# Patient Record
Sex: FEMALE | Race: WHITE | NOT HISPANIC OR LATINO | Employment: OTHER | URBAN - METROPOLITAN AREA
[De-identification: names, ages, dates, MRNs, and addresses within clinical notes are randomized per-mention and may not be internally consistent; named-entity substitution may affect disease eponyms.]

---

## 2017-02-20 ENCOUNTER — ALLSCRIPTS OFFICE VISIT (OUTPATIENT)
Dept: OTHER | Facility: OTHER | Age: 72
End: 2017-02-20

## 2017-03-31 ENCOUNTER — ALLSCRIPTS OFFICE VISIT (OUTPATIENT)
Dept: OTHER | Facility: OTHER | Age: 72
End: 2017-03-31

## 2017-03-31 ENCOUNTER — TRANSCRIBE ORDERS (OUTPATIENT)
Dept: ADMINISTRATIVE | Facility: HOSPITAL | Age: 72
End: 2017-03-31

## 2017-03-31 DIAGNOSIS — R59.0 BILATERAL HILAR ADENOPATHY SYNDROME: Primary | ICD-10-CM

## 2017-04-27 ENCOUNTER — ALLSCRIPTS OFFICE VISIT (OUTPATIENT)
Dept: OTHER | Facility: OTHER | Age: 72
End: 2017-04-27

## 2017-05-16 LAB
A/G RATIO (HISTORICAL): 1.3 (CALC) (ref 1–2.5)
ALBUMIN SERPL BCP-MCNC: 3.9 G/DL (ref 3.6–5.1)
ALP SERPL-CCNC: 85 U/L (ref 33–130)
ALT SERPL W P-5'-P-CCNC: 22 U/L (ref 6–29)
AST SERPL W P-5'-P-CCNC: 22 U/L (ref 10–35)
BILIRUB SERPL-MCNC: 0.5 MG/DL (ref 0.2–1.2)
BUN SERPL-MCNC: 17 MG/DL (ref 7–25)
BUN/CREA RATIO (HISTORICAL): NORMAL (CALC) (ref 6–22)
CALCIUM SERPL-MCNC: 9.2 MG/DL (ref 8.6–10.4)
CHLORIDE SERPL-SCNC: 101 MMOL/L (ref 98–110)
CHOLEST SERPL-MCNC: 177 MG/DL (ref 125–200)
CHOLEST/HDLC SERPL: 4.5 (CALC)
CO2 SERPL-SCNC: 29 MMOL/L (ref 20–31)
CREAT SERPL-MCNC: 0.87 MG/DL (ref 0.6–0.93)
EGFR AFRICAN AMERICAN (HISTORICAL): 78 ML/MIN/1.73M2
EGFR-AMERICAN CALC (HISTORICAL): 67 ML/MIN/1.73M2
GAMMA GLOBULIN (HISTORICAL): 2.9 G/DL (CALC) (ref 1.9–3.7)
GLUCOSE (HISTORICAL): 93 MG/DL (ref 65–99)
HDLC SERPL-MCNC: 39 MG/DL
LDL CHOLESTEROL (HISTORICAL): 109 MG/DL (CALC)
NON-HDL-CHOL (CHOL-HDL) (HISTORICAL): 138 MG/DL (CALC)
POTASSIUM SERPL-SCNC: 3.6 MMOL/L (ref 3.5–5.3)
SODIUM SERPL-SCNC: 140 MMOL/L (ref 135–146)
TOTAL PROTEIN (HISTORICAL): 6.8 G/DL (ref 6.1–8.1)
TRIGL SERPL-MCNC: 143 MG/DL

## 2017-06-01 DIAGNOSIS — E78.5 HYPERLIPIDEMIA: ICD-10-CM

## 2017-06-01 DIAGNOSIS — I10 ESSENTIAL (PRIMARY) HYPERTENSION: ICD-10-CM

## 2017-06-14 ENCOUNTER — ALLSCRIPTS OFFICE VISIT (OUTPATIENT)
Dept: OTHER | Facility: OTHER | Age: 72
End: 2017-06-14

## 2017-10-24 ENCOUNTER — LAB CONVERSION - ENCOUNTER (OUTPATIENT)
Dept: OTHER | Facility: OTHER | Age: 72
End: 2017-10-24

## 2017-10-24 LAB
A/G RATIO (HISTORICAL): 1.6 (CALC) (ref 1–2.5)
ALBUMIN SERPL BCP-MCNC: 4 G/DL (ref 3.6–5.1)
ALP SERPL-CCNC: 87 U/L (ref 33–130)
ALT SERPL W P-5'-P-CCNC: 27 U/L (ref 6–29)
AST SERPL W P-5'-P-CCNC: 31 U/L (ref 10–35)
BILIRUB SERPL-MCNC: 0.4 MG/DL (ref 0.2–1.2)
BUN SERPL-MCNC: 13 MG/DL (ref 7–25)
BUN/CREA RATIO (HISTORICAL): ABNORMAL (CALC) (ref 6–22)
CALCIUM SERPL-MCNC: 9.2 MG/DL (ref 8.6–10.4)
CHLORIDE SERPL-SCNC: 102 MMOL/L (ref 98–110)
CHOLEST SERPL-MCNC: 169 MG/DL
CHOLEST/HDLC SERPL: 4 (CALC)
CO2 SERPL-SCNC: 32 MMOL/L (ref 20–31)
CREAT SERPL-MCNC: 0.89 MG/DL (ref 0.6–0.93)
EGFR AFRICAN AMERICAN (HISTORICAL): 76 ML/MIN/1.73M2
EGFR-AMERICAN CALC (HISTORICAL): 65 ML/MIN/1.73M2
GAMMA GLOBULIN (HISTORICAL): 2.5 G/DL (CALC) (ref 1.9–3.7)
GLUCOSE (HISTORICAL): 87 MG/DL (ref 65–99)
HDLC SERPL-MCNC: 42 MG/DL
LDL CHOLESTEROL (HISTORICAL): 108 MG/DL (CALC)
NON-HDL-CHOL (CHOL-HDL) (HISTORICAL): 127 MG/DL (CALC)
POTASSIUM SERPL-SCNC: 3.9 MMOL/L (ref 3.5–5.3)
SODIUM SERPL-SCNC: 140 MMOL/L (ref 135–146)
TOTAL PROTEIN (HISTORICAL): 6.5 G/DL (ref 6.1–8.1)
TRIGL SERPL-MCNC: 98 MG/DL

## 2017-11-01 DIAGNOSIS — Z00.00 ENCOUNTER FOR GENERAL ADULT MEDICAL EXAMINATION WITHOUT ABNORMAL FINDINGS: ICD-10-CM

## 2017-11-01 DIAGNOSIS — I10 ESSENTIAL (PRIMARY) HYPERTENSION: ICD-10-CM

## 2017-11-01 DIAGNOSIS — E78.5 HYPERLIPIDEMIA: ICD-10-CM

## 2017-11-01 DIAGNOSIS — R01.1 CARDIAC MURMUR: ICD-10-CM

## 2017-11-15 ENCOUNTER — ALLSCRIPTS OFFICE VISIT (OUTPATIENT)
Dept: OTHER | Facility: OTHER | Age: 72
End: 2017-11-15

## 2017-11-17 NOTE — PROGRESS NOTES
Assessment    1  Benign essential hypertension (401 1) (I10)   2  Hyperlipidemia (272 4) (E78 5)   3  Wheezing (786 07) (R06 2)    Plan  Benign essential hypertension    · Triamterene-HCTZ 37 5-25 MG Oral Tablet; Take 1 tablet daily  Flu vaccine need    · Fluzone High-Dose 0 5 ML Intramuscular Suspension Prefilled Syringe  Hyperlipidemia    · Lovastatin 20 MG Oral Tablet; TAKE 1 TABLET DAILY   · Begin or continue regular aerobic exercise  Gradually work up to at least {count1}sessions of {dur1} of exercise a week ; Status:Complete;   Done: 31DRM8548   · Eat a low fat and low cholesterol diet ; Status:Complete;   Done: 02BPQ7396   · Follow-up visit in 5 days Evaluation and Treatment  Follow-up  Status: Hold For -Scheduling  Requested for: 79IHT5762   · (1) COMPREHENSIVE METABOLIC PANEL; Status:Active; Requested for:02Apr2018;    · (1) LIPID PANEL, FASTING; Status:Active; Requested for:02Apr2018; Wheezing    · ProAir  (90 Base) MCG/ACT Inhalation Aerosol Solution; INHALE TWOPUFFS BY MOUTH EVERY FOUR HOURS AS NEEDED    Discussion/Summary    Hypertension is at goal patient to continue triamterene  Lipids at goal patient to continue low-fat diet and lovastatin  Patient had 1 expiratory wheeze ProAir inhaler refilled  Flu vaccine given today  Labs ordered for next visit  The treatment plan was reviewed with the patient/guardian  The patient/guardian understands and agrees with the treatment plan      Chief Complaint  blood work follow up   Patient is here today for follow up of chronic conditions described in HPI  History of Present Illness  Patient presents for follow up chronic conditions patient has hypertension controlled with triamterene-HCTZ  Patient is on lovastatin for hyperlipidemia  Patient takes calcium over-the-counter for her osteopenia  Flu vaccine given today  Labs reviewed with patient CMP is normal and lipid profile is at goal  Patient has been using ProAir HFA little bit more this fall  Patient has some chest tightness and shortness of breath going up and down the stairs since the weather change  Patient has no history of asthma  Patient denies chest pain  Review of Systems   Constitutional: no recent weight gain-- and-- no recent weight loss  Eyes: no eyesight problems  Cardiovascular: no chest pain-- and-- no lower extremity edema  Respiratory: wheezing-- and-- shortness of breath during exertion, but-- no cough  Gastrointestinal: no abdominal pain,-- no constipation-- and-- no diarrhea  Genitourinary: no dysuria  Musculoskeletal: no arthralgias-- and-- no myalgias  Integumentary: no rashes  Neurological: no headache-- and-- no dizziness  Active Problems  1  Benign essential hypertension (401 1) (I10)   2  Cardiac murmur (785 2) (R01 1)   3  Glaucoma Screening   4  Hyperlipidemia (272 4) (E78 5)   5  Medicare annual wellness visit, subsequent (V70 0) (Z00 00)   6  Osteopenia (733 90) (M85 80)    Past Medical History  1  History of Benign Polyps Of The Large Intestine (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 72)   2  History of Depression screen (V79 0) (Z13 89)   3  Denied: History of depression   4  Denied: History of substance abuse   5  History of Medicare annual wellness visit, subsequent (V70 0) (Z00 00)   6  History of Menopause (V49 81)   7  History of Screening for genitourinary condition (V81 6) (Z13 89)   8  History of Screening for neurological condition (V80 09) (Z13 89)    The active problems and past medical history were reviewed and updated today  Surgical History  1  History of Arthroscopy Knee Left   2  History of Excision Of Lesion Lips Benign   3  History of Incisional Breast Biopsy   4  Denied: History of Knee Replacement   5  History of Varicose Vein Ligation    Family History  Mother    1  Denied: Family history of depression   2  Family history of hypertension (V17 49) (Z82 49)   3  Family history of pancreatic cancer (V16 0) (Z80 0)   4   Denied: Family history of substance abuse  Father    5  Denied: Family history of depression   6  Denied: Family history of substance abuse   7  Family history of Motor vehicle accident with significant injury  Sister    6  Family history of malignant neoplasm of breast (V16 3) (Z80 3)  Brother    5  Family history of lung cancer (V16 1) (Z80 1)    Social History     · Being A Social Drinker   · Former smoker (E78 62) (X19 140)  The social history was reviewed and updated today  Current Meds   1  Calcium TABS; Therapy: (Recorded:10Aug2015) to Recorded   2  Co Q-10 CAPS; Therapy: (Recorded:10Aug2015) to Recorded   3  Fish Oil CAPS; Therapy: (Recorded:10Aug2015) to Recorded   4  Lovastatin 20 MG Oral Tablet; TAKE 1 TABLET DAILY; Therapy: 47FTK6711 to (Renew:11Dec2017)  Requested for: 13ENU1758; Last Rx:14Jun2017 Ordered   5  Multi For Her TABS; Therapy: (Recorded:10Aug2015) to Recorded   6  ProAir  (90 Base) MCG/ACT Inhalation Aerosol Solution; INHALE TWO PUFFS BY MOUTH EVERY FOUR HOURS AS NEEDED; Therapy: 96XBV6817 to (Last Rx:30Mar2016)  Requested for: 92YCC4867 Ordered   7  Triamcinolone Acetonide 0 1 % Mouth/Throat Paste; Therapy: 07Shj4576 to Recorded   8  Triamterene-HCTZ 37 5-25 MG Oral Tablet; Take 1 tablet daily; Therapy: 06SKV2274 to (Renew:11Dec2017)  Requested for: 79SXT8916; Last Rx:14Jun2017 Ordered    The medication list was reviewed and updated today  Allergies  1  No Known Drug Allergies    Vitals  Vital Signs    Recorded: 56THD4621 06:33PM   Temperature 97 6 F, Tympanic   Heart Rate 69, L Brachial Artery   Pulse Quality Normal, L Brachial Artery   Respiration Quality Normal   Respiration 16   Systolic 478, LUE, Sitting   Diastolic 76, LUE, Sitting   Height 5 ft 3 in   Weight 154 lb    BMI Calculated 27 28   BSA Calculated 1 73   O2 Saturation 97       Physical Exam   Constitutional  General appearance: No acute distress, well appearing and well nourished     Head and Face  Head and face: Normal    Eyes  Conjunctiva and lids: No swelling, erythema or discharge  Pupils and irises: Equal, round, reactive to light  Ears, Nose, Mouth, and Throat  External inspection of ears and nose: Normal    Oropharynx: Normal with no erythema, edema, exudate or lesions  Neck  Neck: Supple, symmetric, trachea midline, no masses  Thyroid: Normal, no thyromegaly  Pulmonary  Auscultation of lungs: Abnormal  -- pt had one expiratoery wheeze  Cardiovascular  Auscultation of heart: Normal rate and rhythm, normal S1 and S2, no murmurs  Carotid pulses: 2+ bilaterally  Examination of extremities for edema and/or varicosities: Normal    Abdomen  Abdomen: Non-tender, no masses  Liver and spleen: No hepatomegaly or splenomegaly  Lymphatic  Palpation of lymph nodes in neck: No lymphadenopathy  Psychiatric  Judgment and insight: Normal    Mood and affect: Normal        Health Management  History of Encounter for screening mammogram for breast cancer   * MAMMO SCREENING BILATERAL W CAD; every 1 year; Last 27ZRZ4126; Next Due: 58BNS9459; Overdue  Digital Bilateral Screening Mammogram With CAD; every 1 year; Last 87VKP3931; Next Due:84Pbr2575; Overdue  Health Maintenance   Medicare Annual Wellness Visit; every 1 year; Last 97NUP5150; Next Due: 51MSA6863;  Overdue    Future Appointments    Date/Time Provider Specialty Site   04/16/2018 10:30 AM Vesta Kirk [facility]       Signatures   Electronically signed by : Loyda Eason [facility]; Nov 15 2017  7:12PM EST                       (Author)    Electronically signed by : OLEG Moore ; Nov 16 2017  9:06AM EST                       (Author)

## 2018-01-10 NOTE — PROGRESS NOTES
Assessment    1  Encounter for preventive health examination (V70 0) (Z00 00)    Discussion/Summary  health maintenance visit Currently, she eats a healthy diet and has an adequate exercise regimen  cervical cancer screening is not indicated Breast cancer screening: mammogram is current  Colorectal cancer screening: colorectal cancer screening is current  Osteoporosis screening: bone mineral density testing is current  The immunizations are up to date  Advice and education were given regarding aerobic exercise, calcium supplements and vitamin D supplements  81 yo healthy white female  History of Present Illness  HM, Adult Female: The patient is being seen for a health maintenance evaluation  General Health: The patient's health since the last visit is described as good  She has regular dental visits  She denies vision problems  She denies hearing loss  Immunizations status: up to date  Lifestyle:  She exercises regularly  She does not use tobacco  She denies alcohol use  Reproductive health: the patient is postmenopausal    Screening: Cervical cancer screening includes uncertain timing of her last pap smear  Breast cancer screening includes a mammogram performed last year  Colorectal cancer screening includes a colonoscopy performed within the past ten years  Metabolic screening includes lipid profile performed 21016, glucose screening performed 2016, thyroid function test performed last year and DEXA performed within the past five years  Review of Systems    Constitutional: no fever and no chills  Eyes: no eye pain  ENT: no earache  Cardiovascular: no chest pain and no lower extremity edema  Respiratory: shortness of breath and sob with exertion, but no wheezing and no cough  Gastrointestinal: no abdominal pain, no nausea, no vomiting, no diarrhea, no constipation, no bright red blood per rectum and no melena  Genitourinary: no dysuria     Musculoskeletal: no diffuse joint pain, no joint swelling and no joint stiffness  Integumentary and Breasts: no rashes  Neurological: no headache, no confusion, no dizziness, no fainting, no leg numbness, no leg weakness and no difficulty walking  Psychiatric: no insomnia and no depression  Hematologic and Lymphatic: no tendency for easy bleeding and no tendency for easy bruising  Active Problems    1  Benign essential hypertension (401 1) (I10)   2  Cardiac murmur (785 2) (R01 1)   3  Depression screen (V79 0) (Z13 89)   4  Glaucoma Screening   5  Hyperlipidemia (272 4) (E78 5)   6  Medicare annual wellness visit, subsequent (V70 0) (Z00 00)   7  Osteoporosis (733 00) (M81 0)   8  Screening for genitourinary condition (V81 6) (Z13 89)   9  Screening for neurological condition (V80 09) (Z13 89)    Past Medical History    · History of Benign Polyps Of The Large Intestine (V12 72)   · History of Menopause (V49 81)    Surgical History    · History of Arthroscopy Knee Left   · History of Excision Of Lesion Lips Benign   · History of Incisional Breast Biopsy   · Denied: History of Knee Replacement   · History of Varicose Vein Ligation    Family History  Mother    · Family history of hypertension (V17 49) (Z82 49)   · Family history of pancreatic cancer (V16 0) (Z80 0)  Father    · Family history of Motor vehicle accident with significant injury  Sister    · Family history of malignant neoplasm of breast (V16 3) (Z80 3)  Brother    · Family history of lung cancer (V16 1) (Z80 1)    Social History    · Being A Social Drinker   · Former smoker (N95 49) (C65 817)    Current Meds   1  Calcium TABS; Therapy: (Recorded:10Aug2015) to Recorded   2  Co Q-10 CAPS; Therapy: (Recorded:10Aug2015) to Recorded   3  Fish Oil CAPS; Therapy: (Recorded:10Aug2015) to Recorded   4  Lovastatin 20 MG Oral Tablet; TAKE 1 TABLET DAILY; Therapy: 68DFL2453 to (Evaluate:14Nov2016)  Requested for: 19VCR6374; Last   Rx:09Bmm8671 Ordered   5   Multi For Her TABS; Therapy: (Recorded:10Aug2015) to Recorded   6  ProAir  (90 Base) MCG/ACT Inhalation Aerosol Solution; INHALE TWO PUFFS   BY MOUTH EVERY FOUR HOURS AS NEEDED; Therapy: 16OOA9375 to (Last Rx:30Mar2016)  Requested for: 57HUJ7195 Ordered   7  Triamcinolone Acetonide 0 1 % Mouth/Throat Paste; Therapy: 03Ojb1795 to Recorded   8  Triamterene-HCTZ 37 5-25 MG Oral Tablet; Take 1 tablet daily; Therapy: 45UHM7888 to (Evaluate:14Nov2016)  Requested for: 35RLW6425; Last   Rx:48Dsl5824 Ordered    Allergies    1  No Known Drug Allergies    Vitals   Recorded: 29DQN7639 03:32PM   Temperature 97 4 F, Tympanic   Heart Rate 72, L Brachial Artery   Pulse Quality Normal, L Brachial Artery   Respiration 16   Respiration Quality Normal   Systolic 452, LUE, Sitting   Diastolic 82, LUE, Sitting   Height 5 ft 3 in   Weight 149 lb 12 8 oz   BMI Calculated 26 54   BSA Calculated 1 71     Physical Exam    Constitutional   General appearance: No acute distress, well appearing and well nourished  Head and Face   Head and face: Normal     Eyes   Conjunctiva and lids: No swelling, erythema or discharge  Pupils and irises: Equal, round, reactive to light  Ears, Nose, Mouth, and Throat   External inspection of ears and nose: Normal     Otoscopic examination: Tympanic membranes translucent with normal light reflex  Canals patent without erythema  Lips, teeth, and gums: Normal, good dentition  Oropharynx: Normal with no erythema, edema, exudate or lesions  Neck   Neck: Supple, symmetric, trachea midline, no masses  Thyroid: Normal, no thyromegaly  Pulmonary   Respiratory effort: No increased work of breathing or signs of respiratory distress  Auscultation of lungs: Clear to auscultation  Cardiovascular   Auscultation of heart: Normal rate and rhythm, normal S1 and S2, no murmurs  Carotid pulses: 2+ bilaterally      Examination of extremities for edema and/or varicosities: Normal     Abdomen   Abdomen: Non-tender, no masses  Liver and spleen: No hepatomegaly or splenomegaly  Lymphatic   Palpation of lymph nodes in neck: No lymphadenopathy  Musculoskeletal   Gait and station: Normal     Digits and nails: Normal without clubbing or cyanosis  Examination of the extremities revealed no fingernail clubbing and well perfused fingers  Muscle strength/tone: Normal     Skin   Skin and subcutaneous tissue: Normal without rashes or lesions  Neurologic   Cranial nerves: Cranial nerves II-XII intact  Cranial Nerve II: visual acuity and visual fields were intact  Cranial Nerves III, IV, and VI: the extraocular motions were intact  Cranial Nerve V: sensation to the face and masseter strength were intact  Cranial Nerve VII: facial strength was intact bilaterally  Cranial Nerves IX and X: there was normal movement of the soft palate and normal gag  Cranial Nerve XI: shoulder shrug was intact bilaterally  Cranial Nerve XII: there was no tongue deviation with protrusion  Reflexes: 2+ and symmetric  Deep tendon reflexes: 2+ right brachioradialis, 2+ left brachioradialis, 2+ right patella and 2+ left patella  Psychiatric   Judgment and insight: Normal     Orientation to person, place, and time: Normal     Mood and affect: Normal        Results/Data  [employer] 42SXZ6467 03:33PM User, [facility]     Test Name Result Flag Reference   SBIRT Screen - Tobacco Screening Result Negative         Health Management  History of Encounter for screening mammogram for breast cancer   Digital Bilateral Screening Mammogram With CAD; every 1 year; Last 02XHW7695; Next  Due: 15Ohg4653; Near Due  Health Maintenance   Medicare Annual Wellness Visit; every 1 year; Next Due: 95VQG9983;  Overdue    Future Appointments    Date/Time Provider Specialty Site   08/18/2016 03:30 PM Emil Grier [facility]     Signatures   Electronically signed by : Emil Cleary; Jun 7 2016  4:02PM EST                       (Author)    Electronically signed by : Anthony Barrett DO; Jun 8 2016  7:57AM EST                       (Author)

## 2018-01-11 NOTE — RESULT NOTES
Verified Results  * DXA BONE DENSITY SPINE HIP AND PELVIS 25Oct2016 01:57PM [place]     Test Name Result Flag Reference   DXA BONE DENSITY SPINE HIP AND PELVIS 10/25/2016        Summary / No summary entered :      No summary entered   Documents attached :      Lorin Abrams Douse: [id number] - Image Encounter -      [facility]) (Result Document)

## 2018-01-12 VITALS
BODY MASS INDEX: 26.65 KG/M2 | HEART RATE: 71 BPM | SYSTOLIC BLOOD PRESSURE: 126 MMHG | WEIGHT: 150.4 LBS | OXYGEN SATURATION: 97 % | DIASTOLIC BLOOD PRESSURE: 82 MMHG | HEIGHT: 63 IN | RESPIRATION RATE: 16 BRPM | TEMPERATURE: 97.7 F

## 2018-01-12 VITALS
HEIGHT: 63 IN | SYSTOLIC BLOOD PRESSURE: 140 MMHG | RESPIRATION RATE: 16 BRPM | WEIGHT: 154 LBS | DIASTOLIC BLOOD PRESSURE: 82 MMHG | TEMPERATURE: 98.8 F | OXYGEN SATURATION: 97 % | HEART RATE: 77 BPM | BODY MASS INDEX: 27.29 KG/M2

## 2018-01-13 VITALS
OXYGEN SATURATION: 97 % | DIASTOLIC BLOOD PRESSURE: 76 MMHG | TEMPERATURE: 97.6 F | HEART RATE: 69 BPM | RESPIRATION RATE: 16 BRPM | HEIGHT: 63 IN | BODY MASS INDEX: 27.29 KG/M2 | WEIGHT: 154 LBS | SYSTOLIC BLOOD PRESSURE: 128 MMHG

## 2018-01-13 VITALS
TEMPERATURE: 98.2 F | RESPIRATION RATE: 16 BRPM | HEIGHT: 63 IN | WEIGHT: 151.13 LBS | SYSTOLIC BLOOD PRESSURE: 160 MMHG | BODY MASS INDEX: 26.78 KG/M2 | DIASTOLIC BLOOD PRESSURE: 80 MMHG | OXYGEN SATURATION: 97 % | HEART RATE: 78 BPM

## 2018-01-14 VITALS
HEIGHT: 63 IN | TEMPERATURE: 97.8 F | DIASTOLIC BLOOD PRESSURE: 86 MMHG | OXYGEN SATURATION: 97 % | SYSTOLIC BLOOD PRESSURE: 124 MMHG | WEIGHT: 151.8 LBS | BODY MASS INDEX: 26.9 KG/M2 | RESPIRATION RATE: 16 BRPM | HEART RATE: 64 BPM

## 2018-01-16 NOTE — RESULT NOTES
Verified Results  ECHO COMPLETE WITH CONTRAST IF INDICATED, TTE / TRANSTHORACIC 66UQB6233 07:44AM Claribel Staggers     Test Name Result Flag Reference   ECHO COMPLETE WITH CONTRAST IF INDICATED (Report)     [address]   [phone]     Transthoracic Echocardiogram   2D, M-mode, Doppler, and Color Doppler     Study date: 03-Mar-2016     Patient: Patricia Workman   MR number: [id number]   Account number: [de-identified]   : 1945   Age: 80 years   Gender: Female   Status: Outpatient   Location: [place] [address]    Height: 63 in   Weight: 155 lb   BP: 130/ 74 mmHg     Indications: SOB  Murmur  Diagnoses: R01 1 - Cardiac murmur, unspecified     Sonographer: Geller RCS   Primary Physician: Neelam Solomon   Referring Physician: Gemini Mccormick: Claudine Benson [facility]   Interpreting Physician: Marcello Guzman MD     SUMMARY     LEFT VENTRICLE:   Systolic function was normal  Ejection fraction was estimated to be 65 %  There were no regional wall motion abnormalities  HISTORY: PRIOR HISTORY: Former smoker  Risk factors: medication-treated   hypercholesterolemia  PROCEDURE: The study was performed in the [place]  This was a   routine study  The transthoracic approach was used  The study included complete   2D imaging, M-mode, complete spectral Doppler, and color Doppler  The heart   rate was 72 bpm, at the start of the study  Images were obtained from the   parasternal, apical, subcostal, and suprasternal notch acoustic windows  Image   quality was adequate  LEFT VENTRICLE: Size was normal  Systolic function was normal  Ejection   fraction was estimated to be 65 %  There were no regional wall motion   abnormalities  Wall thickness was normal  DOPPLER: The ratio of early   ventricular filling to atrial contraction velocities was within the normal   range       RIGHT VENTRICLE: The size was normal  Systolic function was normal      LEFT ATRIUM: Size was normal      RIGHT ATRIUM: Size was normal      MITRAL VALVE: Valve structure was normal  There was normal leaflet separation  DOPPLER: The transmitral velocity was within the normal range  There was no   evidence for stenosis  There was mild regurgitation  AORTIC VALVE: The valve was trileaflet  Leaflets exhibited mildly increased   thickness and normal cuspal separation  DOPPLER: There was no evidence for   stenosis  There was no regurgitation  TRICUSPID VALVE: The valve structure was normal  There was normal leaflet   separation  DOPPLER: The transtricuspid velocity was within the normal range  There was no evidence for stenosis  There was trace regurgitation  Estimated   peak PA pressure was 30 mmHg  PULMONIC VALVE: Leaflets exhibited normal thickness, no calcification, and   normal cuspal separation  DOPPLER: The transpulmonic velocity was within the   normal range  There was no regurgitation  PERICARDIUM: There was no pericardial effusion  A pericardial fat pad was   present  The pericardium was normal in appearance  AORTA: The root exhibited normal size  SYSTEMIC VEINS: IVC: The inferior vena cava was normal in size and course     Respirophasic changes were normal      SYSTEM MEASUREMENT TABLES     2D   %FS: 48 7 %   AV Diam: 3 13 cm   EDV(Teich): 70 71 ml   EF(Cube): 86 5 %   EF(Teich): 80 58 %   ESV(Cube): 8 75 ml   ESV(Teich): 13 73 ml   IVSd: 0 93 cm   LA Area: 10 11 cm2   LA Diam: 3 3 cm   LVEDV MOD A4C: 62 52 ml   LVEF MOD A4C: 64 61 %   LVESV MOD A4C: 22 13 ml   LVIDd: 4 02 cm   LVIDs: 2 06 cm   LVLd A4C: 6 59 cm   LVLs A4C: 5 57 cm   LVPWd: 0 93 cm   RA Area: 9 79 cm2   RV Diam: 2 58 cm   SV MOD A4C: 40 4 ml   SV(Cube): 56 07 ml   SV(Teich): 56 98 ml     CW   TR MaxP 95 mmHg   TR Vmax: 2 69 m/s     MM   TAPSE: 2 13 cm     PW   E': 0 06 m/s   MV A Ronan: 0 67 m/s   MV Dec C[place]: 3 68 m/s2   MV DecT: 199 63 ms   MV E Ronan: 0 73 m/s   MV E/A Ratio: 1 1   MV PHT: 70 01 ms   MVA By PHT: 3 14 cm2     [employer] Accredited Echocardiography Laboratory     Prepared and electronically signed by     Jorge Gregg MD   Signed 03-Mar-2016 13:34:40

## 2018-02-28 ENCOUNTER — OFFICE VISIT (OUTPATIENT)
Dept: FAMILY MEDICINE CLINIC | Facility: CLINIC | Age: 73
End: 2018-02-28
Payer: COMMERCIAL

## 2018-02-28 VITALS
WEIGHT: 156.6 LBS | DIASTOLIC BLOOD PRESSURE: 84 MMHG | HEIGHT: 63 IN | TEMPERATURE: 97.5 F | HEART RATE: 72 BPM | OXYGEN SATURATION: 96 % | SYSTOLIC BLOOD PRESSURE: 126 MMHG | BODY MASS INDEX: 27.75 KG/M2

## 2018-02-28 DIAGNOSIS — R06.2 WHEEZING: ICD-10-CM

## 2018-02-28 DIAGNOSIS — J20.9 BRONCHITIS, ACUTE, WITH BRONCHOSPASM: Primary | ICD-10-CM

## 2018-02-28 PROCEDURE — 99214 OFFICE O/P EST MOD 30 MIN: CPT | Performed by: PHYSICIAN ASSISTANT

## 2018-02-28 RX ORDER — ALBUTEROL SULFATE 90 UG/1
2 AEROSOL, METERED RESPIRATORY (INHALATION) EVERY 4 HOURS PRN
COMMUNITY
Start: 2016-03-30 | End: 2018-10-10 | Stop reason: SDUPTHER

## 2018-02-28 RX ORDER — METHYLPREDNISOLONE 4 MG/1
TABLET ORAL
Qty: 21 TABLET | Refills: 0 | Status: SHIPPED | OUTPATIENT
Start: 2018-02-28 | End: 2018-04-16 | Stop reason: ALTCHOICE

## 2018-02-28 RX ORDER — LOVASTATIN 20 MG/1
1 TABLET ORAL DAILY
COMMUNITY
Start: 2012-03-09 | End: 2018-05-25 | Stop reason: SDUPTHER

## 2018-02-28 RX ORDER — DIMENHYDRINATE 50 MG
TABLET ORAL
COMMUNITY

## 2018-02-28 RX ORDER — UBIDECARENONE 30 MG
CAPSULE ORAL
COMMUNITY

## 2018-02-28 RX ORDER — TRIAMTERENE AND HYDROCHLOROTHIAZIDE 37.5; 25 MG/1; MG/1
1 TABLET ORAL DAILY
COMMUNITY
Start: 2012-03-13 | End: 2018-05-25 | Stop reason: SDUPTHER

## 2018-02-28 RX ORDER — TRIAMCINOLONE ACETONIDE 0.1 %
PASTE (GRAM) DENTAL
COMMUNITY
Start: 2014-08-25

## 2018-02-28 RX ORDER — AZITHROMYCIN 250 MG/1
TABLET, FILM COATED ORAL
Qty: 6 TABLET | Refills: 0 | Status: SHIPPED | OUTPATIENT
Start: 2018-02-28 | End: 2018-03-04

## 2018-02-28 NOTE — PROGRESS NOTES
Assessment/Plan:         There are no diagnoses linked to this encounter  Subjective:      Patient ID: Clint Sánchez is a 68 y o  female  Patient presents with a dry spasmodic cough for over 2 weeks  Patient states she had a cold last week  Then she had pinkeye over the weekend  Patient does watch her grandchildren  Patient found use her inhaler up ProAir several times with not much relief  Patient states when her she coughs her chest does hurt she has no earache throat hurts from coughing no fever body aches or chills  The following portions of the patient's history were reviewed and updated as appropriate:   She   Patient Active Problem List    Diagnosis Date Noted    Osteopenia 11/18/2016    Cardiac murmur 02/17/2016    Benign essential hypertension 06/11/2012    Hyperlipidemia 06/11/2012     Current Outpatient Prescriptions   Medication Sig Dispense Refill    albuterol (PROAIR HFA) 90 mcg/act inhaler Inhale 2 puffs every 4 (four) hours as needed      Calcium Carb-Cholecalciferol (CALCIUM 1000 + D PO) Take by mouth      coenzyme Q-10 100 MG capsule Take by mouth      lovastatin (MEVACOR) 20 mg tablet Take 1 tablet by mouth daily      Multiple Vitamins-Minerals (MULTI FOR HER) TABS Take by mouth      Omega-3 Fatty Acids (FISH OIL) 645 MG CAPS Take by mouth      triamcinolone (KENALOG) 0 1 % oral topical paste Apply to the mouth or throat      triamterene-hydrochlorothiazide (MAXZIDE-25) 37 5-25 mg per tablet Take 1 tablet by mouth daily       No current facility-administered medications for this visit  No current outpatient prescriptions on file prior to visit  No current facility-administered medications on file prior to visit  She has No Known Allergies       Review of Systems   Constitutional: Negative for chills, fatigue and fever  HENT: Negative for ear pain, sinus pain and sinus pressure  Respiratory: Positive for cough and shortness of breath   Negative for wheezing  Cardiovascular: Negative for chest pain  Gastrointestinal: Negative for abdominal pain, diarrhea and vomiting  Genitourinary: Negative for dysuria  Musculoskeletal: Negative for arthralgias and myalgias  Skin: Negative for rash  Objective:      /84 (BP Location: Left arm, Patient Position: Sitting, Cuff Size: Standard)   Pulse 72   Temp 97 5 °F (36 4 °C)   Ht 5' 3" (1 6 m)   Wt 71 kg (156 lb 9 6 oz)   SpO2 96%   BMI 27 74 kg/m²          Physical Exam   Constitutional: She appears well-developed and well-nourished  HENT:   Left Ear: External ear normal    Mouth/Throat: Oropharynx is clear and moist    Eyes: Conjunctivae are normal  Pupils are equal, round, and reactive to light  Cardiovascular: Normal rate, regular rhythm and normal heart sounds  No murmur heard  Pulmonary/Chest: Effort normal  No respiratory distress  She has wheezes (pt  has diffuse  expiratory  wheezes  )  Musculoskeletal: She exhibits no edema  Lymphadenopathy:     She has no cervical adenopathy

## 2018-03-26 LAB
ALBUMIN SERPL-MCNC: 4.2 G/DL (ref 3.6–5.1)
ALBUMIN/GLOB SERPL: 1.6 (CALC) (ref 1–2.5)
ALP SERPL-CCNC: 78 U/L (ref 33–130)
ALT SERPL-CCNC: 17 U/L (ref 6–29)
AST SERPL-CCNC: 19 U/L (ref 10–35)
BILIRUB SERPL-MCNC: 0.4 MG/DL (ref 0.2–1.2)
BUN SERPL-MCNC: 22 MG/DL (ref 7–25)
BUN/CREAT SERPL: 22 (CALC) (ref 6–22)
CALCIUM SERPL-MCNC: 9.8 MG/DL (ref 8.6–10.4)
CHLORIDE SERPL-SCNC: 103 MMOL/L (ref 98–110)
CHOLEST SERPL-MCNC: 189 MG/DL
CHOLEST/HDLC SERPL: 4.2 (CALC)
CO2 SERPL-SCNC: 32 MMOL/L (ref 20–31)
CREAT SERPL-MCNC: 1.01 MG/DL (ref 0.6–0.93)
GLOBULIN SER CALC-MCNC: 2.7 G/DL (CALC) (ref 1.9–3.7)
GLUCOSE SERPL-MCNC: 96 MG/DL (ref 65–99)
HDLC SERPL-MCNC: 45 MG/DL
LDLC SERPL CALC-MCNC: 120 MG/DL (CALC)
NONHDLC SERPL-MCNC: 144 MG/DL (CALC)
POTASSIUM SERPL-SCNC: 4 MMOL/L (ref 3.5–5.3)
PROT SERPL-MCNC: 6.9 G/DL (ref 6.1–8.1)
SL AMB EGFR AFRICAN AMERICAN: 64 ML/MIN/1.73M2
SL AMB EGFR NON AFRICAN AMERICAN: 56 ML/MIN/1.73M2
SODIUM SERPL-SCNC: 142 MMOL/L (ref 135–146)
TRIGL SERPL-MCNC: 128 MG/DL

## 2018-04-02 DIAGNOSIS — E78.5 HYPERLIPIDEMIA: ICD-10-CM

## 2018-04-16 ENCOUNTER — OFFICE VISIT (OUTPATIENT)
Dept: FAMILY MEDICINE CLINIC | Facility: CLINIC | Age: 73
End: 2018-04-16
Payer: COMMERCIAL

## 2018-04-16 VITALS
HEART RATE: 64 BPM | DIASTOLIC BLOOD PRESSURE: 82 MMHG | OXYGEN SATURATION: 96 % | HEIGHT: 63 IN | BODY MASS INDEX: 27.46 KG/M2 | WEIGHT: 155 LBS | TEMPERATURE: 97.3 F | SYSTOLIC BLOOD PRESSURE: 124 MMHG

## 2018-04-16 DIAGNOSIS — E78.5 HYPERLIPIDEMIA, UNSPECIFIED HYPERLIPIDEMIA TYPE: ICD-10-CM

## 2018-04-16 DIAGNOSIS — R79.89 CREATININE ELEVATION: ICD-10-CM

## 2018-04-16 DIAGNOSIS — I10 BENIGN ESSENTIAL HYPERTENSION: Primary | ICD-10-CM

## 2018-04-16 DIAGNOSIS — K14.0 GLOSSITIS: ICD-10-CM

## 2018-04-16 DIAGNOSIS — M85.80 OSTEOPENIA, UNSPECIFIED LOCATION: ICD-10-CM

## 2018-04-16 PROBLEM — IMO0002 CREATININE ELEVATION: Status: ACTIVE | Noted: 2018-04-16

## 2018-04-16 PROBLEM — K63.5 POLYP OF SIGMOID COLON: Status: ACTIVE | Noted: 2018-04-16

## 2018-04-16 PROCEDURE — 99214 OFFICE O/P EST MOD 30 MIN: CPT | Performed by: PHYSICIAN ASSISTANT

## 2018-04-16 PROCEDURE — 1101F PT FALLS ASSESS-DOCD LE1/YR: CPT | Performed by: PHYSICIAN ASSISTANT

## 2018-04-16 NOTE — PROGRESS NOTES
Assessment/Plan:         Diagnoses and all orders for this visit:    Benign essential hypertension  Comments:    Hypertension at goal continue current medication  Osteopenia, unspecified location  Comments:    Continue calcium and vitamin-D supplements  Regular weight-bearing exercise encouraged for bone health  Hyperlipidemia, unspecified hyperlipidemia type  Comments:    Lipids at goal with low-fat diet and statin therapy  Creatinine elevation  Comments:    Elevated creatinine normal BUN slightly decreased GFR BMP at next visit  No NSAIDs  Increase fluids    Glossitis  Comments:   tongue exam is normal oral and mucous mucosa normal   Continue mouth rinses  Follow up if glossitis persists  Subjective:      Patient ID: Gagandeep Guzman is a 68 y o  female  Patient presents for follow up chronic conditions  Patient has hypertension well controlled with Maxzide  Hyperlipidemia controlled with lovastatin 20 mg and low-fat diet  Patient has osteopenia she is on calcium and vitamin-D her last  DEXA scan was October of 2016  Patient had her mammogram last summer through Dr Bienvenido Romero will call with results  Patient also had her eye exam with Dr Speedy higuera in November  Labs reviewed with patient show normal lipid panel  Normal fasting blood sugar  Elevated creatinine and normal BUN GFR 56  Patient denies NSAID use  Patient only takes Tylenol as needed  Patient did take prednisone at the end of February  Will recheck renal functions before next visit  Patient has some left-sided tongue burning sensation intermittently for several days  Patient has been using an over-the-counter apthous ulcer mouthwash with some relief  No alteration in taste  The following portions of the patient's history were reviewed and updated as appropriate:   She  has no past medical history on file    She   Patient Active Problem List    Diagnosis Date Noted    Creatinine elevation 04/16/2018    Osteopenia 11/18/2016    Cardiac murmur 02/17/2016    Benign essential hypertension 06/11/2012    Hyperlipidemia 06/11/2012     Current Outpatient Prescriptions   Medication Sig Dispense Refill    albuterol (PROAIR HFA) 90 mcg/act inhaler Inhale 2 puffs every 4 (four) hours as needed      Calcium Carb-Cholecalciferol (CALCIUM 1000 + D PO) Take by mouth      coenzyme Q-10 100 MG capsule Take by mouth      lovastatin (MEVACOR) 20 mg tablet Take 1 tablet by mouth daily      Multiple Vitamins-Minerals (MULTI FOR HER) TABS Take by mouth      Omega-3 Fatty Acids (FISH OIL) 645 MG CAPS Take by mouth      triamcinolone (KENALOG) 0 1 % oral topical paste Apply to the mouth or throat      triamterene-hydrochlorothiazide (MAXZIDE-25) 37 5-25 mg per tablet Take 1 tablet by mouth daily       No current facility-administered medications for this visit  Current Outpatient Prescriptions on File Prior to Visit   Medication Sig    albuterol (PROAIR HFA) 90 mcg/act inhaler Inhale 2 puffs every 4 (four) hours as needed    Calcium Carb-Cholecalciferol (CALCIUM 1000 + D PO) Take by mouth    coenzyme Q-10 100 MG capsule Take by mouth    lovastatin (MEVACOR) 20 mg tablet Take 1 tablet by mouth daily    Multiple Vitamins-Minerals (MULTI FOR HER) TABS Take by mouth    Omega-3 Fatty Acids (FISH OIL) 645 MG CAPS Take by mouth    triamcinolone (KENALOG) 0 1 % oral topical paste Apply to the mouth or throat    triamterene-hydrochlorothiazide (MAXZIDE-25) 37 5-25 mg per tablet Take 1 tablet by mouth daily    [DISCONTINUED] Methylprednisolone 4 MG TBPK Use as directed on package     No current facility-administered medications on file prior to visit  She has No Known Allergies       Review of Systems      Objective:      /82 (BP Location: Left arm, Patient Position: Sitting, Cuff Size: Standard)   Pulse 64   Temp (!) 97 3 °F (36 3 °C)   Ht 5' 3" (1 6 m)   Wt 70 3 kg (155 lb)   SpO2 96%   BMI 27 46 kg/m² Physical Exam   Constitutional: She is oriented to person, place, and time  She appears well-developed and well-nourished  No distress  HENT:   Head: Normocephalic  Right Ear: Tympanic membrane, external ear and ear canal normal    Left Ear: Tympanic membrane and ear canal normal    Mouth/Throat: Oropharynx is clear and moist and mucous membranes are normal  No oral lesions  Normal dentition  No lacerations  No oropharyngeal exudate or posterior oropharyngeal edema  Tongue  Clear  Eyes: Pupils are equal, round, and reactive to light  Neck: Neck supple  Carotid bruit is not present  No thyromegaly present  Cardiovascular: Normal rate and regular rhythm  Murmur heard  Systolic murmur is present with a grade of 1/6   Pulmonary/Chest: Effort normal and breath sounds normal    Abdominal: Soft  Bowel sounds are normal  She exhibits no mass  There is no tenderness  Musculoskeletal: She exhibits no edema  Lymphadenopathy:     She has no cervical adenopathy  Neurological: She is alert and oriented to person, place, and time  Skin: Skin is warm and dry  Psychiatric: She has a normal mood and affect   Her behavior is normal  Thought content normal

## 2018-05-25 DIAGNOSIS — I10 ESSENTIAL HYPERTENSION: Primary | ICD-10-CM

## 2018-05-25 DIAGNOSIS — E78.5 HYPERLIPIDEMIA, UNSPECIFIED HYPERLIPIDEMIA TYPE: ICD-10-CM

## 2018-05-25 RX ORDER — LOVASTATIN 20 MG/1
20 TABLET ORAL DAILY
Qty: 90 TABLET | Refills: 0 | Status: SHIPPED | OUTPATIENT
Start: 2018-05-25 | End: 2018-07-10 | Stop reason: SDUPTHER

## 2018-05-25 RX ORDER — TRIAMTERENE AND HYDROCHLOROTHIAZIDE 37.5; 25 MG/1; MG/1
1 TABLET ORAL DAILY
Qty: 90 TABLET | Refills: 0 | Status: SHIPPED | OUTPATIENT
Start: 2018-05-25 | End: 2018-07-10 | Stop reason: SDUPTHER

## 2018-06-11 LAB
BUN SERPL-MCNC: 14 MG/DL (ref 7–25)
BUN/CREAT SERPL: NORMAL (CALC) (ref 6–22)
CALCIUM SERPL-MCNC: 9.3 MG/DL (ref 8.6–10.4)
CHLORIDE SERPL-SCNC: 103 MMOL/L (ref 98–110)
CO2 SERPL-SCNC: 30 MMOL/L (ref 20–31)
CREAT SERPL-MCNC: 0.83 MG/DL (ref 0.6–0.93)
GLUCOSE SERPL-MCNC: 93 MG/DL (ref 65–99)
POTASSIUM SERPL-SCNC: 3.8 MMOL/L (ref 3.5–5.3)
SL AMB EGFR AFRICAN AMERICAN: 82 ML/MIN/1.73M2
SL AMB EGFR NON AFRICAN AMERICAN: 70 ML/MIN/1.73M2
SODIUM SERPL-SCNC: 141 MMOL/L (ref 135–146)

## 2018-07-10 ENCOUNTER — OFFICE VISIT (OUTPATIENT)
Dept: FAMILY MEDICINE CLINIC | Facility: CLINIC | Age: 73
End: 2018-07-10
Payer: COMMERCIAL

## 2018-07-10 VITALS
BODY MASS INDEX: 27.89 KG/M2 | HEART RATE: 70 BPM | DIASTOLIC BLOOD PRESSURE: 76 MMHG | HEIGHT: 63 IN | WEIGHT: 157.4 LBS | SYSTOLIC BLOOD PRESSURE: 122 MMHG | OXYGEN SATURATION: 97 % | TEMPERATURE: 97.7 F

## 2018-07-10 DIAGNOSIS — E78.5 HYPERLIPIDEMIA, UNSPECIFIED HYPERLIPIDEMIA TYPE: ICD-10-CM

## 2018-07-10 DIAGNOSIS — M85.80 OSTEOPENIA, UNSPECIFIED LOCATION: ICD-10-CM

## 2018-07-10 DIAGNOSIS — Z00.00 MEDICARE ANNUAL WELLNESS VISIT, SUBSEQUENT: Primary | ICD-10-CM

## 2018-07-10 DIAGNOSIS — I10 ESSENTIAL HYPERTENSION: ICD-10-CM

## 2018-07-10 DIAGNOSIS — I10 BENIGN ESSENTIAL HYPERTENSION: ICD-10-CM

## 2018-07-10 PROBLEM — R79.89 CREATININE ELEVATION: Status: RESOLVED | Noted: 2018-04-16 | Resolved: 2018-07-10

## 2018-07-10 PROBLEM — IMO0002 CREATININE ELEVATION: Status: RESOLVED | Noted: 2018-04-16 | Resolved: 2018-07-10

## 2018-07-10 PROCEDURE — 3008F BODY MASS INDEX DOCD: CPT | Performed by: PHYSICIAN ASSISTANT

## 2018-07-10 PROCEDURE — 1101F PT FALLS ASSESS-DOCD LE1/YR: CPT | Performed by: PHYSICIAN ASSISTANT

## 2018-07-10 PROCEDURE — G0439 PPPS, SUBSEQ VISIT: HCPCS | Performed by: PHYSICIAN ASSISTANT

## 2018-07-10 PROCEDURE — 3725F SCREEN DEPRESSION PERFORMED: CPT | Performed by: PHYSICIAN ASSISTANT

## 2018-07-10 PROCEDURE — 99214 OFFICE O/P EST MOD 30 MIN: CPT | Performed by: PHYSICIAN ASSISTANT

## 2018-07-10 RX ORDER — TRIAMTERENE AND HYDROCHLOROTHIAZIDE 37.5; 25 MG/1; MG/1
1 TABLET ORAL DAILY
Qty: 90 TABLET | Refills: 1 | Status: SHIPPED | OUTPATIENT
Start: 2018-07-10 | End: 2019-01-10 | Stop reason: SDUPTHER

## 2018-07-10 RX ORDER — LOVASTATIN 20 MG/1
20 TABLET ORAL DAILY
Qty: 90 TABLET | Refills: 1 | Status: SHIPPED | OUTPATIENT
Start: 2018-07-10 | End: 2019-01-10 | Stop reason: SDUPTHER

## 2018-07-10 NOTE — PROGRESS NOTES
Assessment and Plan:    Problem List Items Addressed This Visit     None      Visit Diagnoses     Medicare annual wellness visit, subsequent    -  Primary        Health Maintenance Due   Topic Date Due    Hepatitis C Screening  1945    SLP PLAN OF CARE  1945    DTaP,Tdap,and Td Vaccines (1 - Tdap) 02/10/1967    PNEUMOCOCCAL POLYSACCHARIDE VACCINE AGE 65 AND OVER  02/10/2011    GLAUCOMA SCREENING 65 + YR  11/02/2016    MAMMOGRAM  07/11/2018         HPI:  Jed Sy is a 68 y o  female here for her Subsequent Wellness Visit  2    Patient Active Problem List   Diagnosis    Benign essential hypertension    Cardiac murmur    Hyperlipidemia    Osteopenia    Polyp of sigmoid colon     No past medical history on file  No past surgical history on file  No family history on file  History   Smoking Status    Former Smoker   Smokeless Tobacco    Never Used     History   Alcohol Use No      History   Drug Use No       Current Outpatient Prescriptions   Medication Sig Dispense Refill    albuterol (PROAIR HFA) 90 mcg/act inhaler Inhale 2 puffs every 4 (four) hours as needed      Calcium Carb-Cholecalciferol (CALCIUM 1000 + D PO) Take by mouth      coenzyme Q-10 100 MG capsule Take by mouth      lovastatin (MEVACOR) 20 mg tablet Take 1 tablet (20 mg total) by mouth daily 90 tablet 0    Multiple Vitamins-Minerals (MULTI FOR HER) TABS Take by mouth      Omega-3 Fatty Acids (FISH OIL) 645 MG CAPS Take by mouth      triamcinolone (KENALOG) 0 1 % oral topical paste Apply to the mouth or throat      triamterene-hydrochlorothiazide (MAXZIDE-25) 37 5-25 mg per tablet Take 1 tablet by mouth daily 90 tablet 0     No current facility-administered medications for this visit        No Known Allergies  Immunization History   Administered Date(s) Administered     Influenza (IM) Preservative Free 12/20/2012    Influenza Quadrivalent Preservative Free 3 years and older IM 11/11/2015, 11/18/2016    Influenza Split High Dose Preservative Free IM 10/23/2013, 11/11/2014, 11/15/2017    Pneumococcal Conjugate 13-Valent 11/11/2014    Zoster 06/02/2015       Patient Care Team:  Yovani Gutierrez MD as PCP - General  QUINCY Boyer MD Dane Cirri, PA-C Robyn Greulich, MD      Medicare Screening Tests and Risk Assessments:  AWV Clinical     ISAR:   Previous hospitalizations?:  No       Once in a Lifetime Medicare Screening:   EKG performed?:  No        Medicare Screening Tests and Risk Assessment:   AAA Risk Assessment     Tobacco use (males only):  No   Age over 72 (males only):  No    Osteoporosis Risk Assessment     Female:  Yes   :  Yes :  No   Age over 48:  Yes Low body weight (<127lbs):  No   Tobacco use:  No Alcohol use:  No   Low calcium diet:  No PMHX of fractures:  No   HIV Risk Assessment    None indicated:  Yes        Drug and Alcohol Use:   Tobacco use    Cigarettes:  former smoker    Smokeless:  never used smokeless tobacco    Tobacco use duration    Tobacco Cessation Readiness    Alcohol use    Alcohol use:  never    Alcohol Treatment Readiness   Illicit Drug Use    Drug use:  never        Diet & Exercise:   Diet   What is your diet?:  Regular   How many servings a day of the following:   Exercise    Do you currently exercise?:  yes    Frequency:  occasional    Type of exercise:  walking       Cognitive Impairment Screening:   Depression screening preformed:  Yes    Depression screening results:  negative for symptoms   Cognitive Impairment Screening    Do you have difficulty learning or retaining new information?:  No Do you have difficulty handling new tasks?:  No   Do you have difficulty with reasoning?:  No Do you have difficulty with spatial ability and orientation?:  No   Do you have difficulty with language?:  No Do you have difficulty with behavior?:  No       Functional Ability/Level of Safety:   Hearing    Hearing difficulties:  No Bilateral:  normal   Left: normal Right:  normal   Hearing Impairment Assessment    Hearing status:  No impairment   Do your family members ever complain that you turn on the radio or T V  too loudly?:  No Do you find that other people have to repeat themselves when talking to you?:  No   Do you have difficulty hearing while talking on the phone?:  No Has anyone ever told you that you are speaking too loudly when talking with them?:  No   Do you have trouble hearing the doorbell or phone ringing?:  No Do you have difficulty hearing such that you feel frustrated talking to people?:  No   Do you feel sad because you cannot hear well?:  No Do you feel inconvienced due to your hearing problem?:  No   Do you think you would be a happier person if you could hear better?:  No Would you be willing to go for a hearing aid fitting if suggested?:  No   Current Activities    Help needed with the folllowing:    Using the phone:  No Transportation:  No   Shopping:  No Preparing Meals:  No   Doing Housework:  No Doing Laundry:  No   Managing Medications:  No Managing Money:  No   ADL    Feeding:  Independant   Oral hygiene and Facial grooming:  Independant   Bathing:  Independant   Upper Body Dressing:  Independant   Lower Body Dressing:  Independant   Toileting:  Independant   Bed Mobility:  Independant   Fall Risk   Have you fallen in the last 12 months?:  No    Injury History       Home Safety:   Are there hazards in your environment?:  No   If you fell, would you need help to get back up from the ground?:  No Do you have problems or concerns getting in/out of a bed, chair, tub, or toilet?:  No   Do you feel unsteady when walking?:  No Is your activity limited by pain?:  No   Do you have handrails and grab-bars in the home?:  Yes Are emergency numbers kept by the phone and regularly updated?:  Yes   Are you and/or family members aware of the dangers of smoking in bed?:  No    Do you have working smoke alarms and fire extinguisher?:  Yes    Have you left the stove on unsupervised?:  No    Home Safety Risk Factors   Unfamilar with surroundings:  No Uneven floors:  No   Stairs or handrail saftey risk:  No Loose rugs:  No   Household clutter:  No Poor household lighting:  No   No grab bars in bathroom:  Yes Further evaluation needed:  No       Advanced Directives:   Advanced Directives    Living Will:  No    Patient's End of Life Decisions        Urinary Incontinence:   Do you have urinary incontinence?:  No        Glaucoma:            Provider Screening     Preventative Screening/Counseling:   Cardiovascular Screening/Counseling:   (Labs Q5 years, EKG optional one-time)   General:  Screening Current           Diabetes Screening/Counseling:   (2 tests/year if Pre-Diabetes or 1 test/year if no Diabetes)   General:  Screening Current           Colorectal Cancer Screening/Counseling:   (FOBT Q1 yr; Flex Sig Q4 yrs or Q10 yrs after Screening Colonoscopy; Screening Colonoscpy Q2 yrs High Risk or Q10 yrs Low Risk; Barium Enema Q2 yrs High Risk or Q4 yrs Low Risk)   General:  Patient Declines           Prostate Cancer Screening/Counseling:   (Annual)          Breast Cancer Screening/Counseling:   (Baseline Age 28 - 43; Annual Age 36+)    Due for: Studies:  mammogram         Cervical Cancer Screening/Counseling:   (Annual for High Risk or Childbearing Age with Abnormal Pap in Last 3 yrs; Every 2 all others)   General:  Screening Not Indicated           Osteoporosis Screening/Counseling:   (Every 2 Yrs if at risk or more if medically necessary)   General:  Screening Current Counseling:  Calcium and Vitamin D Intake, Regular Weightbearing Exercise          AAA Screening/Counseling:   (Once per Lifetime with risk factors)     Age over 72 (males only):  No Tobacco use (males only):  No   General:  Screening Not Indicated           Glaucoma Screening/Counseling:   (Annual)   General:  Screening Current          HIV Screening/Counseling:   (Voluntary;  Once annually for high risk OR 3 times for Pregnancy at diagnosis of IUP; 3rd trimester; and at Labor   General:  Screening Not Indicated           Hepatitis C Screening:             Immunizations:   Influenza (annual):   Influenza Recommended Annually   Pneumococcal (Once in a Lifetime):  Lifetime Vaccine Completed   Zostavax (Medicare D Coverage, Pt >64 yo):  Zostavax Vaccine UTD       Other Preventative Couseling (Non-Medicare Wellness Visit Required):       Referrals (Non-Medicare Wellness Visit Required):       Medical Equipment/Suppliers: No

## 2018-07-10 NOTE — PROGRESS NOTES
Assessment/Plan:         Diagnoses and all orders for this visit:    Medicare annual wellness visit, subsequent    Benign essential hypertension  Comments:    Hypertension at goal continue current regimen  Hyperlipidemia, unspecified hyperlipidemia type  Comments:    Continue low-fat diet and statin therapy  Osteopenia, unspecified location  Comments:    Patient to continue over-the-counter calcium vitamin-D and weight-bearing exercise for bone health          Subjective:      Patient ID: Noreen Reynoso is a 68 y o  female  Patient presents for follow up chronic conditions  Patient has hypertension which is well controlled with Maxzide 25 mg  Patient taking lovastatin 20 mg in eating low-fat diet for hyperlipidemia  Patient has osteopenia she is on over-the-counter calcium and vitamin-D supplements  Patient had abnormal kidney functions on prior lab work  Recent lab work showed normal renal functions  The following portions of the patient's history were reviewed and updated as appropriate:   She  has no past medical history on file  She   Patient Active Problem List    Diagnosis Date Noted    Polyp of sigmoid colon 04/16/2018    Osteopenia 11/18/2016    Cardiac murmur 02/17/2016    Benign essential hypertension 06/11/2012    Hyperlipidemia 06/11/2012     She  has no past surgical history on file    Current Outpatient Prescriptions   Medication Sig Dispense Refill    albuterol (PROAIR HFA) 90 mcg/act inhaler Inhale 2 puffs every 4 (four) hours as needed      Calcium Carb-Cholecalciferol (CALCIUM 1000 + D PO) Take by mouth      coenzyme Q-10 100 MG capsule Take by mouth      lovastatin (MEVACOR) 20 mg tablet Take 1 tablet (20 mg total) by mouth daily 90 tablet 0    Multiple Vitamins-Minerals (MULTI FOR HER) TABS Take by mouth      Omega-3 Fatty Acids (FISH OIL) 645 MG CAPS Take by mouth      triamcinolone (KENALOG) 0 1 % oral topical paste Apply to the mouth or throat      triamterene-hydrochlorothiazide (MAXZIDE-25) 37 5-25 mg per tablet Take 1 tablet by mouth daily 90 tablet 0     No current facility-administered medications for this visit  Current Outpatient Prescriptions on File Prior to Visit   Medication Sig    albuterol (PROAIR HFA) 90 mcg/act inhaler Inhale 2 puffs every 4 (four) hours as needed    Calcium Carb-Cholecalciferol (CALCIUM 1000 + D PO) Take by mouth    coenzyme Q-10 100 MG capsule Take by mouth    lovastatin (MEVACOR) 20 mg tablet Take 1 tablet (20 mg total) by mouth daily    Multiple Vitamins-Minerals (MULTI FOR HER) TABS Take by mouth    Omega-3 Fatty Acids (FISH OIL) 645 MG CAPS Take by mouth    triamcinolone (KENALOG) 0 1 % oral topical paste Apply to the mouth or throat    triamterene-hydrochlorothiazide (MAXZIDE-25) 37 5-25 mg per tablet Take 1 tablet by mouth daily     No current facility-administered medications on file prior to visit  She has No Known Allergies       Review of Systems   Constitutional: Negative for unexpected weight change  HENT: Negative for ear pain and sore throat  Respiratory: Negative for cough, shortness of breath and wheezing  Cardiovascular: Negative for chest pain, palpitations and leg swelling  Gastrointestinal: Negative for abdominal pain, blood in stool and constipation  Genitourinary: Negative for difficulty urinating  Musculoskeletal: Negative for arthralgias and myalgias  Skin: Negative for rash  Neurological: Negative for dizziness, syncope and headaches  Objective:      /76 (BP Location: Left arm, Patient Position: Sitting, Cuff Size: Standard)   Pulse 70   Temp 97 7 °F (36 5 °C)   Ht 5' 3" (1 6 m)   Wt 71 4 kg (157 lb 6 4 oz)   SpO2 97%   BMI 27 88 kg/m²          Physical Exam   Constitutional: She is oriented to person, place, and time  She appears well-developed and well-nourished     HENT:   Right Ear: Tympanic membrane, external ear and ear canal normal    Left Ear: Tympanic membrane, external ear and ear canal normal    Mouth/Throat: Oropharynx is clear and moist    Eyes: Conjunctivae are normal  Pupils are equal, round, and reactive to light  Neck: Neck supple  Carotid bruit is not present  Cardiovascular: Normal rate and regular rhythm  Murmur heard  Pulmonary/Chest: Effort normal and breath sounds normal    Abdominal: Soft  Bowel sounds are normal  She exhibits no mass  There is no tenderness  Musculoskeletal: She exhibits no edema  Lymphadenopathy:     She has no cervical adenopathy  Neurological: She is oriented to person, place, and time  Skin: Skin is warm and dry  Psychiatric: She has a normal mood and affect  Judgment and thought content normal    Nursing note and vitals reviewed

## 2018-09-26 LAB
ALBUMIN SERPL-MCNC: 4.1 G/DL (ref 3.6–5.1)
ALBUMIN/GLOB SERPL: 1.6 (CALC) (ref 1–2.5)
ALP SERPL-CCNC: 80 U/L (ref 33–130)
ALT SERPL-CCNC: 17 U/L (ref 6–29)
AST SERPL-CCNC: 19 U/L (ref 10–35)
BILIRUB SERPL-MCNC: 0.5 MG/DL (ref 0.2–1.2)
BUN SERPL-MCNC: 14 MG/DL (ref 7–25)
BUN/CREAT SERPL: NORMAL (CALC) (ref 6–22)
CALCIUM SERPL-MCNC: 9.7 MG/DL (ref 8.6–10.4)
CHLORIDE SERPL-SCNC: 100 MMOL/L (ref 98–110)
CHOLEST SERPL-MCNC: 179 MG/DL
CHOLEST/HDLC SERPL: 4.7 (CALC)
CO2 SERPL-SCNC: 32 MMOL/L (ref 20–32)
CREAT SERPL-MCNC: 0.85 MG/DL (ref 0.6–0.93)
GLOBULIN SER CALC-MCNC: 2.6 G/DL (CALC) (ref 1.9–3.7)
GLUCOSE SERPL-MCNC: 90 MG/DL (ref 65–99)
HDLC SERPL-MCNC: 38 MG/DL
LDLC SERPL CALC-MCNC: 107 MG/DL (CALC)
NONHDLC SERPL-MCNC: 141 MG/DL (CALC)
POTASSIUM SERPL-SCNC: 3.8 MMOL/L (ref 3.5–5.3)
PROT SERPL-MCNC: 6.7 G/DL (ref 6.1–8.1)
SL AMB EGFR AFRICAN AMERICAN: 79 ML/MIN/1.73M2
SL AMB EGFR NON AFRICAN AMERICAN: 68 ML/MIN/1.73M2
SODIUM SERPL-SCNC: 140 MMOL/L (ref 135–146)
TRIGL SERPL-MCNC: 216 MG/DL

## 2018-10-10 ENCOUNTER — OFFICE VISIT (OUTPATIENT)
Dept: FAMILY MEDICINE CLINIC | Facility: CLINIC | Age: 73
End: 2018-10-10
Payer: COMMERCIAL

## 2018-10-10 ENCOUNTER — APPOINTMENT (OUTPATIENT)
Dept: RADIOLOGY | Facility: MEDICAL CENTER | Age: 73
End: 2018-10-10
Payer: COMMERCIAL

## 2018-10-10 VITALS
TEMPERATURE: 97.7 F | HEART RATE: 61 BPM | SYSTOLIC BLOOD PRESSURE: 120 MMHG | HEIGHT: 63 IN | WEIGHT: 155.2 LBS | DIASTOLIC BLOOD PRESSURE: 74 MMHG | OXYGEN SATURATION: 97 % | BODY MASS INDEX: 27.5 KG/M2

## 2018-10-10 DIAGNOSIS — I10 BENIGN ESSENTIAL HYPERTENSION: ICD-10-CM

## 2018-10-10 DIAGNOSIS — E78.5 HYPERLIPIDEMIA, UNSPECIFIED HYPERLIPIDEMIA TYPE: ICD-10-CM

## 2018-10-10 DIAGNOSIS — J44.9 CHRONIC OBSTRUCTIVE PULMONARY DISEASE, UNSPECIFIED COPD TYPE (HCC): Primary | ICD-10-CM

## 2018-10-10 DIAGNOSIS — Z23 NEED FOR INFLUENZA VACCINATION: ICD-10-CM

## 2018-10-10 DIAGNOSIS — M85.80 OSTEOPENIA, UNSPECIFIED LOCATION: ICD-10-CM

## 2018-10-10 DIAGNOSIS — J44.9 CHRONIC OBSTRUCTIVE PULMONARY DISEASE, UNSPECIFIED COPD TYPE (HCC): ICD-10-CM

## 2018-10-10 LAB — FEV1: NORMAL LITERS

## 2018-10-10 PROCEDURE — 99214 OFFICE O/P EST MOD 30 MIN: CPT | Performed by: PHYSICIAN ASSISTANT

## 2018-10-10 PROCEDURE — 1160F RVW MEDS BY RX/DR IN RCRD: CPT | Performed by: PHYSICIAN ASSISTANT

## 2018-10-10 PROCEDURE — 1036F TOBACCO NON-USER: CPT | Performed by: PHYSICIAN ASSISTANT

## 2018-10-10 PROCEDURE — 90662 IIV NO PRSV INCREASED AG IM: CPT

## 2018-10-10 PROCEDURE — 3078F DIAST BP <80 MM HG: CPT | Performed by: PHYSICIAN ASSISTANT

## 2018-10-10 PROCEDURE — 71046 X-RAY EXAM CHEST 2 VIEWS: CPT

## 2018-10-10 PROCEDURE — 3074F SYST BP LT 130 MM HG: CPT | Performed by: PHYSICIAN ASSISTANT

## 2018-10-10 PROCEDURE — 1160F RVW MEDS BY RX/DR IN RCRD: CPT

## 2018-10-10 PROCEDURE — 4040F PNEUMOC VAC/ADMIN/RCVD: CPT

## 2018-10-10 PROCEDURE — 94010 BREATHING CAPACITY TEST: CPT | Performed by: PHYSICIAN ASSISTANT

## 2018-10-10 PROCEDURE — G0008 ADMIN INFLUENZA VIRUS VAC: HCPCS

## 2018-10-10 RX ORDER — ALBUTEROL SULFATE 90 UG/1
2 AEROSOL, METERED RESPIRATORY (INHALATION) EVERY 4 HOURS PRN
Qty: 3 INHALER | Refills: 0 | Status: SHIPPED | OUTPATIENT
Start: 2018-10-10 | End: 2020-04-10 | Stop reason: SDUPTHER

## 2018-10-10 NOTE — PROGRESS NOTES
Assessment/Plan:     Diagnoses and all orders for this visit:    Chronic obstructive pulmonary disease, unspecified COPD type ([facility] )  Comments:  Increasing shortness of breath and use of inhaler  Check spirometry and chest x-ray  Will start long-acting once a day Spiriva capsules  Orders:  -     POCT spirometry  -     XR chest pa & lateral; Future  -     tiotropium (SPIRIVA) 18 mcg inhalation capsule; Place 1 capsule (18 mcg total) into inhaler and inhale daily  -     albuterol (PROAIR HFA) 90 mcg/act inhaler; Inhale 2 puffs every 4 (four) hours as needed for shortness of breath    Need for influenza vaccination  -     influenza vaccine, 1705-4842, high-dose, PF 0 5 mL, for patients 65 yr+ (FLUZONE HIGH-DOSE)    Benign essential hypertension  Comments:  Blood pressure is at goal continue current regimen  Osteopenia, unspecified location  Comments:  Continue calcium vitamin-D and weight-bearing exercise  Orders:  -     DXA bone density spine hip and pelvis; Future    Hyperlipidemia, unspecified hyperlipidemia type  Comments:  Low-fat diet and statin therapy  Decrease carbs and sugars to help lower triglycerides  Subjective:      Patient ID: Dairl Bumpers is a 68 y o  female  Patient presents for follow up chronic conditions  Patient has hypertension which is controlled with Maxzide 25 mg  Patient is taking lovastatin 20 mg for lipid control  Patient had her mammogram and follow up with general surgeon for breast surveillance  Mammogram was stable  Patient states she has been having increase of shortness of breath and chest congestion mainly in the a m  [place] Patient states she has had increased use of her ProAir as well  No fever no earache some wheezing  Records review show last chest x-ray was 2016 was normal   Spirometry in 2016 showed FEV of 56  Mild to moderate obstructive disease  Her current labs show normal CMP  Total cholesterol at 179 HDL 38   Triglycerides are high at 216  Discussed low carb low sugar diet  The following portions of the patient's history were reviewed and updated as appropriate:   She  has a past medical history of Multiple benign polyps of large intestine  She   Patient Active Problem List    Diagnosis Date Noted    COPD (chronic obstructive pulmonary disease) ([facility] ) 10/10/2018    Polyp of sigmoid colon 04/16/2018    Osteopenia 11/18/2016    Cardiac murmur 02/17/2016    Benign essential hypertension 06/11/2012    Hyperlipidemia 06/11/2012     Current Outpatient Prescriptions   Medication Sig Dispense Refill    albuterol (PROAIR HFA) 90 mcg/act inhaler Inhale 2 puffs every 4 (four) hours as needed for shortness of breath 3 Inhaler 0    Calcium Carb-Cholecalciferol (CALCIUM 1000 + D PO) Take by mouth      coenzyme Q-10 100 MG capsule Take by mouth      lovastatin (MEVACOR) 20 mg tablet Take 1 tablet (20 mg total) by mouth daily 90 tablet 1    Multiple Vitamins-Minerals (MULTI FOR HER) TABS Take by mouth      Omega-3 Fatty Acids (FISH OIL) 645 MG CAPS Take by mouth      tiotropium (SPIRIVA) 18 mcg inhalation capsule Place 1 capsule (18 mcg total) into inhaler and inhale daily 30 capsule 2    triamcinolone (KENALOG) 0 1 % oral topical paste Apply to the mouth or throat      triamterene-hydrochlorothiazide (MAXZIDE-25) 37 5-25 mg per tablet Take 1 tablet by mouth daily 90 tablet 1     No current facility-administered medications for this visit        Current Outpatient Prescriptions on File Prior to Visit   Medication Sig    Calcium Carb-Cholecalciferol (CALCIUM 1000 + D PO) Take by mouth    coenzyme Q-10 100 MG capsule Take by mouth    lovastatin (MEVACOR) 20 mg tablet Take 1 tablet (20 mg total) by mouth daily    Multiple Vitamins-Minerals (MULTI FOR HER) TABS Take by mouth    Omega-3 Fatty Acids (FISH OIL) 645 MG CAPS Take by mouth    triamcinolone (KENALOG) 0 1 % oral topical paste Apply to the mouth or throat    triamterene-hydrochlorothiazide (MAXZIDE-25) 37 5-25 mg per tablet Take 1 tablet by mouth daily     No current facility-administered medications on file prior to visit  She has No Known Allergies       Review of Systems   Constitutional: Negative for unexpected weight change  HENT: Negative for ear pain and sore throat  Eyes: Negative for visual disturbance  Respiratory: Positive for cough, shortness of breath and wheezing  Cardiovascular: Negative for chest pain and leg swelling  Gastrointestinal: Negative for abdominal pain, blood in stool, constipation and diarrhea  Genitourinary: Negative for difficulty urinating  Musculoskeletal: Negative for arthralgias and myalgias  Skin: Negative for rash  Neurological: Negative for dizziness, syncope and headaches  Psychiatric/Behavioral: Negative for sleep disturbance  Objective:        Physical Exam   Constitutional: She is oriented to person, place, and time  She appears well-developed and well-nourished  No distress  HENT:   Head: Normocephalic and atraumatic  Right Ear: Tympanic membrane, external ear and ear canal normal    Left Ear: Tympanic membrane, external ear and ear canal normal    Mouth/Throat: Oropharynx is clear and moist  No oropharyngeal exudate or posterior oropharyngeal erythema  Eyes: Pupils are equal, round, and reactive to light  Conjunctivae are normal    Neck: Normal range of motion  Neck supple  Carotid bruit is not present  No thyromegaly present  Cardiovascular: Normal rate and regular rhythm  Exam reveals no gallop and no friction rub  No murmur heard  Pulmonary/Chest: Effort normal and breath sounds normal  No respiratory distress  She has no wheezes  Abdominal: Soft  Bowel sounds are normal  She exhibits no distension and no mass  There is no tenderness  Musculoskeletal: Normal range of motion  She exhibits no edema  Lymphadenopathy:     She has no cervical adenopathy     Neurological: She is alert and oriented to person, place, and time  Skin: Skin is warm and dry  No rash noted  She is not diaphoretic  No erythema  Psychiatric: She has a normal mood and affect  Her behavior is normal  Judgment and thought content normal    Nursing note and vitals reviewed

## 2018-10-16 DIAGNOSIS — J44.9 CHRONIC OBSTRUCTIVE PULMONARY DISEASE, UNSPECIFIED COPD TYPE (HCC): ICD-10-CM

## 2018-10-16 NOTE — TELEPHONE ENCOUNTER
----- Message from Shasha Lui PA-C sent at 10/15/2018  8:20 AM EDT -----  Call  Pt  Her  cxr  Shows  Mild  Copd  Changes    No pnuemonia

## 2018-11-21 ENCOUNTER — HOSPITAL ENCOUNTER (OUTPATIENT)
Dept: RADIOLOGY | Facility: MEDICAL CENTER | Age: 73
Discharge: HOME | End: 2018-11-21
Payer: COMMERCIAL

## 2018-11-21 DIAGNOSIS — M85.80 OSTEOPENIA, UNSPECIFIED LOCATION: ICD-10-CM

## 2018-11-21 PROCEDURE — 77080 DXA BONE DENSITY AXIAL: CPT

## 2018-11-30 ENCOUNTER — TELEPHONE (OUTPATIENT)
Dept: FAMILY MEDICINE CLINIC | Facility: CLINIC | Age: 73
End: 2018-11-30

## 2018-11-30 NOTE — TELEPHONE ENCOUNTER
----- Message from Andrea Ferrara PA-C sent at 11/29/2018  8:35 AM EST -----  Call  Pt  Her  dexa  Scan  Shows  Osteopenia  Continue  Vit  D and  Calcium  Along  With  Weight  Bearing  Exercises

## 2019-01-09 PROBLEM — N60.99 ATYPICAL DUCTAL HYPERPLASIA OF BREAST: Status: ACTIVE | Noted: 2019-01-09

## 2019-01-09 PROBLEM — N60.19 FIBROCYSTIC DISEASE OF BREAST: Status: ACTIVE | Noted: 2019-01-09

## 2019-01-10 ENCOUNTER — OFFICE VISIT (OUTPATIENT)
Dept: FAMILY MEDICINE CLINIC | Facility: CLINIC | Age: 74
End: 2019-01-10
Payer: COMMERCIAL

## 2019-01-10 VITALS
BODY MASS INDEX: 27.89 KG/M2 | HEART RATE: 73 BPM | SYSTOLIC BLOOD PRESSURE: 122 MMHG | WEIGHT: 157.4 LBS | TEMPERATURE: 97.8 F | OXYGEN SATURATION: 98 % | DIASTOLIC BLOOD PRESSURE: 78 MMHG | HEIGHT: 63 IN

## 2019-01-10 DIAGNOSIS — I10 ESSENTIAL HYPERTENSION: ICD-10-CM

## 2019-01-10 DIAGNOSIS — E78.5 HYPERLIPIDEMIA, UNSPECIFIED HYPERLIPIDEMIA TYPE: ICD-10-CM

## 2019-01-10 DIAGNOSIS — I10 BENIGN ESSENTIAL HYPERTENSION: ICD-10-CM

## 2019-01-10 DIAGNOSIS — J44.9 CHRONIC OBSTRUCTIVE PULMONARY DISEASE, UNSPECIFIED COPD TYPE (HCC): Primary | ICD-10-CM

## 2019-01-10 PROCEDURE — 99214 OFFICE O/P EST MOD 30 MIN: CPT | Performed by: PHYSICIAN ASSISTANT

## 2019-01-10 PROCEDURE — 3074F SYST BP LT 130 MM HG: CPT | Performed by: PHYSICIAN ASSISTANT

## 2019-01-10 PROCEDURE — 1036F TOBACCO NON-USER: CPT | Performed by: PHYSICIAN ASSISTANT

## 2019-01-10 PROCEDURE — 3008F BODY MASS INDEX DOCD: CPT | Performed by: PHYSICIAN ASSISTANT

## 2019-01-10 PROCEDURE — 1160F RVW MEDS BY RX/DR IN RCRD: CPT | Performed by: PHYSICIAN ASSISTANT

## 2019-01-10 PROCEDURE — 3078F DIAST BP <80 MM HG: CPT | Performed by: PHYSICIAN ASSISTANT

## 2019-01-10 RX ORDER — LOVASTATIN 20 MG/1
20 TABLET ORAL DAILY
Qty: 90 TABLET | Refills: 1 | Status: SHIPPED | OUTPATIENT
Start: 2019-01-10 | End: 2019-07-22

## 2019-01-10 RX ORDER — TRIAMTERENE AND HYDROCHLOROTHIAZIDE 37.5; 25 MG/1; MG/1
1 TABLET ORAL DAILY
Qty: 90 TABLET | Refills: 1 | Status: SHIPPED | OUTPATIENT
Start: 2019-01-10 | End: 2019-07-22

## 2019-01-10 NOTE — PROGRESS NOTES
Assessment/Plan:     Diagnoses and all orders for this visit:    Chronic obstructive pulmonary disease, unspecified COPD type ([facility] )  Comments:  COPD stable  Subjectively improved with the addition of Spiriva  Benign essential hypertension  Comments:  Blood pressure at goal continue current regimen  Orders:  -     Comprehensive metabolic panel; Future  -     Lipid panel; Future    Hyperlipidemia, unspecified hyperlipidemia type  Comments:  Continue low-fat diet and statin therapy  Orders:  -     Lipid panel; Future  -     lovastatin (MEVACOR) 20 mg tablet; Take 1 tablet (20 mg total) by mouth daily    Hyperlipidemia, unspecified hyperlipidemia type  -     Lipid panel; Future  -     lovastatin (MEVACOR) 20 mg tablet; Take 1 tablet (20 mg total) by mouth daily    Essential hypertension  -     triamterene-hydrochlorothiazide (MAXZIDE-25) 37 5-25 mg per tablet; Take 1 tablet by mouth daily          Subjective:      Patient ID: Norma Combs is a 68 y o  female  Patient presents for follow up chronic conditions  Patient has mild case of COPD  Last visit she was started on Spiriva capsule 1 inhalation daily  Patient states she is noted a significant improvement in her breathing  Patient still uses ProAir but not as often  Flu vaccine is up-to-date for this season  Patient is on max eye generic 25 mg once a day for her hypertension  Blood pressure is at goal   Patient is on lovastatin and low-fat diet for lipid control  The following portions of the patient's history were reviewed and updated as appropriate:   She  has a past medical history of Multiple benign polyps of large intestine    She   Patient Active Problem List    Diagnosis Date Noted    Atypical ductal hyperplasia of breast 01/09/2019    Fibrocystic disease of breast 01/09/2019    COPD (chronic obstructive pulmonary disease) ([facility] ) 10/10/2018    Polyp of sigmoid colon 04/16/2018    Osteopenia 11/18/2016    Family history of malignant neoplasm of breast 07/15/2016    Cardiac murmur 02/17/2016    Benign essential hypertension 06/11/2012    Hyperlipidemia 06/11/2012     Current Outpatient Prescriptions   Medication Sig Dispense Refill    albuterol (PROAIR HFA) 90 mcg/act inhaler Inhale 2 puffs every 4 (four) hours as needed for shortness of breath 3 Inhaler 0    Calcium Carb-Cholecalciferol (CALCIUM 1000 + D PO) Take by mouth      coenzyme Q-10 100 MG capsule Take by mouth      lovastatin (MEVACOR) 20 mg tablet Take 1 tablet (20 mg total) by mouth daily 90 tablet 1    Multiple Vitamins-Minerals (MULTI FOR HER) TABS Take by mouth      Omega-3 Fatty Acids (FISH OIL) 645 MG CAPS Take by mouth      tiotropium (SPIRIVA) 18 mcg inhalation capsule Place 1 capsule (18 mcg total) into inhaler and inhale daily 90 capsule 1    triamcinolone (KENALOG) 0 1 % oral topical paste Apply to the mouth or throat      triamterene-hydrochlorothiazide (MAXZIDE-25) 37 5-25 mg per tablet Take 1 tablet by mouth daily 90 tablet 1     No current facility-administered medications for this visit        Current Outpatient Prescriptions on File Prior to Visit   Medication Sig    albuterol (PROAIR HFA) 90 mcg/act inhaler Inhale 2 puffs every 4 (four) hours as needed for shortness of breath    Calcium Carb-Cholecalciferol (CALCIUM 1000 + D PO) Take by mouth    coenzyme Q-10 100 MG capsule Take by mouth    Multiple Vitamins-Minerals (MULTI FOR HER) TABS Take by mouth    Omega-3 Fatty Acids (FISH OIL) 645 MG CAPS Take by mouth    tiotropium (SPIRIVA) 18 mcg inhalation capsule Place 1 capsule (18 mcg total) into inhaler and inhale daily    triamcinolone (KENALOG) 0 1 % oral topical paste Apply to the mouth or throat    [DISCONTINUED] lovastatin (MEVACOR) 20 mg tablet Take 1 tablet (20 mg total) by mouth daily    [DISCONTINUED] triamterene-hydrochlorothiazide (MAXZIDE-25) 37 5-25 mg per tablet Take 1 tablet by mouth daily     No current facility-administered medications on file prior to visit  She has No Known Allergies       Review of Systems   Constitutional: Negative for unexpected weight change  HENT: Negative for ear pain and sore throat  Eyes: Negative for visual disturbance  Respiratory: Negative for cough, shortness of breath and wheezing  Cardiovascular: Negative for chest pain and leg swelling  Gastrointestinal: Negative for abdominal pain, blood in stool, constipation, diarrhea, nausea and vomiting  Genitourinary: Negative for difficulty urinating  Musculoskeletal: Negative for arthralgias and myalgias  Skin: Negative for rash  Neurological: Negative for dizziness, syncope, light-headedness and headaches  Psychiatric/Behavioral: Negative for self-injury, sleep disturbance and suicidal ideas  The patient is not nervous/anxious  Objective:        Physical Exam   Constitutional: She is oriented to person, place, and time  She appears well-developed and well-nourished  No distress  HENT:   Head: Normocephalic and atraumatic  Right Ear: Tympanic membrane, external ear and ear canal normal    Left Ear: Tympanic membrane, external ear and ear canal normal    Mouth/Throat: Oropharynx is clear and moist  No oropharyngeal exudate or posterior oropharyngeal erythema  Eyes: Pupils are equal, round, and reactive to light  Conjunctivae are normal    Neck: Normal range of motion  Neck supple  Carotid bruit is not present  No thyromegaly present  Cardiovascular: Normal rate, regular rhythm and normal heart sounds  Exam reveals no gallop and no friction rub  No murmur heard  Pulmonary/Chest: Effort normal and breath sounds normal  No respiratory distress  She has no wheezes  Abdominal: Soft  Bowel sounds are normal  She exhibits no distension and no mass  There is no tenderness  There is no rebound and no guarding  Musculoskeletal: Normal range of motion  She exhibits no edema     Lymphadenopathy:     She has no cervical adenopathy  Neurological: She is alert and oriented to person, place, and time  Skin: Skin is warm and dry  No rash noted  She is not diaphoretic  No erythema  Psychiatric: She has a normal mood and affect  Her behavior is normal  Judgment and thought content normal    Nursing note and vitals reviewed

## 2019-03-27 LAB
ALBUMIN SERPL-MCNC: 4.1 G/DL (ref 3.6–5.1)
ALBUMIN/GLOB SERPL: 1.6 (CALC) (ref 1–2.5)
ALP SERPL-CCNC: 84 U/L (ref 33–130)
ALT SERPL-CCNC: 22 U/L (ref 6–29)
AST SERPL-CCNC: 21 U/L (ref 10–35)
BILIRUB SERPL-MCNC: 0.5 MG/DL (ref 0.2–1.2)
BUN SERPL-MCNC: 16 MG/DL (ref 7–25)
BUN/CREAT SERPL: ABNORMAL (CALC) (ref 6–22)
CALCIUM SERPL-MCNC: 9.3 MG/DL (ref 8.6–10.4)
CHLORIDE SERPL-SCNC: 103 MMOL/L (ref 98–110)
CHOLEST SERPL-MCNC: 196 MG/DL
CHOLEST/HDLC SERPL: 4.9 (CALC)
CO2 SERPL-SCNC: 33 MMOL/L (ref 20–32)
CREAT SERPL-MCNC: 0.92 MG/DL (ref 0.6–0.93)
GLOBULIN SER CALC-MCNC: 2.5 G/DL (CALC) (ref 1.9–3.7)
GLUCOSE SERPL-MCNC: 88 MG/DL (ref 65–99)
HDLC SERPL-MCNC: 40 MG/DL
LDLC SERPL CALC-MCNC: 126 MG/DL (CALC)
NONHDLC SERPL-MCNC: 156 MG/DL (CALC)
POTASSIUM SERPL-SCNC: 3.9 MMOL/L (ref 3.5–5.3)
PROT SERPL-MCNC: 6.6 G/DL (ref 6.1–8.1)
SL AMB EGFR AFRICAN AMERICAN: 72 ML/MIN/1.73M2
SL AMB EGFR NON AFRICAN AMERICAN: 62 ML/MIN/1.73M2
SODIUM SERPL-SCNC: 141 MMOL/L (ref 135–146)
TRIGL SERPL-MCNC: 181 MG/DL

## 2019-04-11 ENCOUNTER — OFFICE VISIT (OUTPATIENT)
Dept: FAMILY MEDICINE CLINIC | Facility: CLINIC | Age: 74
End: 2019-04-11
Payer: COMMERCIAL

## 2019-04-11 VITALS
WEIGHT: 163.8 LBS | HEIGHT: 63 IN | BODY MASS INDEX: 29.02 KG/M2 | OXYGEN SATURATION: 96 % | RESPIRATION RATE: 16 BRPM | SYSTOLIC BLOOD PRESSURE: 130 MMHG | DIASTOLIC BLOOD PRESSURE: 78 MMHG | HEART RATE: 61 BPM | TEMPERATURE: 98 F

## 2019-04-11 DIAGNOSIS — I10 BENIGN ESSENTIAL HYPERTENSION: ICD-10-CM

## 2019-04-11 DIAGNOSIS — Z12.39 SCREENING FOR BREAST CANCER: ICD-10-CM

## 2019-04-11 DIAGNOSIS — E78.5 HYPERLIPIDEMIA, UNSPECIFIED HYPERLIPIDEMIA TYPE: ICD-10-CM

## 2019-04-11 DIAGNOSIS — J44.9 CHRONIC OBSTRUCTIVE PULMONARY DISEASE, UNSPECIFIED COPD TYPE (HCC): Primary | ICD-10-CM

## 2019-04-11 DIAGNOSIS — J44.9 CHRONIC OBSTRUCTIVE PULMONARY DISEASE, UNSPECIFIED COPD TYPE (HCC): ICD-10-CM

## 2019-04-11 PROCEDURE — 1036F TOBACCO NON-USER: CPT | Performed by: PHYSICIAN ASSISTANT

## 2019-04-11 PROCEDURE — 1160F RVW MEDS BY RX/DR IN RCRD: CPT | Performed by: PHYSICIAN ASSISTANT

## 2019-04-11 PROCEDURE — 3078F DIAST BP <80 MM HG: CPT | Performed by: PHYSICIAN ASSISTANT

## 2019-04-11 PROCEDURE — 3075F SYST BP GE 130 - 139MM HG: CPT | Performed by: PHYSICIAN ASSISTANT

## 2019-04-11 PROCEDURE — 3008F BODY MASS INDEX DOCD: CPT | Performed by: PHYSICIAN ASSISTANT

## 2019-04-11 PROCEDURE — 99214 OFFICE O/P EST MOD 30 MIN: CPT | Performed by: PHYSICIAN ASSISTANT

## 2019-07-22 ENCOUNTER — OFFICE VISIT (OUTPATIENT)
Dept: FAMILY MEDICINE CLINIC | Facility: CLINIC | Age: 74
End: 2019-07-22
Payer: COMMERCIAL

## 2019-07-22 VITALS
DIASTOLIC BLOOD PRESSURE: 88 MMHG | OXYGEN SATURATION: 96 % | TEMPERATURE: 97.9 F | HEIGHT: 63 IN | RESPIRATION RATE: 16 BRPM | HEART RATE: 69 BPM | WEIGHT: 157.4 LBS | BODY MASS INDEX: 27.89 KG/M2 | SYSTOLIC BLOOD PRESSURE: 130 MMHG

## 2019-07-22 DIAGNOSIS — E78.5 HYPERLIPIDEMIA, UNSPECIFIED HYPERLIPIDEMIA TYPE: ICD-10-CM

## 2019-07-22 DIAGNOSIS — M79.671 PAIN OF RIGHT HEEL: ICD-10-CM

## 2019-07-22 DIAGNOSIS — J44.9 CHRONIC OBSTRUCTIVE PULMONARY DISEASE, UNSPECIFIED COPD TYPE (HCC): ICD-10-CM

## 2019-07-22 DIAGNOSIS — I10 BENIGN ESSENTIAL HYPERTENSION: ICD-10-CM

## 2019-07-22 DIAGNOSIS — Z00.00 MEDICARE ANNUAL WELLNESS VISIT, SUBSEQUENT: Primary | ICD-10-CM

## 2019-07-22 DIAGNOSIS — I10 ESSENTIAL HYPERTENSION: ICD-10-CM

## 2019-07-22 PROCEDURE — 1125F AMNT PAIN NOTED PAIN PRSNT: CPT | Performed by: PHYSICIAN ASSISTANT

## 2019-07-22 PROCEDURE — G0439 PPPS, SUBSEQ VISIT: HCPCS | Performed by: PHYSICIAN ASSISTANT

## 2019-07-22 PROCEDURE — 1036F TOBACCO NON-USER: CPT | Performed by: PHYSICIAN ASSISTANT

## 2019-07-22 PROCEDURE — 3008F BODY MASS INDEX DOCD: CPT | Performed by: PHYSICIAN ASSISTANT

## 2019-07-22 PROCEDURE — 3075F SYST BP GE 130 - 139MM HG: CPT | Performed by: PHYSICIAN ASSISTANT

## 2019-07-22 PROCEDURE — 99214 OFFICE O/P EST MOD 30 MIN: CPT | Performed by: PHYSICIAN ASSISTANT

## 2019-07-22 PROCEDURE — 1170F FXNL STATUS ASSESSED: CPT | Performed by: PHYSICIAN ASSISTANT

## 2019-07-22 PROCEDURE — 1160F RVW MEDS BY RX/DR IN RCRD: CPT | Performed by: PHYSICIAN ASSISTANT

## 2019-07-22 RX ORDER — LOVASTATIN 20 MG/1
20 TABLET ORAL DAILY
Qty: 90 TABLET | Refills: 1 | Status: SHIPPED | OUTPATIENT
Start: 2019-07-22 | End: 2020-02-05 | Stop reason: SDUPTHER

## 2019-07-22 RX ORDER — TRIAMTERENE AND HYDROCHLOROTHIAZIDE 37.5; 25 MG/1; MG/1
1 TABLET ORAL DAILY
Qty: 90 TABLET | Refills: 1 | Status: SHIPPED | OUTPATIENT
Start: 2019-07-22 | End: 2020-02-05 | Stop reason: SDUPTHER

## 2019-07-22 NOTE — PROGRESS NOTES
Assessment/Plan:     Diagnoses and all orders for this visit:    Medicare annual wellness visit, subsequent    Benign essential hypertension  -     Comprehensive metabolic panel; Future    Chronic obstructive pulmonary disease, unspecified COPD type ([facility] )  Comments:  COPD is stable continue current    Hyperlipidemia, unspecified hyperlipidemia type  Comments:  Continue low-fat diet and statin therapy  Orders:  -     Lipid panel; Future  -     lovastatin (MEVACOR) 20 mg tablet; Take 1 tablet (20 mg total) by mouth daily    Essential hypertension  -     triamterene-hydrochlorothiazide (MAXZIDE-25) 37 5-25 mg per tablet; Take 1 tablet by mouth daily    Hyperlipidemia, unspecified hyperlipidemia type  -     Lipid panel; Future  -     lovastatin (MEVACOR) 20 mg tablet; Take 1 tablet (20 mg total) by mouth daily    Pain of right heel          Subjective:      Patient ID: Kobi Lobo is a 69 y o  female  Patient presents for follow up chronic conditions  Patient has hypertension she is currently on Maxzide 25 mg a day  For lipid control she is on lovastatin 20 mg and low-fat diet  Patient has COPD she is on Spiriva capsule inhaled daily  She also has ProAir for p r n  Use  Patient has osteopenia she is on calcium and vitamin-D  Patient has lost 6 lb  She is watching her portion size and her carb intake  Patient tries to walk as much as possible  Patient states she has been having some right foot pain for the last week  No injury  Patient states in the morning when she steps down she gets a pain in the heel area  Patient states when she presses the area it is tender to touch  Patient states after walking a little bit the pain is relieved        The following portions of the patient's history were reviewed and updated as appropriate:   She   Patient Active Problem List    Diagnosis Date Noted    Atypical ductal hyperplasia of breast 01/09/2019    Fibrocystic disease of breast 01/09/2019    COPD (chronic obstructive pulmonary disease) ([facility] ) 10/10/2018    Polyp of sigmoid colon 04/16/2018    Osteopenia 11/18/2016    Family history of malignant neoplasm of breast 07/15/2016    Cardiac murmur 02/17/2016    Benign essential hypertension 06/11/2012    Hyperlipidemia 06/11/2012     Current Outpatient Medications   Medication Sig Dispense Refill    albuterol (PROAIR HFA) 90 mcg/act inhaler Inhale 2 puffs every 4 (four) hours as needed for shortness of breath 3 Inhaler 0    Calcium Carb-Cholecalciferol (CALCIUM 1000 + D PO) Take by mouth      coenzyme Q-10 100 MG capsule Take by mouth      lovastatin (MEVACOR) 20 mg tablet Take 1 tablet (20 mg total) by mouth daily 90 tablet 1    Multiple Vitamins-Minerals (MULTI FOR HER) TABS Take by mouth      Omega-3 Fatty Acids (FISH OIL) 645 MG CAPS Take by mouth      tiotropium (SPIRIVA) 18 mcg inhalation capsule Place 1 capsule (18 mcg total) into inhaler and inhale daily 90 capsule 1    triamcinolone (KENALOG) 0 1 % oral topical paste Apply to the mouth or throat      triamterene-hydrochlorothiazide (MAXZIDE-25) 37 5-25 mg per tablet Take 1 tablet by mouth daily 90 tablet 1     No current facility-administered medications for this visit        Current Outpatient Medications on File Prior to Visit   Medication Sig    albuterol (PROAIR HFA) 90 mcg/act inhaler Inhale 2 puffs every 4 (four) hours as needed for shortness of breath    Calcium Carb-Cholecalciferol (CALCIUM 1000 + D PO) Take by mouth    coenzyme Q-10 100 MG capsule Take by mouth    Multiple Vitamins-Minerals (MULTI FOR HER) TABS Take by mouth    Omega-3 Fatty Acids (FISH OIL) 645 MG CAPS Take by mouth    tiotropium (SPIRIVA) 18 mcg inhalation capsule Place 1 capsule (18 mcg total) into inhaler and inhale daily    triamcinolone (KENALOG) 0 1 % oral topical paste Apply to the mouth or throat    [DISCONTINUED] lovastatin (MEVACOR) 20 mg tablet Take 1 tablet (20 mg total) by mouth daily    [DISCONTINUED] triamterene-hydrochlorothiazide (MAXZIDE-25) 37 5-25 mg per tablet Take 1 tablet by mouth daily     No current facility-administered medications on file prior to visit  She has No Known Allergies       Review of Systems   Constitutional: Negative for activity change, appetite change and unexpected weight change  HENT: Negative for ear pain and sore throat  Eyes: Negative for visual disturbance  Respiratory: Negative for cough, shortness of breath and wheezing  Cardiovascular: Negative for chest pain and leg swelling  Gastrointestinal: Negative for abdominal pain, blood in stool, constipation, diarrhea, nausea and vomiting  Genitourinary: Negative for difficulty urinating  Musculoskeletal: Negative for arthralgias and myalgias  Right  Foot  Pain  For  One  Week  Skin: Negative for rash  Neurological: Negative for dizziness, syncope, light-headedness and headaches  Psychiatric/Behavioral: Negative for self-injury, sleep disturbance and suicidal ideas  The patient is not nervous/anxious  Objective:        Physical Exam   Constitutional: She is oriented to person, place, and time  She appears well-developed and well-nourished  No distress  HENT:   Head: Normocephalic and atraumatic  Right Ear: Tympanic membrane, external ear and ear canal normal    Left Ear: Tympanic membrane, external ear and ear canal normal    Mouth/Throat: Oropharynx is clear and moist  No oropharyngeal exudate or posterior oropharyngeal erythema  Eyes: Pupils are equal, round, and reactive to light  Conjunctivae are normal    Neck: Neck supple  Carotid bruit is not present  No thyromegaly present  Cardiovascular: Normal rate and regular rhythm  Exam reveals no gallop and no friction rub  Murmur heard  Systolic murmur is present with a grade of 1/6  Pulmonary/Chest: Effort normal and breath sounds normal  No respiratory distress  She has no wheezes  Abdominal: Soft   Bowel sounds are normal  She exhibits no distension and no mass  There is no tenderness  Musculoskeletal: Normal range of motion  She exhibits tenderness  She exhibits no edema  Right foot skin is clear dorsalis pedis pulse intact  Ankle is nontender with full range of motion  Patient has point tenderness on the heel on the left side  Suspect heel spur  Patient to wear supportive shoes may use inserts  Patient due to stretches and massage  May take over-the-counter Tylenol or Advil if needed   Lymphadenopathy:     She has no cervical adenopathy  Neurological: She is alert and oriented to person, place, and time  Skin: Skin is warm and dry  No rash noted  She is not diaphoretic  No erythema  Psychiatric: She has a normal mood and affect  Her behavior is normal  Judgment and thought content normal    Nursing note and vitals reviewed

## 2019-07-22 NOTE — PROGRESS NOTES
BMI Counseling: Body mass index is 27 88 kg/m²  Discussed the patient's BMI with her  The BMI is above average  BMI counseling and education was provided to the patient  Nutrition recommendations include reducing portion sizes and moderation in carbohydrate intake  Exercise recommendations include exercising 3-5 times per week     Assessment and Plan:     Problem List Items Addressed This Visit        Respiratory    COPD (chronic obstructive pulmonary disease) ([facility] )       Cardiovascular and Mediastinum    Benign essential hypertension       Other    Hyperlipidemia      Other Visit Diagnoses     Medicare annual wellness visit, subsequent    -  Primary         History of Present Illness:     Patient presents for Medicare Annual Wellness visit    Patient Care Team:  Priti Haynes PA-C as PCP - General (Family Medicine)  QUINCY Black MD Lauralee Solid, PA-C Trenton Portal, MD     Problem List:     Patient Active Problem List   Diagnosis    Benign essential hypertension    Cardiac murmur    Hyperlipidemia    Osteopenia    Polyp of sigmoid colon    COPD (chronic obstructive pulmonary disease) ([facility] )    Atypical ductal hyperplasia of breast    Family history of malignant neoplasm of breast    Fibrocystic disease of breast      Past Medical and Surgical History:     Past Medical History:   Diagnosis Date    Multiple benign polyps of large intestine      Past Surgical History:   Procedure Laterality Date    ARTHROSCOPY KNEE Left     INCISIONAL BREAST BIOPSY      SKIN LESION EXCISION      lips benign    VARICOSE VEIN SURGERY      ligation      Family History:     Family History   Problem Relation Age of Onset    Hypertension Mother     Pancreatic cancer Mother     Other Father         MVA with significant injury    Breast cancer Sister     Lung cancer Brother       Social History:     Social History     Tobacco Use   Smoking Status Former Smoker   Smokeless Tobacco Never Used Social History     Substance and Sexual Activity   Alcohol Use No    Comment: social drinker as per [employer]     Social History     Substance and Sexual Activity   Drug Use No      Medications and Allergies:     Current Outpatient Medications   Medication Sig Dispense Refill    albuterol (PROAIR HFA) 90 mcg/act inhaler Inhale 2 puffs every 4 (four) hours as needed for shortness of breath 3 Inhaler 0    Calcium Carb-Cholecalciferol (CALCIUM 1000 + D PO) Take by mouth      coenzyme Q-10 100 MG capsule Take by mouth      lovastatin (MEVACOR) 20 mg tablet Take 1 tablet (20 mg total) by mouth daily 90 tablet 1    Multiple Vitamins-Minerals (MULTI FOR HER) TABS Take by mouth      Omega-3 Fatty Acids (FISH OIL) 645 MG CAPS Take by mouth      tiotropium (SPIRIVA) 18 mcg inhalation capsule Place 1 capsule (18 mcg total) into inhaler and inhale daily 90 capsule 1    triamcinolone (KENALOG) 0 1 % oral topical paste Apply to the mouth or throat      triamterene-hydrochlorothiazide (MAXZIDE-25) 37 5-25 mg per tablet Take 1 tablet by mouth daily 90 tablet 1     No current facility-administered medications for this visit  No Known Allergies   Immunizations:     Immunization History   Administered Date(s) Administered     Influenza (IM) Preservative Free 12/20/2012    INFLUENZA 11/11/2014, 11/11/2015, 11/18/2016, 11/15/2017    Influenza Quadrivalent Preservative Free 3 years and older IM 11/11/2015, 11/18/2016    Influenza Split High Dose Preservative Free IM 10/23/2013, 11/11/2014, 11/15/2017    Influenza, high dose seasonal 0 5 mL 10/10/2018    Pneumococcal Conjugate 13-Valent 11/11/2014    Zoster 06/02/2015      Medicare Screening Tests and Risk Assessments:     Yobany Juarez is here for her Subsequent Wellness visit  Last Medicare Wellness visit information reviewed, patient interviewed and updates made to the record today  Health Risk Assessment:  Patient rates overall health as good   Patient feels that their physical health rating is Same  Eyesight was rated as Same  Hearing was rated as Same  Patient feels that their emotional and mental health rating is Same  Pain experienced by patient in the last 7 days has been None  Patient states that she has experienced no weight loss or gain in last 6 months  Emotional/Mental Health:  Patient has not been feeling nervous/anxious  PHQ-9 Depression Screening:    Frequency of the following problems over the past two weeks:      1  Little interest or pleasure in doing things: 0 - not at all      2  Feeling down, depressed, or hopeless: 0 - not at all  PHQ-2 Score: 0          Broken Bones/Falls: Fall Risk Assessment:    In the past year, patient has experienced: No history of falling in past year          Bladder/Bowel:  Patient has not leaked urine accidently in the last six months  Patient reports no loss of bowel control  Immunizations:  Patient has had a flu vaccination within the last year  Patient has received a pneumonia shot  Patient has received a shingles shot  Patient has received tetanus/diphtheria shot  Home Safety:  Patient does not have trouble with stairs inside or outside of their home  Patient currently reports that there are no safety hazards present in home, working smoke alarms, working carbon monoxide detectors  Preventative Screenings:   Breast cancer screening performed, colon cancer screen completed, cholesterol screen completed, glaucoma eye exam completed,     Nutrition:  Current diet: Regular with servings of the following:    Medications:  Patient is currently taking over-the-counter supplements  Patient is able to manage medications  Lifestyle Choices:  Patient reports no tobacco use  Patient has smoked or used tobacco in the past   Patient has stopped her tobacco use  Tobacco use quit date: patient quit 3 or 4 years ago   Patient reports no alcohol use  Patient drives a vehicle    Patient wears seat belt     Current level of exercise of physical activity described by patient as: somewhat physical          Activities of Daily Living:  Can get out of bed by his or her self, able to dress self, able to make own meals, able to do own shopping, able to bathe self, can do own laundry/housekeeping, can manage own money, pay bills and track expenses    Previous Hospitalizations:  No hospitalization or ED visit in past 12 months        Advanced Directives:  Patient has not decided on power of   Patient has spoken to designated power of   Patient has not completed advanced directive  Preventative Screening/Counseling:      Cardiovascular:      General: Screening Current          Diabetes:      General: Screening Current          Colorectal Cancer:      General: Screening Current          Cervical Cancer:      General: Screening Not Indicated          Osteoporosis:      Counseling: Calcium and Vitamin D Intake and Regular Weightbearing Exercise          AAA:      General: Screening Not Indicated          Glaucoma:      General: Screening Current          HIV:      General: Screening Not Indicated          Advanced Directives:   Patient has no living will for healthcare, does not have durable POA for healthcare, patient does not have an advanced directive       Immunizations:      Influenza: Influenza Recommended Annually      Pneumococcal: Pneumococcal Due Today

## 2019-09-27 DIAGNOSIS — J44.9 CHRONIC OBSTRUCTIVE PULMONARY DISEASE, UNSPECIFIED COPD TYPE (HCC): ICD-10-CM

## 2019-10-07 ENCOUNTER — APPOINTMENT (OUTPATIENT)
Dept: LAB | Facility: CLINIC | Age: 74
End: 2019-10-07
Payer: COMMERCIAL

## 2019-10-07 DIAGNOSIS — I10 BENIGN ESSENTIAL HYPERTENSION: ICD-10-CM

## 2019-10-07 DIAGNOSIS — E78.5 HYPERLIPIDEMIA, UNSPECIFIED HYPERLIPIDEMIA TYPE: ICD-10-CM

## 2019-10-07 LAB
ALBUMIN SERPL BCP-MCNC: 3.6 G/DL (ref 3.5–5)
ALP SERPL-CCNC: 80 U/L (ref 46–116)
ALT SERPL W P-5'-P-CCNC: 23 U/L (ref 12–78)
ANION GAP SERPL CALCULATED.3IONS-SCNC: 3 MMOL/L (ref 4–13)
AST SERPL W P-5'-P-CCNC: 19 U/L (ref 5–45)
BILIRUB SERPL-MCNC: 0.28 MG/DL (ref 0.2–1)
BUN SERPL-MCNC: 21 MG/DL (ref 5–25)
CALCIUM SERPL-MCNC: 9.7 MG/DL (ref 8.3–10.1)
CHLORIDE SERPL-SCNC: 103 MMOL/L (ref 100–108)
CHOLEST SERPL-MCNC: 191 MG/DL (ref 50–200)
CO2 SERPL-SCNC: 31 MMOL/L (ref 21–32)
CREAT SERPL-MCNC: 0.87 MG/DL (ref 0.6–1.3)
GFR SERPL CREATININE-BSD FRML MDRD: 66 ML/MIN/1.73SQ M
GLUCOSE P FAST SERPL-MCNC: 73 MG/DL (ref 65–99)
HDLC SERPL-MCNC: 44 MG/DL (ref 40–60)
LDLC SERPL CALC-MCNC: 127 MG/DL (ref 0–100)
NONHDLC SERPL-MCNC: 147 MG/DL
POTASSIUM SERPL-SCNC: 3.6 MMOL/L (ref 3.5–5.3)
PROT SERPL-MCNC: 7.2 G/DL (ref 6.4–8.2)
SODIUM SERPL-SCNC: 137 MMOL/L (ref 136–145)
TRIGL SERPL-MCNC: 101 MG/DL

## 2019-10-07 PROCEDURE — 80053 COMPREHEN METABOLIC PANEL: CPT

## 2019-10-07 PROCEDURE — 36415 COLL VENOUS BLD VENIPUNCTURE: CPT

## 2019-10-07 PROCEDURE — 80061 LIPID PANEL: CPT

## 2019-10-25 ENCOUNTER — OFFICE VISIT (OUTPATIENT)
Dept: FAMILY MEDICINE CLINIC | Facility: CLINIC | Age: 74
End: 2019-10-25
Payer: COMMERCIAL

## 2019-10-25 VITALS
HEART RATE: 66 BPM | TEMPERATURE: 97.8 F | SYSTOLIC BLOOD PRESSURE: 118 MMHG | BODY MASS INDEX: 27.71 KG/M2 | DIASTOLIC BLOOD PRESSURE: 70 MMHG | OXYGEN SATURATION: 95 % | HEIGHT: 63 IN | RESPIRATION RATE: 16 BRPM | WEIGHT: 156.4 LBS

## 2019-10-25 DIAGNOSIS — I10 BENIGN ESSENTIAL HYPERTENSION: ICD-10-CM

## 2019-10-25 DIAGNOSIS — J44.9 CHRONIC OBSTRUCTIVE PULMONARY DISEASE, UNSPECIFIED COPD TYPE (HCC): Primary | ICD-10-CM

## 2019-10-25 DIAGNOSIS — E78.5 HYPERLIPIDEMIA, UNSPECIFIED HYPERLIPIDEMIA TYPE: ICD-10-CM

## 2019-10-25 PROCEDURE — 3008F BODY MASS INDEX DOCD: CPT | Performed by: PHYSICIAN ASSISTANT

## 2019-10-25 PROCEDURE — 99214 OFFICE O/P EST MOD 30 MIN: CPT | Performed by: PHYSICIAN ASSISTANT

## 2019-10-25 PROCEDURE — 3078F DIAST BP <80 MM HG: CPT | Performed by: PHYSICIAN ASSISTANT

## 2019-10-25 PROCEDURE — 3074F SYST BP LT 130 MM HG: CPT | Performed by: PHYSICIAN ASSISTANT

## 2019-10-25 NOTE — PROGRESS NOTES
Assessment/Plan:     Diagnoses and all orders for this visit:    Chronic obstructive pulmonary disease, unspecified COPD type ([facility] )  Comments:  COPD is stable  Continue Spiriva use ProAir as needed    Benign essential hypertension  Comments:  Blood pressure is at goal continue current regimen  Orders:  -     Comprehensive metabolic panel; Future  -     Lipid panel; Future    Hyperlipidemia, unspecified hyperlipidemia type  Comments:  Lipids at goal continue statin therapy and low-fat diet  Orders:  -     Comprehensive metabolic panel; Future  -     Lipid panel; Future          Subjective:      Patient ID: Donna Washington is a 69 y o  female  Patient presents for follow up chronic conditions  Patient has COPD she is on Spiriva capsules inhaled daily and ProAir HFA as needed  Patient states her breathing is stable  For hypertension she is on generic Maxzide 25 mg  Her blood pressure is at goal   For lipid control she is on statin therapy of lovastatin 20 mg  Takes over-the-counter calcium and vitamin-D supplements for osteopenia  Labs reviewed with patient show normal CMP  Total cholesterol 191 triglycerides 101 HDL 44 and   High-dose flu vaccine out of stock today  Will call patient when shipment arrives    Patient also has the option to get her flu vaccine at the local pharmacy        The following portions of the patient's history were reviewed and updated as appropriate:   She   Patient Active Problem List    Diagnosis Date Noted    Atypical ductal hyperplasia of breast 01/09/2019    Fibrocystic disease of breast 01/09/2019    COPD (chronic obstructive pulmonary disease) ([facility] ) 10/10/2018    Polyp of sigmoid colon 04/16/2018    Osteopenia 11/18/2016    Family history of malignant neoplasm of breast 07/15/2016    Cardiac murmur 02/17/2016    Benign essential hypertension 06/11/2012    Hyperlipidemia 06/11/2012     Current Outpatient Medications   Medication Sig Dispense Refill    albuterol (PROAIR HFA) 90 mcg/act inhaler Inhale 2 puffs every 4 (four) hours as needed for shortness of breath 3 Inhaler 0    Calcium Carb-Cholecalciferol (CALCIUM 1000 + D PO) Take by mouth      coenzyme Q-10 100 MG capsule Take by mouth      lovastatin (MEVACOR) 20 mg tablet Take 1 tablet (20 mg total) by mouth daily 90 tablet 1    Multiple Vitamins-Minerals (MULTI FOR HER) TABS Take by mouth      Omega-3 Fatty Acids (FISH OIL) 645 MG CAPS Take by mouth      tiotropium (SPIRIVA) 18 mcg inhalation capsule Place 1 capsule (18 mcg total) into inhaler and inhale daily 90 capsule 1    triamcinolone (KENALOG) 0 1 % oral topical paste Apply to the mouth or throat      triamterene-hydrochlorothiazide (MAXZIDE-25) 37 5-25 mg per tablet Take 1 tablet by mouth daily 90 tablet 1     No current facility-administered medications for this visit  Current Outpatient Medications on File Prior to Visit   Medication Sig    albuterol (PROAIR HFA) 90 mcg/act inhaler Inhale 2 puffs every 4 (four) hours as needed for shortness of breath    Calcium Carb-Cholecalciferol (CALCIUM 1000 + D PO) Take by mouth    coenzyme Q-10 100 MG capsule Take by mouth    lovastatin (MEVACOR) 20 mg tablet Take 1 tablet (20 mg total) by mouth daily    Multiple Vitamins-Minerals (MULTI FOR HER) TABS Take by mouth    Omega-3 Fatty Acids (FISH OIL) 645 MG CAPS Take by mouth    tiotropium (SPIRIVA) 18 mcg inhalation capsule Place 1 capsule (18 mcg total) into inhaler and inhale daily    triamcinolone (KENALOG) 0 1 % oral topical paste Apply to the mouth or throat    triamterene-hydrochlorothiazide (MAXZIDE-25) 37 5-25 mg per tablet Take 1 tablet by mouth daily     No current facility-administered medications on file prior to visit  She has No Known Allergies       Review of Systems   Constitutional: Negative for unexpected weight change  HENT: Negative for ear pain and sore throat  Eyes: Negative for visual disturbance     Respiratory: Negative for cough, shortness of breath and wheezing  Cardiovascular: Negative for chest pain and leg swelling  Gastrointestinal: Negative for abdominal pain, blood in stool, constipation, diarrhea, nausea and vomiting  Genitourinary: Negative for difficulty urinating  Musculoskeletal: Negative for arthralgias and myalgias  Skin: Negative for rash  Neurological: Negative for dizziness, syncope, light-headedness and headaches  Psychiatric/Behavioral: Negative for self-injury, sleep disturbance and suicidal ideas  The patient is not nervous/anxious  Objective:        Physical Exam   Constitutional: She is oriented to person, place, and time  She appears well-developed and well-nourished  No distress  HENT:   Head: Normocephalic and atraumatic  Right Ear: Tympanic membrane, external ear and ear canal normal    Left Ear: Tympanic membrane, external ear and ear canal normal    Mouth/Throat: Oropharynx is clear and moist  No oropharyngeal exudate or posterior oropharyngeal erythema  Eyes: Pupils are equal, round, and reactive to light  Conjunctivae are normal    Neck: Carotid bruit is not present  No thyromegaly present  Cardiovascular: Normal rate and regular rhythm  Exam reveals no gallop and no friction rub  Murmur heard  Pulmonary/Chest: Effort normal and breath sounds normal  No respiratory distress  She has no wheezes  Abdominal: Soft  Bowel sounds are normal  She exhibits no distension and no mass  There is no tenderness  Musculoskeletal: Normal range of motion  She exhibits no edema  Lymphadenopathy:     She has no cervical adenopathy  Neurological: She is alert and oriented to person, place, and time  Skin: Skin is warm and dry  No rash noted  She is not diaphoretic  No erythema  Psychiatric: She has a normal mood and affect  Her behavior is normal  Judgment and thought content normal    Nursing note and vitals reviewed

## 2019-11-12 ENCOUNTER — IMMUNIZATIONS (OUTPATIENT)
Dept: FAMILY MEDICINE CLINIC | Facility: CLINIC | Age: 74
End: 2019-11-12
Payer: COMMERCIAL

## 2019-11-12 DIAGNOSIS — Z23 ENCOUNTER FOR IMMUNIZATION: Primary | ICD-10-CM

## 2019-11-12 PROCEDURE — G0008 ADMIN INFLUENZA VIRUS VAC: HCPCS

## 2019-11-12 PROCEDURE — 90662 IIV NO PRSV INCREASED AG IM: CPT

## 2020-02-05 DIAGNOSIS — E78.5 HYPERLIPIDEMIA, UNSPECIFIED HYPERLIPIDEMIA TYPE: ICD-10-CM

## 2020-02-05 DIAGNOSIS — I10 ESSENTIAL HYPERTENSION: ICD-10-CM

## 2020-02-05 RX ORDER — TRIAMTERENE AND HYDROCHLOROTHIAZIDE 37.5; 25 MG/1; MG/1
1 TABLET ORAL DAILY
Qty: 90 TABLET | Refills: 1 | Status: SHIPPED | OUTPATIENT
Start: 2020-02-05 | End: 2020-07-28

## 2020-02-05 RX ORDER — LOVASTATIN 20 MG/1
20 TABLET ORAL DAILY
Qty: 90 TABLET | Refills: 1 | Status: SHIPPED | OUTPATIENT
Start: 2020-02-05 | End: 2020-07-28

## 2020-03-06 ENCOUNTER — OFFICE VISIT (OUTPATIENT)
Dept: FAMILY MEDICINE CLINIC | Facility: CLINIC | Age: 75
End: 2020-03-06
Payer: COMMERCIAL

## 2020-03-06 VITALS
HEIGHT: 63 IN | BODY MASS INDEX: 27.29 KG/M2 | OXYGEN SATURATION: 95 % | RESPIRATION RATE: 16 BRPM | SYSTOLIC BLOOD PRESSURE: 140 MMHG | TEMPERATURE: 98.2 F | WEIGHT: 154 LBS | DIASTOLIC BLOOD PRESSURE: 80 MMHG | HEART RATE: 69 BPM

## 2020-03-06 DIAGNOSIS — J44.1 COPD EXACERBATION (HCC): ICD-10-CM

## 2020-03-06 DIAGNOSIS — J06.9 UPPER RESPIRATORY TRACT INFECTION, UNSPECIFIED TYPE: Primary | ICD-10-CM

## 2020-03-06 PROCEDURE — 3077F SYST BP >= 140 MM HG: CPT | Performed by: PHYSICIAN ASSISTANT

## 2020-03-06 PROCEDURE — 99213 OFFICE O/P EST LOW 20 MIN: CPT | Performed by: PHYSICIAN ASSISTANT

## 2020-03-06 PROCEDURE — 1036F TOBACCO NON-USER: CPT | Performed by: PHYSICIAN ASSISTANT

## 2020-03-06 PROCEDURE — 1160F RVW MEDS BY RX/DR IN RCRD: CPT | Performed by: PHYSICIAN ASSISTANT

## 2020-03-06 PROCEDURE — 3008F BODY MASS INDEX DOCD: CPT | Performed by: PHYSICIAN ASSISTANT

## 2020-03-06 PROCEDURE — 3079F DIAST BP 80-89 MM HG: CPT | Performed by: PHYSICIAN ASSISTANT

## 2020-03-06 PROCEDURE — 4040F PNEUMOC VAC/ADMIN/RCVD: CPT | Performed by: PHYSICIAN ASSISTANT

## 2020-03-06 RX ORDER — AZITHROMYCIN 250 MG/1
TABLET, FILM COATED ORAL
Qty: 6 TABLET | Refills: 0 | Status: SHIPPED | OUTPATIENT
Start: 2020-03-06 | End: 2020-03-11

## 2020-03-06 RX ORDER — PREDNISONE 10 MG/1
10 TABLET ORAL 2 TIMES DAILY WITH MEALS
Qty: 10 TABLET | Refills: 0 | Status: SHIPPED | OUTPATIENT
Start: 2020-03-06 | End: 2020-03-26 | Stop reason: SDUPTHER

## 2020-03-06 NOTE — PROGRESS NOTES
Assessment/Plan:     Diagnoses and all orders for this visit:    Upper respiratory tract infection, unspecified type  Comments:  P o  Azithromycin ordered  Patient to rest increase fluids  May take over-the-counter cough medicine  Orders:  -     azithromycin (ZITHROMAX) 250 mg tablet; Take 2 tablets today then 1 tablet daily x 4 days  -     predniSONE 10 mg tablet; Take 1 tablet (10 mg total) by mouth 2 (two) times a day with meals    COPD exacerbation (HCC)  Comments:  P o  Prednisone ordered  Patient will continue her Spiriva and her Ventolin HFA follow up if persists or worsens  Orders:  -     azithromycin (ZITHROMAX) 250 mg tablet; Take 2 tablets today then 1 tablet daily x 4 days  -     predniSONE 10 mg tablet; Take 1 tablet (10 mg total) by mouth 2 (two) times a day with meals          Subjective:      Patient ID: Heydi Van is a 77 y o  female  Patient presents with upper respiratory symptoms which started on Tuesday  Patient states she has a cough which is occasionally productive  Patient states she is coughing so much that her chest is sore  She has no fever  She has no earache  Does have 8 irritated throat  Patient is concerned because she has COPD  Patient uses Spiriva every day    Patient has not used her rescue inhaler ProAir HFA      The following portions of the patient's history were reviewed and updated as appropriate:   She   Patient Active Problem List    Diagnosis Date Noted    Atypical ductal hyperplasia of breast 01/09/2019    Fibrocystic disease of breast 01/09/2019    COPD [facility]chronic obstructive pulmonary disease) ([facility] ) 10/10/2018    Polyp of sigmoid colon 04/16/2018    Osteopenia 11/18/2016    Family history of malignant neoplasm of breast 07/15/2016    Cardiac murmur 02/17/2016    Benign essential hypertension 06/11/2012    Hyperlipidemia 06/11/2012     Current Outpatient Medications   Medication Sig Dispense Refill    albuterol (PROAIR HFA) 90 mcg/act inhaler Inhale 2 puffs every 4 (four) hours as needed for shortness of breath 3 Inhaler 0    Calcium Carb-Cholecalciferol (CALCIUM 1000 + D PO) Take by mouth      coenzyme Q-10 100 MG capsule Take by mouth      lovastatin (MEVACOR) 20 mg tablet Take 1 tablet (20 mg total) by mouth daily 90 tablet 1    Multiple Vitamins-Minerals (MULTI FOR HER) TABS Take by mouth      Omega-3 Fatty Acids (FISH OIL) 645 MG CAPS Take by mouth      tiotropium (SPIRIVA) 18 mcg inhalation capsule Place 1 capsule (18 mcg total) into inhaler and inhale daily 90 capsule 1    triamcinolone (KENALOG) 0 1 % oral topical paste Apply to the mouth or throat      triamterene-hydrochlorothiazide (MAXZIDE-25) 37 5-25 mg per tablet Take 1 tablet by mouth daily 90 tablet 1    azithromycin (ZITHROMAX) 250 mg tablet Take 2 tablets today then 1 tablet daily x 4 days 6 tablet 0    predniSONE 10 mg tablet Take 1 tablet (10 mg total) by mouth 2 (two) times a day with meals 10 tablet 0     No current facility-administered medications for this visit  Current Outpatient Medications on File Prior to Visit   Medication Sig    albuterol (PROAIR HFA) 90 mcg/act inhaler Inhale 2 puffs every 4 (four) hours as needed for shortness of breath    Calcium Carb-Cholecalciferol (CALCIUM 1000 + D PO) Take by mouth    coenzyme Q-10 100 MG capsule Take by mouth    lovastatin (MEVACOR) 20 mg tablet Take 1 tablet (20 mg total) by mouth daily    Multiple Vitamins-Minerals (MULTI FOR HER) TABS Take by mouth    Omega-3 Fatty Acids (FISH OIL) 645 MG CAPS Take by mouth    tiotropium (SPIRIVA) 18 mcg inhalation capsule Place 1 capsule (18 mcg total) into inhaler and inhale daily    triamcinolone (KENALOG) 0 1 % oral topical paste Apply to the mouth or throat    triamterene-hydrochlorothiazide (MAXZIDE-25) 37 5-25 mg per tablet Take 1 tablet by mouth daily     No current facility-administered medications on file prior to visit  She has No Known Allergies       Review of Systems   Constitutional: Negative for activity change, appetite change, chills, fatigue and fever  HENT: Negative for ear pain, postnasal drip, rhinorrhea, sinus pressure, sinus pain and sore throat  Respiratory: Negative for cough  Objective:        Physical Exam   Constitutional: She is oriented to person, place, and time  She appears well-developed and well-nourished  No distress  HENT:   Head: Normocephalic and atraumatic  Right Ear: Tympanic membrane, external ear and ear canal normal    Left Ear: Tympanic membrane, external ear and ear canal normal    Nose: Right sinus exhibits no maxillary sinus tenderness and no frontal sinus tenderness  Left sinus exhibits no maxillary sinus tenderness and no frontal sinus tenderness  Mouth/Throat: No oropharyngeal exudate or posterior oropharyngeal erythema  Eyes: Pupils are equal, round, and reactive to light  Conjunctivae are normal    Neck: Carotid bruit is not present  No thyromegaly present  Cardiovascular: Normal rate, regular rhythm and normal heart sounds  Exam reveals no gallop and no friction rub  No murmur heard  Pulmonary/Chest: Effort normal and breath sounds normal  No respiratory distress  She has no wheezes  Musculoskeletal: She exhibits no edema  Lymphadenopathy:     She has no cervical adenopathy  Neurological: She is alert and oriented to person, place, and time  Skin: Skin is warm and dry  No rash noted  She is not diaphoretic  No erythema  Psychiatric: She has a normal mood and affect  Her behavior is normal  Judgment and thought content normal    Nursing note and vitals reviewed

## 2020-03-26 ENCOUNTER — TELEMEDICINE (OUTPATIENT)
Dept: FAMILY MEDICINE CLINIC | Facility: CLINIC | Age: 75
End: 2020-03-26
Payer: COMMERCIAL

## 2020-03-26 DIAGNOSIS — J44.1 COPD EXACERBATION (HCC): ICD-10-CM

## 2020-03-26 DIAGNOSIS — J44.1 CHRONIC OBSTRUCTIVE PULMONARY DISEASE WITH ACUTE EXACERBATION (HCC): Primary | ICD-10-CM

## 2020-03-26 PROCEDURE — 99213 OFFICE O/P EST LOW 20 MIN: CPT | Performed by: PHYSICIAN ASSISTANT

## 2020-03-26 RX ORDER — PREDNISONE 10 MG/1
10 TABLET ORAL 2 TIMES DAILY WITH MEALS
Qty: 10 TABLET | Refills: 0 | Status: SHIPPED | OUTPATIENT
Start: 2020-03-26 | End: 2020-04-24 | Stop reason: ALTCHOICE

## 2020-03-26 NOTE — PROGRESS NOTES
Virtual Regular Visit    Problem List Items Addressed This Visit        Respiratory    COPD (chronic obstructive pulmonary disease) ([facility] ) - Primary    Relevant Medications    predniSONE 10 mg tablet      Other Visit Diagnoses     COPD exacerbation ([facility] )        P o  Prednisone ordered  Patient will continue her Spiriva and her Ventolin HFA follow up if persists or worsens    Relevant Medications    predniSONE 10 mg tablet               Reason for visit is barking cough    Encounter provider Juan Manuel Tsai PA-C    Provider located at [address] [place] [id number] [phone]      Recent Visits  No visits were found meeting these conditions  Showing recent visits within past 7 days and meeting all other requirements     Today's Visits  Date Type Provider Dept   03/26/20 Telemedicine [facility]   Showing today's visits and meeting all other requirements     Future Appointments  Date Type Provider Dept   03/26/20 Telemedicine [facility]   Showing future appointments within next 150 days and meeting all other requirements        After connecting through [employer], the patient was identified by name and date of birth  Stella Lundberg was informed that this is a telemedicine visit and that the visit is being conducted through telephone which may not be secure and therefore, might not be HIPAA-compliant  My office door was closed  No one else was in the room  She acknowledged consent and understanding of privacy and security of the video platform  The patient has agreed to participate and understands they can discontinue the visit at any time  Subjective  Stella Lundberg is a 77 y o  female patient states she started yesterday with a barking type cough  She has no short of breath no fever no nasal congestion no sore throat  Patient does have COPD  She is on Spiriva daily and ProAir    Patient states she has been using her ProAir little bit more  No body aches she has not been out of the house and no exposure travel to anyone with corona virus  We will start p o  Prednisone she will continue her Spiriva and her ProAir      Past Medical History:   Diagnosis Date    Multiple benign polyps of large intestine        Past Surgical History:   Procedure Laterality Date    ARTHROSCOPY KNEE Left     INCISIONAL BREAST BIOPSY      SKIN LESION EXCISION      lips benign    VARICOSE VEIN SURGERY      ligation       Current Outpatient Medications   Medication Sig Dispense Refill    albuterol (PROAIR HFA) 90 mcg/act inhaler Inhale 2 puffs every 4 (four) hours as needed for shortness of breath 3 Inhaler 0    Calcium Carb-Cholecalciferol (CALCIUM 1000 + D PO) Take by mouth      coenzyme Q-10 100 MG capsule Take by mouth      lovastatin (MEVACOR) 20 mg tablet Take 1 tablet (20 mg total) by mouth daily 90 tablet 1    Multiple Vitamins-Minerals (MULTI FOR HER) TABS Take by mouth      Omega-3 Fatty Acids (FISH OIL) 645 MG CAPS Take by mouth      predniSONE 10 mg tablet Take 1 tablet (10 mg total) by mouth 2 (two) times a day with meals 10 tablet 0    tiotropium (SPIRIVA) 18 mcg inhalation capsule Place 1 capsule (18 mcg total) into inhaler and inhale daily 90 capsule 1    triamcinolone (KENALOG) 0 1 % oral topical paste Apply to the mouth or throat      triamterene-hydrochlorothiazide (MAXZIDE-25) 37 5-25 mg per tablet Take 1 tablet by mouth daily 90 tablet 1     No current facility-administered medications for this visit  No Known Allergies    Review of Systems   Constitutional: Negative for activity change, appetite change, chills, fatigue and fever  HENT: Negative for ear pain, postnasal drip, rhinorrhea, sinus pressure, sinus pain and sore throat  Respiratory: Positive for cough  Negative for shortness of breath and wheezing  I spent 12 minutes with the patient during this visit

## 2020-04-03 DIAGNOSIS — J44.9 CHRONIC OBSTRUCTIVE PULMONARY DISEASE, UNSPECIFIED COPD TYPE (HCC): ICD-10-CM

## 2020-04-10 DIAGNOSIS — J44.9 CHRONIC OBSTRUCTIVE PULMONARY DISEASE, UNSPECIFIED COPD TYPE (HCC): ICD-10-CM

## 2020-04-10 RX ORDER — ALBUTEROL SULFATE 90 UG/1
2 AEROSOL, METERED RESPIRATORY (INHALATION) EVERY 4 HOURS PRN
Qty: 3 INHALER | Refills: 0 | Status: SHIPPED | OUTPATIENT
Start: 2020-04-10 | End: 2021-10-29 | Stop reason: SDUPTHER

## 2020-04-24 ENCOUNTER — TELEMEDICINE (OUTPATIENT)
Dept: FAMILY MEDICINE CLINIC | Facility: CLINIC | Age: 75
End: 2020-04-24
Payer: COMMERCIAL

## 2020-04-24 DIAGNOSIS — I10 BENIGN ESSENTIAL HYPERTENSION: Primary | ICD-10-CM

## 2020-04-24 DIAGNOSIS — M85.80 OSTEOPENIA, UNSPECIFIED LOCATION: ICD-10-CM

## 2020-04-24 DIAGNOSIS — J44.9 CHRONIC OBSTRUCTIVE PULMONARY DISEASE, UNSPECIFIED COPD TYPE (HCC): ICD-10-CM

## 2020-04-24 DIAGNOSIS — E78.5 HYPERLIPIDEMIA, UNSPECIFIED HYPERLIPIDEMIA TYPE: ICD-10-CM

## 2020-04-24 PROCEDURE — 1160F RVW MEDS BY RX/DR IN RCRD: CPT | Performed by: PHYSICIAN ASSISTANT

## 2020-04-24 PROCEDURE — 99214 OFFICE O/P EST MOD 30 MIN: CPT | Performed by: PHYSICIAN ASSISTANT

## 2020-04-28 LAB
ALBUMIN SERPL-MCNC: 4.2 G/DL (ref 3.6–5.1)
ALBUMIN/GLOB SERPL: 1.6 (CALC) (ref 1–2.5)
ALP SERPL-CCNC: 65 U/L (ref 37–153)
ALT SERPL-CCNC: 18 U/L (ref 6–29)
AST SERPL-CCNC: 19 U/L (ref 10–35)
BILIRUB SERPL-MCNC: 0.6 MG/DL (ref 0.2–1.2)
BUN SERPL-MCNC: 21 MG/DL (ref 7–25)
BUN/CREAT SERPL: NORMAL (CALC) (ref 6–22)
CALCIUM SERPL-MCNC: 9.8 MG/DL (ref 8.6–10.4)
CHLORIDE SERPL-SCNC: 102 MMOL/L (ref 98–110)
CHOLEST SERPL-MCNC: 210 MG/DL
CHOLEST/HDLC SERPL: 4.9 (CALC)
CO2 SERPL-SCNC: 30 MMOL/L (ref 20–32)
CREAT SERPL-MCNC: 0.93 MG/DL (ref 0.6–0.93)
GLOBULIN SER CALC-MCNC: 2.6 G/DL (CALC) (ref 1.9–3.7)
GLUCOSE SERPL-MCNC: 86 MG/DL (ref 65–99)
HDLC SERPL-MCNC: 43 MG/DL
LDLC SERPL CALC-MCNC: 141 MG/DL (CALC)
NONHDLC SERPL-MCNC: 167 MG/DL (CALC)
POTASSIUM SERPL-SCNC: 3.9 MMOL/L (ref 3.5–5.3)
PROT SERPL-MCNC: 6.8 G/DL (ref 6.1–8.1)
SL AMB EGFR AFRICAN AMERICAN: 70 ML/MIN/1.73M2
SL AMB EGFR NON AFRICAN AMERICAN: 61 ML/MIN/1.73M2
SODIUM SERPL-SCNC: 141 MMOL/L (ref 135–146)
TRIGL SERPL-MCNC: 137 MG/DL

## 2020-07-28 ENCOUNTER — OFFICE VISIT (OUTPATIENT)
Dept: FAMILY MEDICINE CLINIC | Facility: CLINIC | Age: 75
End: 2020-07-28
Payer: COMMERCIAL

## 2020-07-28 VITALS
HEART RATE: 66 BPM | OXYGEN SATURATION: 96 % | WEIGHT: 149.6 LBS | DIASTOLIC BLOOD PRESSURE: 80 MMHG | BODY MASS INDEX: 26.51 KG/M2 | RESPIRATION RATE: 16 BRPM | TEMPERATURE: 98.1 F | HEIGHT: 63 IN | SYSTOLIC BLOOD PRESSURE: 140 MMHG

## 2020-07-28 DIAGNOSIS — J44.9 CHRONIC OBSTRUCTIVE PULMONARY DISEASE, UNSPECIFIED COPD TYPE (HCC): ICD-10-CM

## 2020-07-28 DIAGNOSIS — E78.5 HYPERLIPIDEMIA, UNSPECIFIED HYPERLIPIDEMIA TYPE: ICD-10-CM

## 2020-07-28 DIAGNOSIS — I10 BENIGN ESSENTIAL HYPERTENSION: ICD-10-CM

## 2020-07-28 DIAGNOSIS — I10 ESSENTIAL HYPERTENSION: ICD-10-CM

## 2020-07-28 DIAGNOSIS — K63.5 POLYP OF SIGMOID COLON, UNSPECIFIED TYPE: ICD-10-CM

## 2020-07-28 DIAGNOSIS — Z00.00 MEDICARE ANNUAL WELLNESS VISIT, SUBSEQUENT: Primary | ICD-10-CM

## 2020-07-28 PROCEDURE — 4040F PNEUMOC VAC/ADMIN/RCVD: CPT | Performed by: PHYSICIAN ASSISTANT

## 2020-07-28 PROCEDURE — 3008F BODY MASS INDEX DOCD: CPT | Performed by: PHYSICIAN ASSISTANT

## 2020-07-28 PROCEDURE — 1160F RVW MEDS BY RX/DR IN RCRD: CPT | Performed by: PHYSICIAN ASSISTANT

## 2020-07-28 PROCEDURE — 1125F AMNT PAIN NOTED PAIN PRSNT: CPT | Performed by: PHYSICIAN ASSISTANT

## 2020-07-28 PROCEDURE — 1036F TOBACCO NON-USER: CPT | Performed by: PHYSICIAN ASSISTANT

## 2020-07-28 PROCEDURE — 1170F FXNL STATUS ASSESSED: CPT | Performed by: PHYSICIAN ASSISTANT

## 2020-07-28 PROCEDURE — 3079F DIAST BP 80-89 MM HG: CPT | Performed by: PHYSICIAN ASSISTANT

## 2020-07-28 PROCEDURE — 99214 OFFICE O/P EST MOD 30 MIN: CPT | Performed by: PHYSICIAN ASSISTANT

## 2020-07-28 PROCEDURE — 3077F SYST BP >= 140 MM HG: CPT | Performed by: PHYSICIAN ASSISTANT

## 2020-07-28 PROCEDURE — G0439 PPPS, SUBSEQ VISIT: HCPCS | Performed by: PHYSICIAN ASSISTANT

## 2020-07-28 RX ORDER — TRIAMTERENE AND HYDROCHLOROTHIAZIDE 37.5; 25 MG/1; MG/1
1 TABLET ORAL DAILY
Qty: 90 TABLET | Refills: 1 | Status: SHIPPED | OUTPATIENT
Start: 2020-07-28 | End: 2021-01-27 | Stop reason: SDUPTHER

## 2020-07-28 RX ORDER — LOVASTATIN 20 MG/1
20 TABLET ORAL DAILY
Qty: 90 TABLET | Refills: 1 | Status: SHIPPED | OUTPATIENT
Start: 2020-07-28 | End: 2021-01-27 | Stop reason: SDUPTHER

## 2020-07-28 NOTE — PROGRESS NOTES
Assessment and Plan:     Problem List Items Addressed This Visit        Respiratory    COPD (chronic obstructive pulmonary disease) ([facility] [address] )       Cardiovascular and Mediastinum    Benign essential hypertension       Other    Hyperlipidemia      Other Visit Diagnoses     Medicare annual wellness visit, subsequent    -  Primary           Preventive health issues were discussed with patient, and age appropriate screening tests were ordered as noted in patient's After Visit Summary  Personalized health advice and appropriate referrals for health education or preventive services given if needed, as noted in patient's After Visit Summary  History of Present Illness:     Patient presents for Medicare Annual Wellness visit    Patient Care Team:  Haritha Monahan PA-C as PCP - General (Family Medicine)  QUINCY Talbert MD Alma Sewer, PA-C Charlaine Shelter, MD     Problem List:     Patient Active Problem List   Diagnosis    Benign essential hypertension    Cardiac murmur    Hyperlipidemia    Osteopenia    Polyp of sigmoid colon    [facility]    Atypical ductal hyperplasia of breast    Family history of malignant neoplasm of breast    Fibrocystic disease of breast      Past Medical and Surgical History:     Past Medical History:   Diagnosis Date    Multiple benign polyps of large intestine      Past Surgical History:   Procedure Laterality Date    ARTHROSCOPY KNEE Left     INCISIONAL BREAST BIOPSY      SKIN LESION EXCISION      lips benign    VARICOSE VEIN SURGERY      ligation      Family History:     Family History   Problem Relation Age of Onset    Hypertension Mother     Pancreatic cancer Mother     Other Father         MVA with significant injury    Breast cancer Sister     Lung cancer Brother       Social History:        Social History     Socioeconomic History    Marital status:       Spouse name: Not on file    Number of children: Not on file    Years of education: Not on file    Highest education level: Not on file   Occupational History    Not on file   Social Needs    Financial resource strain: Not on file    Food insecurity:     Worry: Not on file     Inability: Not on file    Transportation needs:     Medical: Not on file     Non-medical: Not on file   Tobacco Use    Smoking status: Former Smoker    Smokeless tobacco: Never Used   Substance and Sexual Activity    Alcohol use: No     Comment: social drinker as per [employer]    Drug use: No    Sexual activity: Not on file   Lifestyle    Physical activity:     Days per week: Not on file     Minutes per session: Not on file    Stress: Not on file   Relationships    Social connections:     Talks on phone: Not on file     Gets together: Not on file     Attends Caodaism service: Not on file     Active member of club or organization: Not on file     Attends meetings of clubs or organizations: Not on file     Relationship status: Not on file    Intimate partner violence:     Fear of current or ex partner: Not on file     Emotionally abused: Not on file     Physically abused: Not on file     Forced sexual activity: Not on file   Other Topics Concern    Not on file   Social History Narrative    Not on file      Medications and Allergies:     Current Outpatient Medications   Medication Sig Dispense Refill    albuterol (ProAir HFA) 90 mcg/act inhaler Inhale 2 puffs every 4 (four) hours as needed for shortness of breath 3 Inhaler 0    Calcium Carb-Cholecalciferol (CALCIUM 1000 + D PO) Take by mouth      coenzyme Q-10 100 MG capsule Take by mouth      lovastatin (MEVACOR) 20 mg tablet Take 1 tablet (20 mg total) by mouth daily 90 tablet 1    Multiple Vitamins-Minerals (MULTI FOR HER) TABS Take by mouth      Omega-3 Fatty Acids (FISH OIL) 645 MG CAPS Take by mouth      tiotropium (SPIRIVA) 18 mcg inhalation capsule Place 1 capsule (18 mcg total) into inhaler and inhale daily 90 capsule 1    triamcinolone (KENALOG) 0 1 % oral topical paste Apply to the mouth or throat      triamterene-hydrochlorothiazide (MAXZIDE-25) 37 5-25 mg per tablet Take 1 tablet by mouth daily 90 tablet 1     No current facility-administered medications for this visit  No Known Allergies   Immunizations:     Immunization History   Administered Date(s) Administered     Influenza (IM) Preservative Free 12/20/2012    INFLUENZA 11/11/2014, 11/11/2015, 11/18/2016, 11/15/2017    Influenza Quadrivalent Preservative Free 3 years and older IM 11/11/2015, 11/18/2016    Influenza Split High Dose Preservative Free IM 10/23/2013, 11/11/2014, 11/15/2017    Influenza, high dose seasonal 0 5 mL 10/10/2018, 11/12/2019    Pneumococcal Conjugate 13-Valent 11/11/2014    Zoster 06/02/2015      Health Maintenance:         Topic Date Due    Hepatitis C Screening  1945    MAMMOGRAM  07/11/2018    CRC Screening: Colonoscopy  05/09/2020    DXA SCAN  11/21/2020         Topic Date Due    DTaP,Tdap,and Td Vaccines (1 - Tdap) 02/10/1956    Pneumococcal Vaccine: 65+ Years (2 of 2 - PPSV23) 11/11/2015    Influenza Vaccine  07/01/2020      Medicare Health Risk Assessment:     There were no vitals taken for this visit  Ashely Miller is here for her Subsequent Wellness visit  Last Medicare Wellness visit information reviewed, patient interviewed and updates made to the record today  Health Risk Assessment:   Patient rates overall health as good  Patient feels that their physical health rating is same  Eyesight was rated as same  Hearing was rated as same  Patient feels that their emotional and mental health rating is same  Pain experienced in the last 7 days has been none  Patient states that she has experienced no weight loss or gain in last 6 months  Depression Screening:   PHQ-2 Score: 0      Fall Risk Screening:    In the past year, patient has experienced: no history of falling in past year Urinary Incontinence Screening:   Patient has not leaked urine accidently in the last six months  Home Safety:  Patient does not have trouble with stairs inside or outside of their home  Patient has working smoke alarms and has working carbon monoxide detector  Home safety hazards include: none  Nutrition:   Current diet is Regular  Medications:   Patient is currently taking over-the-counter supplements  OTC medications include: see medication list  Patient is able to manage medications  Activities of Daily Living (ADLs)/Instrumental Activities of Daily Living (IADLs):   Walk and transfer into and out of bed and chair?: Yes  Dress and groom yourself?: Yes    Bathe or shower yourself?: Yes    Feed yourself?  Yes  Do your laundry/housekeeping?: Yes  Manage your money, pay your bills and track your expenses?: Yes  Make your own meals?: Yes    Do your own shopping?: Yes    Previous Hospitalizations:   Any hospitalizations or ED visits within the last 12 months?: No      Advance Care Planning:   Living will: No    Durable POA for healthcare: No    Advanced directive: No    Five wishes given: Yes      Cognitive Screening:   Provider or family/friend/caregiver concerned regarding cognition?: No    PREVENTIVE SCREENINGS      Cardiovascular Screening:    General: History Lipid Disorder and Screening Current      Diabetes Screening:     General: Screening Current      Colorectal Cancer Screening:       Due for: Colonoscopy - Low Risk      Breast Cancer Screening:       Due for: Mammogram        Cervical Cancer Screening:    General: Screening Not Indicated      Osteoporosis Screening:    General: Screening Current      Lung Cancer Screening:     General: Screening Not Indicated      Kaushal Saldivar PA-C

## 2020-07-28 NOTE — PROGRESS NOTES
Assessment/Plan:     Diagnoses and all orders for this visit:    Medicare annual wellness visit, subsequent    Benign essential hypertension  Comments:  Blood pressure is at goal continue current regimen  Orders:  -     Comprehensive metabolic panel; Future    Hyperlipidemia, unspecified hyperlipidemia type  Comments:  Continue statin therapy low-fat diet and exercise  Orders:  -     Lipid panel; Future  -     lovastatin (MEVACOR) 20 mg tablet; Take 1 tablet (20 mg total) by mouth daily    Chronic obstructive pulmonary disease, unspecified COPD type ([facility] [address] )  Comments:  COPD is stable continue Spiriva daily and ProAir as needed    Polyp of sigmoid colon, unspecified type  Comments:  Referred to Gastroenterology for repeat colonoscopy  Orders:  -     Ambulatory referral to Gastroenterology; Future    Essential hypertension  -     triamterene-hydrochlorothiazide (MAXZIDE-25) 37 5-25 mg per tablet; Take 1 tablet by mouth daily          Subjective:      Patient ID: Lela Gonsalez is a 77 y o  female  Patient presents in the office for follow up chronic conditions  She has hypertension was is well controlled  She also has hyperlipidemia for which she takes statin therapy  Patient also has COPD she uses her Spiriva daily and uses her rescue inhaler only when needed  Her breathing has been stable    Patient doing well feeling well overall      The following portions of the patient's history were reviewed and updated as appropriate:   She   Patient Active Problem List    Diagnosis Date Noted    Atypical ductal hyperplasia of breast 01/09/2019    Fibrocystic disease of breast 01/09/2019    COPD (chronic obstructive [facility] disease) ([facility] ) 10/10/2018    Polyp of sigmoid colon 04/16/2018    Osteopenia 11/18/2016    Family history of malignant neoplasm of breast 07/15/2016    Cardiac murmur 02/17/2016    Benign essential hypertension 06/11/2012    Hyperlipidemia 06/11/2012     Current Outpatient Medications Medication Sig Dispense Refill    albuterol (ProAir HFA) 90 mcg/act inhaler Inhale 2 puffs every 4 (four) hours as needed for shortness of breath 3 Inhaler 0    Calcium Carb-Cholecalciferol (CALCIUM 1000 + D PO) Take by mouth      coenzyme Q-10 100 MG capsule Take by mouth      lovastatin (MEVACOR) 20 mg tablet Take 1 tablet (20 mg total) by mouth daily 90 tablet 1    Multiple Vitamins-Minerals (MULTI FOR HER) TABS Take by mouth      Omega-3 Fatty Acids (FISH OIL) 645 MG CAPS Take by mouth      tiotropium (SPIRIVA) 18 mcg inhalation capsule Place 1 capsule (18 mcg total) into inhaler and inhale daily 90 capsule 1    triamcinolone (KENALOG) 0 1 % oral topical paste Apply to the mouth or throat      triamterene-hydrochlorothiazide (MAXZIDE-25) 37 5-25 mg per tablet Take 1 tablet by mouth daily 90 tablet 1     No current facility-administered medications for this visit  Current Outpatient Medications on File Prior to Visit   Medication Sig    albuterol (ProAir HFA) 90 mcg/act inhaler Inhale 2 puffs every 4 (four) hours as needed for shortness of breath    Calcium Carb-Cholecalciferol (CALCIUM 1000 + D PO) Take by mouth    coenzyme Q-10 100 MG capsule Take by mouth    Multiple Vitamins-Minerals (MULTI FOR HER) TABS Take by mouth    Omega-3 Fatty Acids (FISH OIL) 645 MG CAPS Take by mouth    tiotropium (SPIRIVA) 18 mcg inhalation capsule Place 1 capsule (18 mcg total) into inhaler and inhale daily    triamcinolone (KENALOG) 0 1 % oral topical paste Apply to the mouth or throat     No current facility-administered medications on file prior to visit  She has No Known Allergies       Review of Systems   Constitutional: Negative for activity change and unexpected weight change  HENT: Negative for ear pain and sore throat  Eyes: Negative for visual disturbance  Respiratory: Negative for cough, shortness of breath and wheezing  Cardiovascular: Negative for chest pain and leg swelling  Gastrointestinal: Negative for abdominal pain, blood in stool, constipation, diarrhea, nausea and vomiting  Genitourinary: Negative for difficulty urinating  Musculoskeletal: Negative for arthralgias and myalgias  Skin: Negative for rash  Neurological: Negative for dizziness, syncope, light-headedness and headaches  Psychiatric/Behavioral: Negative for self-injury, sleep disturbance and suicidal ideas  The patient is not nervous/anxious  Objective:        Physical Exam   Constitutional: She is oriented to person, place, and time  She appears well-developed and well-nourished  No distress  HENT:   Head: Normocephalic and atraumatic  Right Ear: Tympanic membrane, external ear and ear canal normal    Left Ear: Tympanic membrane, external ear and ear canal normal    Mouth/Throat: Oropharynx is clear and moist  No oropharyngeal exudate or posterior oropharyngeal erythema  Eyes: Pupils are equal, round, and reactive to light  Conjunctivae are normal    Neck: Carotid bruit is not present  No thyromegaly present  Cardiovascular: Normal rate, regular rhythm and normal heart sounds  Exam reveals no gallop and no friction rub  No murmur heard  Pulmonary/Chest: Effort normal and breath sounds normal  No respiratory distress  She has no wheezes  Abdominal: Soft  Bowel sounds are normal  She exhibits no distension and no mass  There is no tenderness  Musculoskeletal: She exhibits no edema  Lymphadenopathy:     She has no cervical adenopathy  Neurological: She is alert and oriented to person, place, and time  Skin: Skin is warm and dry  No rash noted  She is not diaphoretic  No erythema  Psychiatric: She has a normal mood and affect  Her behavior is normal  Judgment and thought content normal    Nursing note and vitals reviewed

## 2020-10-15 ENCOUNTER — TELEPHONE (OUTPATIENT)
Dept: ADMINISTRATIVE | Facility: OTHER | Age: 75
End: 2020-10-15

## 2020-10-16 LAB
ALBUMIN SERPL-MCNC: 4 G/DL (ref 3.6–5.1)
ALBUMIN/GLOB SERPL: 1.4 (CALC) (ref 1–2.5)
ALP SERPL-CCNC: 63 U/L (ref 37–153)
ALT SERPL-CCNC: 16 U/L (ref 6–29)
AST SERPL-CCNC: 19 U/L (ref 10–35)
BILIRUB SERPL-MCNC: 0.6 MG/DL (ref 0.2–1.2)
BUN SERPL-MCNC: 19 MG/DL (ref 7–25)
BUN/CREAT SERPL: NORMAL (CALC) (ref 6–22)
CALCIUM SERPL-MCNC: 9.7 MG/DL (ref 8.6–10.4)
CHLORIDE SERPL-SCNC: 103 MMOL/L (ref 98–110)
CHOLEST SERPL-MCNC: 188 MG/DL
CHOLEST/HDLC SERPL: 4.6 (CALC)
CO2 SERPL-SCNC: 32 MMOL/L (ref 20–32)
CREAT SERPL-MCNC: 0.92 MG/DL (ref 0.6–0.93)
GLOBULIN SER CALC-MCNC: 2.9 G/DL (CALC) (ref 1.9–3.7)
GLUCOSE SERPL-MCNC: 90 MG/DL (ref 65–99)
HDLC SERPL-MCNC: 41 MG/DL
LDLC SERPL CALC-MCNC: 119 MG/DL (CALC)
NONHDLC SERPL-MCNC: 147 MG/DL (CALC)
POTASSIUM SERPL-SCNC: 4.2 MMOL/L (ref 3.5–5.3)
PROT SERPL-MCNC: 6.9 G/DL (ref 6.1–8.1)
SL AMB EGFR AFRICAN AMERICAN: 71 ML/MIN/1.73M2
SL AMB EGFR NON AFRICAN AMERICAN: 61 ML/MIN/1.73M2
SODIUM SERPL-SCNC: 142 MMOL/L (ref 135–146)
TRIGL SERPL-MCNC: 161 MG/DL

## 2020-10-20 DIAGNOSIS — J44.9 CHRONIC OBSTRUCTIVE PULMONARY DISEASE, UNSPECIFIED COPD TYPE (HCC): ICD-10-CM

## 2020-10-29 ENCOUNTER — OFFICE VISIT (OUTPATIENT)
Dept: FAMILY MEDICINE CLINIC | Facility: CLINIC | Age: 75
End: 2020-10-29
Payer: COMMERCIAL

## 2020-10-29 VITALS
HEIGHT: 63 IN | RESPIRATION RATE: 18 BRPM | BODY MASS INDEX: 26.22 KG/M2 | DIASTOLIC BLOOD PRESSURE: 80 MMHG | SYSTOLIC BLOOD PRESSURE: 148 MMHG | HEART RATE: 67 BPM | TEMPERATURE: 97.5 F | OXYGEN SATURATION: 94 % | WEIGHT: 148 LBS

## 2020-10-29 DIAGNOSIS — Z23 ENCOUNTER FOR IMMUNIZATION: ICD-10-CM

## 2020-10-29 DIAGNOSIS — J44.9 CHRONIC OBSTRUCTIVE PULMONARY DISEASE, UNSPECIFIED COPD TYPE (HCC): ICD-10-CM

## 2020-10-29 DIAGNOSIS — I10 BENIGN ESSENTIAL HYPERTENSION: Primary | ICD-10-CM

## 2020-10-29 DIAGNOSIS — E78.5 HYPERLIPIDEMIA, UNSPECIFIED HYPERLIPIDEMIA TYPE: ICD-10-CM

## 2020-10-29 PROCEDURE — 90662 IIV NO PRSV INCREASED AG IM: CPT | Performed by: PHYSICIAN ASSISTANT

## 2020-10-29 PROCEDURE — 99214 OFFICE O/P EST MOD 30 MIN: CPT | Performed by: PHYSICIAN ASSISTANT

## 2020-10-29 PROCEDURE — G0009 ADMIN PNEUMOCOCCAL VACCINE: HCPCS | Performed by: PHYSICIAN ASSISTANT

## 2020-10-29 PROCEDURE — 4040F PNEUMOC VAC/ADMIN/RCVD: CPT | Performed by: PHYSICIAN ASSISTANT

## 2020-10-29 PROCEDURE — 90732 PPSV23 VACC 2 YRS+ SUBQ/IM: CPT | Performed by: PHYSICIAN ASSISTANT

## 2020-10-29 PROCEDURE — 3077F SYST BP >= 140 MM HG: CPT | Performed by: PHYSICIAN ASSISTANT

## 2020-10-29 PROCEDURE — 1160F RVW MEDS BY RX/DR IN RCRD: CPT | Performed by: PHYSICIAN ASSISTANT

## 2020-10-29 PROCEDURE — G0008 ADMIN INFLUENZA VIRUS VAC: HCPCS | Performed by: PHYSICIAN ASSISTANT

## 2020-10-29 PROCEDURE — 3008F BODY MASS INDEX DOCD: CPT | Performed by: PHYSICIAN ASSISTANT

## 2020-10-29 PROCEDURE — 3079F DIAST BP 80-89 MM HG: CPT | Performed by: PHYSICIAN ASSISTANT

## 2020-11-02 ENCOUNTER — TELEPHONE (OUTPATIENT)
Dept: ADMINISTRATIVE | Facility: OTHER | Age: 75
End: 2020-11-02

## 2021-01-27 ENCOUNTER — VBI (OUTPATIENT)
Dept: ADMINISTRATIVE | Facility: OTHER | Age: 76
End: 2021-01-27

## 2021-01-27 DIAGNOSIS — E78.5 HYPERLIPIDEMIA, UNSPECIFIED HYPERLIPIDEMIA TYPE: ICD-10-CM

## 2021-01-27 DIAGNOSIS — I10 ESSENTIAL HYPERTENSION: ICD-10-CM

## 2021-01-27 RX ORDER — TRIAMTERENE AND HYDROCHLOROTHIAZIDE 37.5; 25 MG/1; MG/1
1 TABLET ORAL DAILY
Qty: 90 TABLET | Refills: 1 | Status: SHIPPED | OUTPATIENT
Start: 2021-01-27 | End: 2021-08-09 | Stop reason: SDUPTHER

## 2021-01-27 RX ORDER — LOVASTATIN 20 MG/1
20 TABLET ORAL DAILY
Qty: 90 TABLET | Refills: 1 | Status: SHIPPED | OUTPATIENT
Start: 2021-01-27 | End: 2021-08-09 | Stop reason: SDUPTHER

## 2021-02-11 DIAGNOSIS — Z23 ENCOUNTER FOR IMMUNIZATION: ICD-10-CM

## 2021-02-15 ENCOUNTER — IMMUNIZATIONS (OUTPATIENT)
Dept: FAMILY MEDICINE CLINIC | Facility: HOSPITAL | Age: 76
End: 2021-02-15

## 2021-02-15 DIAGNOSIS — Z23 ENCOUNTER FOR IMMUNIZATION: Primary | ICD-10-CM

## 2021-02-15 PROCEDURE — 91300 SARS-COV-2 / COVID-19 MRNA VACCINE (PFIZER-BIONTECH) 30 MCG: CPT

## 2021-02-15 PROCEDURE — 0001A SARS-COV-2 / COVID-19 MRNA VACCINE (PFIZER-BIONTECH) 30 MCG: CPT

## 2021-03-06 ENCOUNTER — IMMUNIZATIONS (OUTPATIENT)
Dept: FAMILY MEDICINE CLINIC | Facility: HOSPITAL | Age: 76
End: 2021-03-06

## 2021-03-06 DIAGNOSIS — Z23 ENCOUNTER FOR IMMUNIZATION: Primary | ICD-10-CM

## 2021-03-06 PROCEDURE — 0002A SARS-COV-2 / COVID-19 MRNA VACCINE (PFIZER-BIONTECH) 30 MCG: CPT

## 2021-03-06 PROCEDURE — 91300 SARS-COV-2 / COVID-19 MRNA VACCINE (PFIZER-BIONTECH) 30 MCG: CPT

## 2021-04-14 LAB
ALBUMIN SERPL-MCNC: 4.1 G/DL (ref 3.6–5.1)
ALBUMIN/GLOB SERPL: 1.9 (CALC) (ref 1–2.5)
ALP SERPL-CCNC: 65 U/L (ref 37–153)
ALT SERPL-CCNC: 18 U/L (ref 6–29)
AST SERPL-CCNC: 19 U/L (ref 10–35)
BILIRUB SERPL-MCNC: 0.6 MG/DL (ref 0.2–1.2)
BUN SERPL-MCNC: 19 MG/DL (ref 7–25)
BUN/CREAT SERPL: ABNORMAL (CALC) (ref 6–22)
CALCIUM SERPL-MCNC: 9.5 MG/DL (ref 8.6–10.4)
CHLORIDE SERPL-SCNC: 104 MMOL/L (ref 98–110)
CHOLEST SERPL-MCNC: 194 MG/DL
CHOLEST/HDLC SERPL: 4.5 (CALC)
CO2 SERPL-SCNC: 33 MMOL/L (ref 20–32)
CREAT SERPL-MCNC: 0.81 MG/DL (ref 0.6–0.93)
GLOBULIN SER CALC-MCNC: 2.2 G/DL (CALC) (ref 1.9–3.7)
GLUCOSE SERPL-MCNC: 89 MG/DL (ref 65–99)
HDLC SERPL-MCNC: 43 MG/DL
LDLC SERPL CALC-MCNC: 128 MG/DL (CALC)
NONHDLC SERPL-MCNC: 151 MG/DL (CALC)
POTASSIUM SERPL-SCNC: 3.7 MMOL/L (ref 3.5–5.3)
PROT SERPL-MCNC: 6.3 G/DL (ref 6.1–8.1)
SL AMB EGFR AFRICAN AMERICAN: 82 ML/MIN/1.73M2
SL AMB EGFR NON AFRICAN AMERICAN: 71 ML/MIN/1.73M2
SODIUM SERPL-SCNC: 142 MMOL/L (ref 135–146)
TRIGL SERPL-MCNC: 123 MG/DL

## 2021-04-22 ENCOUNTER — RA CDI HCC (OUTPATIENT)
Dept: OTHER | Facility: HOSPITAL | Age: 76
End: 2021-04-22

## 2021-04-22 NOTE — PROGRESS NOTES
Based on clinical documentation indicated in your record, it appears that the patient may have the following conditions not coded in 2021:    J44 9 COPD    If this is correct, please document and assess at your next visit April 29th    [place] [id number]  coding opportunities             Chart reviewed, (number of) suggestions sent to provider: 1           Patients insurance company: [facility] (Medicare Advantage and Commercial)             [place] [address]  coding opportunities             Chart reviewed, (number of) suggestions sent to provider: 1     Problem listed updated   Provider Accepted, (number of) suggestions accepted: 1        Patients insurance company: [facility] (Medicare Advantage and Commercial)

## 2021-04-29 ENCOUNTER — OFFICE VISIT (OUTPATIENT)
Dept: FAMILY MEDICINE CLINIC | Facility: CLINIC | Age: 76
End: 2021-04-29
Payer: COMMERCIAL

## 2021-04-29 VITALS
HEART RATE: 68 BPM | SYSTOLIC BLOOD PRESSURE: 118 MMHG | OXYGEN SATURATION: 97 % | WEIGHT: 152 LBS | TEMPERATURE: 97.3 F | HEIGHT: 63 IN | BODY MASS INDEX: 26.93 KG/M2 | DIASTOLIC BLOOD PRESSURE: 68 MMHG | RESPIRATION RATE: 18 BRPM

## 2021-04-29 DIAGNOSIS — J44.9 CHRONIC OBSTRUCTIVE PULMONARY DISEASE, UNSPECIFIED COPD TYPE (HCC): ICD-10-CM

## 2021-04-29 DIAGNOSIS — E78.5 HYPERLIPIDEMIA, UNSPECIFIED HYPERLIPIDEMIA TYPE: ICD-10-CM

## 2021-04-29 DIAGNOSIS — I10 BENIGN ESSENTIAL HYPERTENSION: Primary | ICD-10-CM

## 2021-04-29 PROCEDURE — 3725F SCREEN DEPRESSION PERFORMED: CPT | Performed by: PHYSICIAN ASSISTANT

## 2021-04-29 PROCEDURE — 3008F BODY MASS INDEX DOCD: CPT | Performed by: PHYSICIAN ASSISTANT

## 2021-04-29 PROCEDURE — 99214 OFFICE O/P EST MOD 30 MIN: CPT | Performed by: PHYSICIAN ASSISTANT

## 2021-04-29 PROCEDURE — 3078F DIAST BP <80 MM HG: CPT | Performed by: PHYSICIAN ASSISTANT

## 2021-04-29 PROCEDURE — 3074F SYST BP LT 130 MM HG: CPT | Performed by: PHYSICIAN ASSISTANT

## 2021-04-29 PROCEDURE — 1160F RVW MEDS BY RX/DR IN RCRD: CPT | Performed by: PHYSICIAN ASSISTANT

## 2021-04-29 PROCEDURE — 1036F TOBACCO NON-USER: CPT | Performed by: PHYSICIAN ASSISTANT

## 2021-04-29 NOTE — PROGRESS NOTES
BMI Counseling: Body mass index is 26 93 kg/m²  The BMI is above normal  Nutrition recommendations include decreasing portion sizes and moderation in carbohydrate intake  Exercise recommendations include exercising 3-5 times per week  Assessment/Plan:     Diagnoses and all orders for this visit:    Benign essential hypertension  Comments:  Blood pressure is at goal continue current regimen  Orders:  -     Comprehensive metabolic panel; Future  -     Lipid panel; Future    Hyperlipidemia, unspecified hyperlipidemia type  Comments:  Lipids are at goal continue statin therapy and low-fat diet  Orders:  -     Comprehensive metabolic panel; Future  -     Lipid panel; Future    Chronic [facility] disease[facility]  Comments:  Stable continue current regimen          Subjective:      Patient ID: Clint Sánchez is a 77 y o  female  Patient presents in the office for follow up chronic conditions  Patient has hypertension she is on max I have with good blood pressure control  Her lipids are controlled with lovastatin low-fat diet  Lipids are acceptable  Hepatic functions are normal   Patient has mild COPD she uses Spiriva daily and ProAir as needed  Her breathing is currently stable  She has osteopenia she takes calcium and vitamin-D  Weight-bearing exercises suggestion  Patient had her COVID vaccines        The following portions of the patient's history were reviewed and updated as appropriate:   She   Patient Active Problem List    Diagnosis Date Noted    Atypical ductal hyperplasia of breast 01/09/2019    Fibrocystic disease of breast 01/09/2019    COPD [facility]ch[facility] obstructive [facility] disease) ([facility] ) 10/10/2018    Polyp of sigmoid colon 04/16/2018    Osteopenia 11/18/2016    Family history of malignant neoplasm of breast 07/15/2016    Cardiac murmur 02/17/2016    Benign essential hypertension 06/11/2012    Hyperlipidemia 06/11/2012     Current Outpatient Medications Medication Sig Dispense Refill    albuterol (ProAir HFA) 90 mcg/act inhaler Inhale 2 puffs every 4 (four) hours as needed for shortness of breath 3 Inhaler 0    Calcium Carb-Cholecalciferol (CALCIUM 1000 + D PO) Take by mouth      coenzyme Q-10 100 MG capsule Take by mouth      lovastatin (MEVACOR) 20 mg tablet Take 1 tablet (20 mg total) by mouth daily 90 tablet 1    Multiple Vitamins-Minerals (MULTI FOR HER) TABS Take by mouth      Omega-3 Fatty Acids (FISH OIL) 645 MG CAPS Take by mouth      tiotropium (SPIRIVA) 18 mcg inhalation capsule Place 1 capsule (18 mcg total) into inhaler and inhale daily 90 capsule 1    triamcinolone (KENALOG) 0 1 % oral topical paste Apply to the mouth or throat      triamterene-hydrochlorothiazide (MAXZIDE-25) 37 5-25 mg per tablet Take 1 tablet by mouth daily 90 tablet 1     No current facility-administered medications for this visit  She has No Known Allergies       Review of Systems   Constitutional: Negative for activity change and unexpected weight change  HENT: Negative for ear pain and sore throat  Eyes: Negative for visual disturbance  Respiratory: Negative for cough, shortness of breath and wheezing  Cardiovascular: Negative for chest pain and leg swelling  Gastrointestinal: Negative for abdominal pain, blood in stool, constipation, diarrhea, nausea and vomiting  Genitourinary: Negative for difficulty urinating  Musculoskeletal: Negative for arthralgias and myalgias  Skin: Negative for rash  Neurological: Negative for dizziness, syncope, light-headedness and headaches  Psychiatric/Behavioral: Negative for self-injury, sleep disturbance and suicidal ideas  The patient is not nervous/anxious  Objective:        Physical Exam  Vitals signs and nursing note reviewed  Constitutional:       General: She is not in acute distress  Appearance: Normal appearance  She is well-developed  She is not diaphoretic     HENT:      Head: Normocephalic and atraumatic  Right Ear: Tympanic membrane, ear canal and external ear normal       Left Ear: Tympanic membrane, ear canal and external ear normal       Mouth/Throat:      Pharynx: No posterior oropharyngeal erythema  Eyes:      Conjunctiva/sclera: Conjunctivae normal       Pupils: Pupils are equal, round, and reactive to light  Neck:      Thyroid: No thyromegaly  Vascular: No carotid bruit  Cardiovascular:      Rate and Rhythm: Normal rate and regular rhythm  Heart sounds: Murmur present  Systolic murmur present with a grade of 1/6  No friction rub  No gallop  Pulmonary:      Effort: Pulmonary effort is normal  No respiratory distress  Breath sounds: Normal breath sounds  No wheezing  Abdominal:      General: Bowel sounds are normal  There is no distension  Palpations: Abdomen is soft  There is no mass  Tenderness: There is no abdominal tenderness  Lymphadenopathy:      Cervical: No cervical adenopathy  Skin:     General: Skin is warm and dry  Findings: No erythema or rash  Neurological:      Mental Status: She is alert and oriented to person, place, and time  Psychiatric:         Behavior: Behavior normal          Thought Content:  Thought content normal          Judgment: Judgment normal

## 2021-05-10 DIAGNOSIS — J44.9 CHRONIC OBSTRUCTIVE PULMONARY DISEASE, UNSPECIFIED COPD TYPE (HCC): ICD-10-CM

## 2021-08-09 DIAGNOSIS — I10 ESSENTIAL HYPERTENSION: ICD-10-CM

## 2021-08-09 DIAGNOSIS — E78.5 HYPERLIPIDEMIA, UNSPECIFIED HYPERLIPIDEMIA TYPE: ICD-10-CM

## 2021-08-09 RX ORDER — TRIAMTERENE AND HYDROCHLOROTHIAZIDE 37.5; 25 MG/1; MG/1
1 TABLET ORAL DAILY
Qty: 90 TABLET | Refills: 1 | Status: SHIPPED | OUTPATIENT
Start: 2021-08-09 | End: 2022-01-24 | Stop reason: SDUPTHER

## 2021-08-09 RX ORDER — LOVASTATIN 20 MG/1
20 TABLET ORAL DAILY
Qty: 90 TABLET | Refills: 1 | Status: SHIPPED | OUTPATIENT
Start: 2021-08-09 | End: 2022-01-24 | Stop reason: SDUPTHER

## 2021-09-24 ENCOUNTER — VBI (OUTPATIENT)
Dept: ADMINISTRATIVE | Facility: OTHER | Age: 76
End: 2021-09-24

## 2021-10-11 LAB
ALBUMIN SERPL-MCNC: 4.2 G/DL (ref 3.6–5.1)
ALBUMIN/GLOB SERPL: 1.7 (CALC) (ref 1–2.5)
ALP SERPL-CCNC: 66 U/L (ref 37–153)
ALT SERPL-CCNC: 16 U/L (ref 6–29)
AST SERPL-CCNC: 20 U/L (ref 10–35)
BILIRUB SERPL-MCNC: 0.5 MG/DL (ref 0.2–1.2)
BUN SERPL-MCNC: 20 MG/DL (ref 7–25)
BUN/CREAT SERPL: NORMAL (CALC) (ref 6–22)
CALCIUM SERPL-MCNC: 9.3 MG/DL (ref 8.6–10.4)
CHLORIDE SERPL-SCNC: 101 MMOL/L (ref 98–110)
CHOLEST SERPL-MCNC: 210 MG/DL
CHOLEST/HDLC SERPL: 4.3 (CALC)
CO2 SERPL-SCNC: 32 MMOL/L (ref 20–32)
CREAT SERPL-MCNC: 0.93 MG/DL (ref 0.6–0.93)
GLOBULIN SER CALC-MCNC: 2.5 G/DL (CALC) (ref 1.9–3.7)
GLUCOSE SERPL-MCNC: 99 MG/DL (ref 65–99)
HDLC SERPL-MCNC: 49 MG/DL
LDLC SERPL CALC-MCNC: 133 MG/DL (CALC)
NONHDLC SERPL-MCNC: 161 MG/DL (CALC)
POTASSIUM SERPL-SCNC: 3.9 MMOL/L (ref 3.5–5.3)
PROT SERPL-MCNC: 6.7 G/DL (ref 6.1–8.1)
SL AMB EGFR AFRICAN AMERICAN: 70 ML/MIN/1.73M2
SL AMB EGFR NON AFRICAN AMERICAN: 60 ML/MIN/1.73M2
SODIUM SERPL-SCNC: 141 MMOL/L (ref 135–146)
TRIGL SERPL-MCNC: 153 MG/DL

## 2021-10-22 ENCOUNTER — RA CDI HCC (OUTPATIENT)
Dept: OTHER | Facility: HOSPITAL | Age: 76
End: 2021-10-22

## 2021-10-29 ENCOUNTER — OFFICE VISIT (OUTPATIENT)
Dept: FAMILY MEDICINE CLINIC | Facility: CLINIC | Age: 76
End: 2021-10-29
Payer: COMMERCIAL

## 2021-10-29 VITALS
HEIGHT: 63 IN | OXYGEN SATURATION: 97 % | TEMPERATURE: 97.8 F | HEART RATE: 68 BPM | SYSTOLIC BLOOD PRESSURE: 128 MMHG | DIASTOLIC BLOOD PRESSURE: 78 MMHG | WEIGHT: 153.2 LBS | BODY MASS INDEX: 27.14 KG/M2

## 2021-10-29 DIAGNOSIS — Z23 ENCOUNTER FOR IMMUNIZATION: ICD-10-CM

## 2021-10-29 DIAGNOSIS — I10 BENIGN ESSENTIAL HYPERTENSION: ICD-10-CM

## 2021-10-29 DIAGNOSIS — E78.5 HYPERLIPIDEMIA, UNSPECIFIED HYPERLIPIDEMIA TYPE: ICD-10-CM

## 2021-10-29 DIAGNOSIS — Z12.2 SCREENING FOR LUNG CANCER: ICD-10-CM

## 2021-10-29 DIAGNOSIS — Z00.00 MEDICARE ANNUAL WELLNESS VISIT, SUBSEQUENT: Primary | ICD-10-CM

## 2021-10-29 DIAGNOSIS — J44.9 CHRONIC OBSTRUCTIVE PULMONARY DISEASE, UNSPECIFIED COPD TYPE (HCC): ICD-10-CM

## 2021-10-29 DIAGNOSIS — M85.80 OSTEOPENIA, UNSPECIFIED LOCATION: ICD-10-CM

## 2021-10-29 PROCEDURE — 99214 OFFICE O/P EST MOD 30 MIN: CPT | Performed by: PHYSICIAN ASSISTANT

## 2021-10-29 PROCEDURE — G0008 ADMIN INFLUENZA VIRUS VAC: HCPCS

## 2021-10-29 PROCEDURE — G0439 PPPS, SUBSEQ VISIT: HCPCS | Performed by: PHYSICIAN ASSISTANT

## 2021-10-29 PROCEDURE — 90662 IIV NO PRSV INCREASED AG IM: CPT

## 2021-10-29 RX ORDER — ALBUTEROL SULFATE 90 UG/1
2 AEROSOL, METERED RESPIRATORY (INHALATION) EVERY 4 HOURS PRN
Qty: 24 G | Refills: 1 | Status: SHIPPED | OUTPATIENT
Start: 2021-10-29 | End: 2022-07-05 | Stop reason: SDUPTHER

## 2021-11-19 ENCOUNTER — IMMUNIZATIONS (OUTPATIENT)
Dept: FAMILY MEDICINE CLINIC | Facility: HOSPITAL | Age: 76
End: 2021-11-19

## 2021-11-19 DIAGNOSIS — Z23 ENCOUNTER FOR IMMUNIZATION: Primary | ICD-10-CM

## 2021-11-19 PROCEDURE — 91300 COVID-19 PFIZER VACC 0.3 ML: CPT

## 2021-11-19 PROCEDURE — 0001A COVID-19 PFIZER VACC 0.3 ML: CPT

## 2021-11-22 DIAGNOSIS — J44.9 CHRONIC OBSTRUCTIVE PULMONARY DISEASE, UNSPECIFIED COPD TYPE (HCC): ICD-10-CM

## 2021-12-13 ENCOUNTER — HOSPITAL ENCOUNTER (OUTPATIENT)
Dept: RADIOLOGY | Facility: MEDICAL CENTER | Age: 76
Discharge: HOME | End: 2021-12-13
Payer: COMMERCIAL

## 2021-12-13 DIAGNOSIS — Z12.2 SCREENING FOR LUNG CANCER: ICD-10-CM

## 2021-12-13 PROCEDURE — 71271 CT THORAX LUNG CANCER SCR C-: CPT

## 2022-01-24 DIAGNOSIS — I10 ESSENTIAL HYPERTENSION: ICD-10-CM

## 2022-01-24 DIAGNOSIS — E78.5 HYPERLIPIDEMIA, UNSPECIFIED HYPERLIPIDEMIA TYPE: ICD-10-CM

## 2022-01-24 RX ORDER — LOVASTATIN 20 MG/1
20 TABLET ORAL DAILY
Qty: 90 TABLET | Refills: 1 | Status: SHIPPED | OUTPATIENT
Start: 2022-01-24 | End: 2022-08-01 | Stop reason: SDUPTHER

## 2022-01-24 RX ORDER — TRIAMTERENE AND HYDROCHLOROTHIAZIDE 37.5; 25 MG/1; MG/1
1 TABLET ORAL DAILY
Qty: 90 TABLET | Refills: 1 | Status: SHIPPED | OUTPATIENT
Start: 2022-01-24 | End: 2022-08-01 | Stop reason: SDUPTHER

## 2022-03-04 ENCOUNTER — HOSPITAL ENCOUNTER (OUTPATIENT)
Dept: RADIOLOGY | Facility: MEDICAL CENTER | Age: 77
Discharge: HOME | End: 2022-03-04
Payer: COMMERCIAL

## 2022-03-04 DIAGNOSIS — M85.80 OSTEOPENIA, UNSPECIFIED LOCATION: ICD-10-CM

## 2022-03-04 DIAGNOSIS — Z13.820 SCREENING FOR OSTEOPOROSIS: ICD-10-CM

## 2022-03-04 PROCEDURE — 77080 DXA BONE DENSITY AXIAL: CPT

## 2022-03-09 ENCOUNTER — TELEPHONE (OUTPATIENT)
Dept: FAMILY MEDICINE CLINIC | Facility: CLINIC | Age: 77
End: 2022-03-09

## 2022-03-09 DIAGNOSIS — I10 BENIGN ESSENTIAL HYPERTENSION: ICD-10-CM

## 2022-03-09 DIAGNOSIS — E78.5 HYPERLIPIDEMIA, UNSPECIFIED HYPERLIPIDEMIA TYPE: ICD-10-CM

## 2022-03-09 DIAGNOSIS — M85.80 OSTEOPENIA, UNSPECIFIED LOCATION: Primary | ICD-10-CM

## 2022-03-09 NOTE — TELEPHONE ENCOUNTER
Spoke with patient  Her DEXA scan shows bone loss  She had been on Fosamax years ago continue with calcium and vitamin-D  We discussed this Prolia another options when she comes in for her April follow-up appointment    Routine lab work ordered as well

## 2022-04-14 LAB
25(OH)D3 SERPL-MCNC: 42 NG/ML (ref 30–100)
ALBUMIN SERPL-MCNC: 4.1 G/DL (ref 3.6–5.1)
ALBUMIN/GLOB SERPL: 1.6 (CALC) (ref 1–2.5)
ALP SERPL-CCNC: 76 U/L (ref 37–153)
ALT SERPL-CCNC: 16 U/L (ref 6–29)
AST SERPL-CCNC: 17 U/L (ref 10–35)
BILIRUB SERPL-MCNC: 0.7 MG/DL (ref 0.2–1.2)
BUN SERPL-MCNC: 14 MG/DL (ref 7–25)
BUN/CREAT SERPL: NORMAL (CALC) (ref 6–22)
CALCIUM SERPL-MCNC: 9.6 MG/DL (ref 8.6–10.4)
CHLORIDE SERPL-SCNC: 101 MMOL/L (ref 98–110)
CHOLEST SERPL-MCNC: 186 MG/DL
CHOLEST/HDLC SERPL: 4.2 (CALC)
CO2 SERPL-SCNC: 32 MMOL/L (ref 20–32)
CREAT SERPL-MCNC: 0.93 MG/DL (ref 0.6–0.93)
GLOBULIN SER CALC-MCNC: 2.6 G/DL (CALC) (ref 1.9–3.7)
GLUCOSE SERPL-MCNC: 92 MG/DL (ref 65–99)
HDLC SERPL-MCNC: 44 MG/DL
LDLC SERPL CALC-MCNC: 116 MG/DL (CALC)
NONHDLC SERPL-MCNC: 142 MG/DL (CALC)
POTASSIUM SERPL-SCNC: 3.8 MMOL/L (ref 3.5–5.3)
PROT SERPL-MCNC: 6.7 G/DL (ref 6.1–8.1)
SL AMB EGFR AFRICAN AMERICAN: 69 ML/MIN/1.73M2
SL AMB EGFR NON AFRICAN AMERICAN: 60 ML/MIN/1.73M2
SODIUM SERPL-SCNC: 140 MMOL/L (ref 135–146)
TRIGL SERPL-MCNC: 148 MG/DL

## 2022-04-29 ENCOUNTER — OFFICE VISIT (OUTPATIENT)
Dept: FAMILY MEDICINE CLINIC | Facility: CLINIC | Age: 77
End: 2022-04-29
Payer: COMMERCIAL

## 2022-04-29 VITALS
OXYGEN SATURATION: 94 % | DIASTOLIC BLOOD PRESSURE: 80 MMHG | WEIGHT: 146.6 LBS | HEIGHT: 63 IN | HEART RATE: 77 BPM | BODY MASS INDEX: 25.98 KG/M2 | SYSTOLIC BLOOD PRESSURE: 120 MMHG | TEMPERATURE: 97.9 F

## 2022-04-29 DIAGNOSIS — I10 BENIGN ESSENTIAL HYPERTENSION: Primary | ICD-10-CM

## 2022-04-29 DIAGNOSIS — M85.80 OSTEOPENIA, UNSPECIFIED LOCATION: ICD-10-CM

## 2022-04-29 DIAGNOSIS — E78.5 HYPERLIPIDEMIA, UNSPECIFIED HYPERLIPIDEMIA TYPE: ICD-10-CM

## 2022-04-29 DIAGNOSIS — J01.00 ACUTE MAXILLARY SINUSITIS, RECURRENCE NOT SPECIFIED: ICD-10-CM

## 2022-04-29 DIAGNOSIS — J44.9 CHRONIC OBSTRUCTIVE PULMONARY DISEASE, UNSPECIFIED COPD TYPE (HCC): ICD-10-CM

## 2022-04-29 PROCEDURE — 99214 OFFICE O/P EST MOD 30 MIN: CPT | Performed by: PHYSICIAN ASSISTANT

## 2022-04-29 PROCEDURE — 1101F PT FALLS ASSESS-DOCD LE1/YR: CPT | Performed by: PHYSICIAN ASSISTANT

## 2022-04-29 RX ORDER — AMOXICILLIN 500 MG/1
500 CAPSULE ORAL EVERY 8 HOURS SCHEDULED
Qty: 30 CAPSULE | Refills: 0 | Status: SHIPPED | OUTPATIENT
Start: 2022-04-29 | End: 2022-05-09

## 2022-04-29 NOTE — PROGRESS NOTES
BMI Counseling: Body mass index is 25 97 kg/m²  The BMI is above normal  Nutrition recommendations include decreasing portion sizes and moderation in carbohydrate intake  Exercise recommendations include exercising 3-5 times per week  Rationale for BMI follow-up plan is due to patient being overweight or obese  Depression Screening and Follow-up Plan: Patient was screened for depression during today's encounter  They screened negative with a PHQ-2 score of 0  Assessment/Plan:     Diagnoses and all orders for this visit:    Benign essential hypertension  Comments:  Blood pressure is at goal continue current regimen  Orders:  -     Comprehensive metabolic panel; Future  -     Lipid panel; Future    Hyperlipidemia, unspecified hyperlipidemia type  Comments:  Lipids are at goal continue statin therapy and low-fat diet  Orders:  -     Comprehensive metabolic panel; Future  -     Lipid panel; Future    Chronic obstructive pulmonary disease, unspecified COPD type ([facility] )  Comments:  Breathing is stable continue current regimen    Osteopenia, unspecified location  Comments:  Continue calcium vitamin-D supplements and weight-bearing exercise    Acute maxillary sinusitis, recurrence not specified  -     amoxicillin (AMOXIL) 500 mg capsule; Take 1 capsule (500 mg total) by mouth every 8 (eight) hours for 10 days          Subjective:      Patient ID: Libia Adhikari is a 69 y o  female  Patient presents in the office for follow up chronic conditions  Patient has hypertension  Her blood pressure is well controlled with Maxzide 25 mg  For hyperlipidemia she is on lovastatin 20 mg  She has mild COPD  She is on Spiriva inhaled capsules daily and uses ProAir if needed  Her breathing is currently stable  Labs reviewed with patient show normal CMP  Lipid panel is at goal   Vitamin-D was normal   Patient has been having some right left cheek sinus pain and pressure for over a couple of weeks        The following portions of the patient's history were reviewed and updated as appropriate:   She   Patient Active Problem List    Diagnosis Date Noted    Atypical ductal hyperplasia of breast 01/09/2019    Fibrocystic disease of breast 01/09/2019    COPD (chronic obstructive pulmonary disease) ([facility] ) 10/10/2018    Polyp of sigmoid colon 04/16/2018    Osteopenia 11/18/2016    Family history of malignant neoplasm of breast 07/15/2016    Cardiac murmur 02/17/2016    Benign essential hypertension 06/11/2012    Hyperlipidemia 06/11/2012     Current Outpatient Medications   Medication Sig Dispense Refill    triamcinolone (KENALOG) 0 1 % oral topical paste Apply to the mouth or throat As needed       albuterol (ProAir HFA) 90 mcg/act inhaler Inhale 2 puffs every 4 (four) hours as needed for shortness of breath 24 g 1    amoxicillin (AMOXIL) 500 mg capsule Take 1 capsule (500 mg total) by mouth every 8 (eight) hours for 10 days 30 capsule 0    Calcium Carb-Cholecalciferol (CALCIUM 1000 + D PO) Take by mouth      coenzyme Q-10 100 MG capsule Take by mouth      lovastatin (MEVACOR) 20 mg tablet Take 1 tablet (20 mg total) by mouth daily 90 tablet 1    Multiple Vitamins-Minerals (MULTI FOR HER) TABS Take by mouth      Omega-3 Fatty Acids (FISH OIL) 645 MG CAPS Take by mouth      tiotropium (SPIRIVA) 18 mcg inhalation capsule Place 1 capsule (18 mcg total) into inhaler and inhale daily 90 capsule 1    triamterene-hydrochlorothiazide (MAXZIDE-25) 37 5-25 mg per tablet Take 1 tablet by mouth daily 90 tablet 1     No current facility-administered medications for this visit  She has No Known Allergies       Review of Systems   Constitutional: Negative for activity change, fever and unexpected weight change  HENT: Positive for congestion, sinus pressure and sinus pain  Negative for ear pain and sore throat  Eyes: Negative for visual disturbance  Respiratory: Negative for cough, shortness of breath and wheezing      Cardiovascular: Negative for chest pain and leg swelling  Gastrointestinal: Negative for abdominal pain, blood in stool, constipation, diarrhea, nausea and vomiting  Genitourinary: Negative for difficulty urinating  Musculoskeletal: Negative for arthralgias and myalgias  Skin: Negative for rash  Neurological: Negative for dizziness, syncope, light-headedness and headaches  Psychiatric/Behavioral: Negative for self-injury, sleep disturbance and suicidal ideas  The patient is not nervous/anxious  Objective:        Physical Exam  Vitals and nursing note reviewed  Constitutional:       General: She is not in acute distress  Appearance: Normal appearance  She is well-developed  She is not diaphoretic  HENT:      Head: Normocephalic and atraumatic  Right Ear: Tympanic membrane, ear canal and external ear normal       Left Ear: Tympanic membrane, ear canal and external ear normal       Nose:      Right Sinus: No maxillary sinus tenderness or frontal sinus tenderness  Left Sinus: Maxillary sinus tenderness present  No frontal sinus tenderness  Mouth/Throat:      Pharynx: No posterior oropharyngeal erythema  Eyes:      Conjunctiva/sclera: Conjunctivae normal       Pupils: Pupils are equal, round, and reactive to light  Neck:      Thyroid: No thyromegaly  Vascular: No carotid bruit  Cardiovascular:      Rate and Rhythm: Normal rate and regular rhythm  Heart sounds: Normal heart sounds  No murmur heard  No friction rub  No gallop  Pulmonary:      Effort: Pulmonary effort is normal  No respiratory distress  Breath sounds: Normal breath sounds  No wheezing  Abdominal:      General: Bowel sounds are normal  There is no distension  Palpations: Abdomen is soft  There is no mass  Tenderness: There is no abdominal tenderness  Musculoskeletal:      Right lower leg: No edema  Left lower leg: No edema  Lymphadenopathy:      Cervical: Cervical adenopathy present  Skin:     General: Skin is warm and dry  Findings: No erythema or rash  Neurological:      General: No focal deficit present  Mental Status: She is alert and oriented to person, place, and time  Psychiatric:         Behavior: Behavior normal          Thought Content:  Thought content normal          Judgment: Judgment normal

## 2022-05-17 DIAGNOSIS — J44.9 CHRONIC OBSTRUCTIVE PULMONARY DISEASE, UNSPECIFIED COPD TYPE (HCC): ICD-10-CM

## 2022-06-01 ENCOUNTER — OFFICE VISIT (OUTPATIENT)
Dept: FAMILY MEDICINE CLINIC | Facility: CLINIC | Age: 77
End: 2022-06-01
Payer: COMMERCIAL

## 2022-06-01 VITALS
SYSTOLIC BLOOD PRESSURE: 122 MMHG | BODY MASS INDEX: 25.99 KG/M2 | HEIGHT: 63 IN | TEMPERATURE: 97.1 F | DIASTOLIC BLOOD PRESSURE: 80 MMHG | WEIGHT: 146.7 LBS | HEART RATE: 64 BPM

## 2022-06-01 DIAGNOSIS — J30.1 SEASONAL ALLERGIC RHINITIS DUE TO POLLEN: Primary | ICD-10-CM

## 2022-06-01 PROBLEM — J30.9 ALLERGIC RHINITIS: Status: ACTIVE | Noted: 2022-06-01

## 2022-06-01 PROCEDURE — 99213 OFFICE O/P EST LOW 20 MIN: CPT | Performed by: FAMILY MEDICINE

## 2022-06-01 PROCEDURE — 3079F DIAST BP 80-89 MM HG: CPT | Performed by: FAMILY MEDICINE

## 2022-06-01 PROCEDURE — 1036F TOBACCO NON-USER: CPT | Performed by: FAMILY MEDICINE

## 2022-06-01 PROCEDURE — 1160F RVW MEDS BY RX/DR IN RCRD: CPT | Performed by: FAMILY MEDICINE

## 2022-06-01 PROCEDURE — 3074F SYST BP LT 130 MM HG: CPT | Performed by: FAMILY MEDICINE

## 2022-06-01 NOTE — PROGRESS NOTES
Assessment/Plan:    Allergic rhinitis  Seasonal allergies  Worsened by baseline COPD  Patient may continue using albuterol as needed  As well as Flonase  Using loratadine, Claritin once a day may help with symptoms        Subjective:      Patient ID: Amanda Patel is a 69 y o  female  HPI    64-year-old female patient presents for evaluation of persistent sinus symptoms  Patient was seen on 04/29/2022 for acute maxillary sinusitis and was prescribed 500 mg of amoxicillin every 8 hours for total of 10 days  Patient reports her symptoms improved after taking amoxicillin however, symptoms started again on 05/25/2022  Overall her symptoms are improving compared to last wednesday  Patient denies any significant allergy symptoms in the past however she does have baseline COPD  Initially she did have increased use of her inhalers  Patient has tried some Flonase with improvement in her symptoms  Denies any recent sick contact  Review of Systems   Constitutional: Negative for chills and fever  HENT: Positive for congestion, postnasal drip, rhinorrhea and sore throat  Respiratory: Positive for cough (productive cough, baseline COPD)  Negative for chest tightness and shortness of breath  Cardiovascular: Negative for chest pain  Gastrointestinal: Negative for abdominal pain, diarrhea, nausea and vomiting  Neurological:        No change in sense of taste or smell     Objective:    /80 (BP Location: Right arm, Patient Position: Sitting, Cuff Size: Standard)   Pulse 64   Temp (!) 97 1 °F (36 2 °C)   Ht 5' 3" (1 6 m)   Wt 66 5 kg (146 lb 11 2 oz)   BMI 25 99 kg/m²       Physical Exam  Vitals reviewed  Constitutional:       General: She is not in acute distress  Appearance: Normal appearance  She is not ill-appearing, toxic-appearing or diaphoretic  HENT:      Head: Normocephalic  Nose: Congestion present        Mouth/Throat:      Mouth: Mucous membranes are moist       Comments: Post nasal drip noted  Cardiovascular:      Rate and Rhythm: Normal rate  Pulses: Normal pulses  Heart sounds: Normal heart sounds  Pulmonary:      Effort: Pulmonary effort is normal       Breath sounds: Normal breath sounds  Abdominal:      General: Abdomen is flat  Bowel sounds are normal  There is no distension  Palpations: Abdomen is soft  Musculoskeletal:         General: No swelling, tenderness, deformity or signs of injury  Normal range of motion  Skin:     General: Skin is warm and dry  Capillary Refill: Capillary refill takes less than 2 seconds  Neurological:      General: No focal deficit present  Mental Status: She is alert and oriented to person, place, and time  Psychiatric:         Mood and Affect: Mood normal             OLEG Fernandes  Family Medicine    Please excuse any "sound-alike" errors that may have ocurred during the process of dictation  Parts of this note have been dictated and there may be errors present in the transcription process  Thank you

## 2022-06-01 NOTE — ASSESSMENT & PLAN NOTE
Seasonal allergies  Worsened by baseline COPD  Patient may continue using albuterol as needed  As well as Flonase  Using loratadine, Claritin once a day may help with symptoms

## 2022-07-05 DIAGNOSIS — J44.9 CHRONIC OBSTRUCTIVE PULMONARY DISEASE, UNSPECIFIED COPD TYPE (HCC): ICD-10-CM

## 2022-07-05 RX ORDER — ALBUTEROL SULFATE 90 UG/1
2 AEROSOL, METERED RESPIRATORY (INHALATION) EVERY 4 HOURS PRN
Qty: 24 G | Refills: 1 | Status: SHIPPED | OUTPATIENT
Start: 2022-07-05

## 2022-08-01 DIAGNOSIS — I10 ESSENTIAL HYPERTENSION: ICD-10-CM

## 2022-08-01 DIAGNOSIS — E78.5 HYPERLIPIDEMIA, UNSPECIFIED HYPERLIPIDEMIA TYPE: ICD-10-CM

## 2022-08-01 RX ORDER — TRIAMTERENE AND HYDROCHLOROTHIAZIDE 37.5; 25 MG/1; MG/1
1 TABLET ORAL DAILY
Qty: 90 TABLET | Refills: 1 | Status: SHIPPED | OUTPATIENT
Start: 2022-08-01

## 2022-08-01 RX ORDER — LOVASTATIN 20 MG/1
20 TABLET ORAL DAILY
Qty: 90 TABLET | Refills: 1 | Status: SHIPPED | OUTPATIENT
Start: 2022-08-01

## 2022-10-10 LAB
ALBUMIN SERPL-MCNC: 4.2 G/DL (ref 3.6–5.1)
ALBUMIN/GLOB SERPL: 1.8 (CALC) (ref 1–2.5)
ALP SERPL-CCNC: 65 U/L (ref 37–153)
ALT SERPL-CCNC: 17 U/L (ref 6–29)
AST SERPL-CCNC: 20 U/L (ref 10–35)
BILIRUB SERPL-MCNC: 0.5 MG/DL (ref 0.2–1.2)
BUN SERPL-MCNC: 19 MG/DL (ref 7–25)
BUN/CREAT SERPL: ABNORMAL (CALC) (ref 6–22)
CALCIUM SERPL-MCNC: 10 MG/DL (ref 8.6–10.4)
CHLORIDE SERPL-SCNC: 102 MMOL/L (ref 98–110)
CHOLEST SERPL-MCNC: 208 MG/DL
CHOLEST/HDLC SERPL: 4.2 (CALC)
CO2 SERPL-SCNC: 33 MMOL/L (ref 20–32)
CREAT SERPL-MCNC: 0.84 MG/DL (ref 0.6–1)
GFR/BSA.PRED SERPLBLD CYS-BASED-ARV: 72 ML/MIN/1.73M2
GLOBULIN SER CALC-MCNC: 2.4 G/DL (CALC) (ref 1.9–3.7)
GLUCOSE SERPL-MCNC: 93 MG/DL (ref 65–99)
HDLC SERPL-MCNC: 49 MG/DL
LDLC SERPL CALC-MCNC: 134 MG/DL (CALC)
NONHDLC SERPL-MCNC: 159 MG/DL (CALC)
POTASSIUM SERPL-SCNC: 4.2 MMOL/L (ref 3.5–5.3)
PROT SERPL-MCNC: 6.6 G/DL (ref 6.1–8.1)
SODIUM SERPL-SCNC: 141 MMOL/L (ref 135–146)
TRIGL SERPL-MCNC: 137 MG/DL

## 2022-10-31 ENCOUNTER — OFFICE VISIT (OUTPATIENT)
Dept: FAMILY MEDICINE CLINIC | Facility: CLINIC | Age: 77
End: 2022-10-31

## 2022-10-31 VITALS
HEART RATE: 57 BPM | OXYGEN SATURATION: 94 % | BODY MASS INDEX: 25.73 KG/M2 | SYSTOLIC BLOOD PRESSURE: 132 MMHG | HEIGHT: 63 IN | WEIGHT: 145.2 LBS | TEMPERATURE: 97.9 F | DIASTOLIC BLOOD PRESSURE: 80 MMHG

## 2022-10-31 DIAGNOSIS — I10 BENIGN ESSENTIAL HYPERTENSION: Primary | ICD-10-CM

## 2022-10-31 DIAGNOSIS — Z12.2 SCREENING FOR LUNG CANCER: ICD-10-CM

## 2022-10-31 DIAGNOSIS — Z00.00 MEDICARE ANNUAL WELLNESS VISIT, SUBSEQUENT: ICD-10-CM

## 2022-10-31 DIAGNOSIS — E78.5 HYPERLIPIDEMIA, UNSPECIFIED HYPERLIPIDEMIA TYPE: ICD-10-CM

## 2022-10-31 DIAGNOSIS — J44.9 CHRONIC OBSTRUCTIVE PULMONARY DISEASE, UNSPECIFIED COPD TYPE (HCC): ICD-10-CM

## 2022-10-31 DIAGNOSIS — Z23 ENCOUNTER FOR IMMUNIZATION: ICD-10-CM

## 2022-10-31 NOTE — PATIENT INSTRUCTIONS
Medicare Preventive Visit Patient Instructions  Thank you for completing your Welcome to Medicare Visit or Medicare Annual Wellness Visit today  Your next wellness visit will be due in one year (11/1/2023)  The screening/preventive services that you may require over the next 5-10 years are detailed below  Some tests may not apply to you based off risk factors and/or age  Screening tests ordered at today's visit but not completed yet may show as past due  Also, please note that scanned in results may not display below  Preventive Screenings:  Service Recommendations Previous Testing/Comments   Colorectal Cancer Screening  * Colonoscopy    * Fecal Occult Blood Test (FOBT)/Fecal Immunochemical Test (FIT)  * Fecal DNA/Cologuard Test  * Flexible Sigmoidoscopy Age: 39-70 years old   Colonoscopy: every 10 years (may be performed more frequently if at higher risk)  OR  FOBT/FIT: every 1 year  OR  Cologuard: every 3 years  OR  Sigmoidoscopy: every 5 years  Screening may be recommended earlier than age 39 if at higher risk for colorectal cancer  Also, an individualized decision between you and your healthcare provider will decide whether screening between the ages of 74-80 would be appropriate  Colonoscopy: 08/07/2018  FOBT/FIT: Not on file  Cologuard: Not on file  Sigmoidoscopy: Not on file          Breast Cancer Screening Age: 36 years old  Frequency: every 1-2 years  Not required if history of left and right mastectomy Mammogram: 09/08/2020        Cervical Cancer Screening Between the ages of 21-29, pap smear recommended once every 3 years  Between the ages of 33-67, can perform pap smear with HPV co-testing every 5 years     Recommendations may differ for women with a history of total hysterectomy, cervical cancer, or abnormal pap smears in past  Pap Smear: Not on file    Screening Not Indicated   Hepatitis C Screening Once for adults born between [facility]  More frequently in patients at high risk for Hepatitis C Hep C Antibody: Not on file        Diabetes Screening 1-2 times per year if you're at risk for diabetes or have pre-diabetes Fasting glucose: 73 mg/dL (10/7/2019)  A1C: No results in last 5 years (No results in last 5 years)  Screening Current   Cholesterol Screening Once every 5 years if you don't have a lipid disorder  May order more often based on risk factors  Lipid panel: 10/10/2022    Screening Not Indicated  History Lipid Disorder     Other Preventive Screenings Covered by Medicare:  1  Abdominal Aortic Aneurysm (AAA) Screening: covered once if your at risk  You're considered to be at risk if you have a family history of AAA  2  Lung Cancer Screening: covers low dose CT scan once per year if you meet all of the following conditions: (1) Age 50-69; (2) No signs or symptoms of lung cancer; (3) Current smoker or have quit smoking within the last 15 years; (4) You have a tobacco smoking history of at least 20 pack years (packs per day multiplied by number of years you smoked); (5) You get a written order from a healthcare provider  3  Glaucoma Screening: covered annually if you're considered high risk: (1) You have diabetes OR (2) Family history of glaucoma OR (3)  aged 48 and older OR (3)  American aged 72 and older  3  Osteoporosis Screening: covered every 2 years if you meet one of the following conditions: (1) You're estrogen deficient and at risk for osteoporosis based off medical history and other findings; (2) Have a vertebral abnormality; (3) On glucocorticoid therapy for more than 3 months; (4) Have primary hyperparathyroidism; (5) On osteoporosis medications and need to assess response to drug therapy  · Last bone density test (DXA Scan): 03/04/2022   5  HIV Screening: covered annually if you're between the age of 15-65  Also covered annually if you are younger than 13 and older than 72 with risk factors for HIV infection   For pregnant patients, it is covered up to 3 times per pregnancy  Immunizations:  Immunization Recommendations   Influenza Vaccine Annual influenza vaccination during flu season is recommended for all persons aged >= 6 months who do not have contraindications   Pneumococcal Vaccine   * Pneumococcal conjugate vaccine = PCV13 (Prevnar 13), PCV15 (Vaxneuvance), PCV20 (Prevnar 20)  * Pneumococcal polysaccharide vaccine = PPSV23 (Pneumovax) Adults 25-60 years old: 1-3 doses may be recommended based on certain risk factors  Adults 72 years old: 1-2 doses may be recommended based off what pneumonia vaccine you previously received   Hepatitis B Vaccine 3 dose series if at intermediate or high risk (ex: diabetes, end stage renal disease, liver disease)   Tetanus (Td) Vaccine - COST NOT COVERED BY MEDICARE PART B Following completion of primary series, a booster dose should be given every 10 years to maintain immunity against tetanus  Td may also be given as tetanus wound prophylaxis  Tdap Vaccine - COST NOT COVERED BY MEDICARE PART B Recommended at least once for all adults  For pregnant patients, recommended with each pregnancy  Shingles Vaccine (Shingrix) - COST NOT COVERED BY MEDICARE PART B  2 shot series recommended in those aged 48 and above     Health Maintenance Due:      Topic Date Due   • Hepatitis C Screening  Never done   • DXA SCAN  03/04/2024     Immunizations Due:      Topic Date Due   • COVID-19 Vaccine (4 - Booster for Pfizer series) 03/19/2022   • Influenza Vaccine (1) 09/01/2022     Advance Directives   What are advance directives? Advance directives are legal documents that state your wishes and plans for medical care  These plans are made ahead of time in case you lose your ability to make decisions for yourself  Advance directives can apply to any medical decision, such as the treatments you want, and if you want to donate organs  What are the types of advance directives?   There are many types of advance directives, and each state has rules about how to use them  You may choose a combination of any of the following:  · Living will: This is a written record of the treatment you want  You can also choose which treatments you do not want, which to limit, and which to stop at a certain time  This includes surgery, medicine, IV fluid, and tube feedings  · Durable power of  for healthcare [place] [facility]): This is a written record that states who you want to make healthcare choices for you when you are unable to make them for yourself  This person, called a proxy, is usually a family member or a friend  You may choose more than 1 proxy  · Do not resuscitate (DNR) order:  A DNR order is used in case your heart stops beating or you stop breathing  It is a request not to have certain forms of treatment, such as CPR  A DNR order may be included in other types of advance directives  · Medical directive: This covers the care that you want if you are in a coma, near death, or unable to make decisions for yourself  You can list the treatments you want for each condition  Treatment may include pain medicine, surgery, blood transfusions, dialysis, IV or tube feedings, and a ventilator (breathing machine)  · Values history: This document has questions about your views, beliefs, and how you feel and think about life  This information can help others choose the care that you would choose  Why are advance directives important? An advance directive helps you control your care  Although spoken wishes may be used, it is better to have your wishes written down  Spoken wishes can be misunderstood, or not followed  Treatments may be given even if you do not want them  An advance directive may make it easier for your family to make difficult choices about your care     Weight Management   Why it is important to manage your weight:  Being overweight increases your risk of health conditions such as heart disease, high blood pressure, type 2 diabetes, and certain types of cancer  It can also increase your risk for osteoarthritis, sleep apnea, and other respiratory problems  Aim for a slow, steady weight loss  Even a small amount of weight loss can lower your risk of health problems  How to lose weight safely:  A safe and healthy way to lose weight is to eat fewer calories and get regular exercise  You can lose up about 1 pound a week by decreasing the number of calories you eat by 500 calories each day  Healthy meal plan for weight management:  A healthy meal plan includes a variety of foods, contains fewer calories, and helps you stay healthy  A healthy meal plan includes the following:  · Eat whole-grain foods more often  A healthy meal plan should contain fiber  Fiber is the part of grains, fruits, and vegetables that is not broken down by your body  Whole-grain foods are healthy and provide extra fiber in your diet  Some examples of whole-grain foods are whole-wheat breads and pastas, oatmeal, brown rice, and bulgur  · Eat a variety of vegetables every day  Include dark, leafy greens such as spinach, kale, sonu greens, and mustard greens  Eat yellow and orange vegetables such as carrots, sweet potatoes, and winter squash  · Eat a variety of fruits every day  Choose fresh or canned fruit (canned in its own juice or light syrup) instead of juice  Fruit juice has very little or no fiber  · Eat low-fat dairy foods  Drink fat-free (skim) milk or 1% milk  Eat fat-free yogurt and low-fat cottage cheese  Try low-fat cheeses such as mozzarella and other reduced-fat cheeses  · Choose meat and other protein foods that are low in fat  Choose beans or other legumes such as split peas or lentils  Choose fish, skinless poultry (chicken or turkey), or lean cuts of red meat (beef or pork)  Before you cook meat or poultry, cut off any visible fat  · Use less fat and oil  Try baking foods instead of frying them   Add less fat, such as margarine, sour cream, regular salad dressing and mayonnaise to foods  Eat fewer high-fat foods  Some examples of high-fat foods include french fries, doughnuts, ice cream, and cakes  · Eat fewer sweets  Limit foods and drinks that are high in sugar  This includes candy, cookies, regular soda, and sweetened drinks  Exercise:  Exercise at least 30 minutes per day on most days of the week  Some examples of exercise include walking, biking, dancing, and swimming  You can also fit in more physical activity by taking the stairs instead of the elevator or parking farther away from stores  Ask your healthcare provider about the best exercise plan for you  © Copyright [employer] 2018 Information is for End User's use only and may not be sold, redistributed or otherwise used for commercial purposes   All illustrations and images included in CareNotes® are the copyrighted property of A [employer] A M [employer]  or [address] [place] [address] [employer]

## 2022-10-31 NOTE — PROGRESS NOTES
Assessment and Plan:     Problem List Items Addressed This Visit        Respiratory    COPD (chronic obstructive pulmonary disease) ([address] )       Cardiovascular and Mediastinum    Benign essential hypertension - Primary    Relevant Orders    Comprehensive metabolic panel       Other    Hyperlipidemia    Relevant Orders    Lipid panel      Other Visit Diagnoses     Medicare annual wellness visit, subsequent        Screening for lung cancer        CT scan due in December    Relevant Orders    CT lung screening program          Depression Screening and Follow-up Plan: Patient was screened for depression during today's encounter  They screened negative with a PHQ-2 score of 0  Preventive health issues were discussed with patient, and age appropriate screening tests were ordered as noted in patient's After Visit Summary  Personalized health advice and appropriate referrals for health education or preventive services given if needed, as noted in patient's After Visit Summary  History of Present Illness:     Patient presents for a Medicare Wellness Visit    Presents in the office for follow up chronic conditions  Patient has hypertension  Her blood pressure is well controlled with Maxzide 25 mg  For hyperlipidemia she is on lovastatin 20 mg  She has mild COPD  She is on Spiriva capsules daily and ProAir as needed  Patient states her breathing has been stable  For osteopenia she is on calcium vitamin-D supplements  Patient just had her mammogram   We will update her flu vaccine today  Discussed COVID vaccine update  Due for lung cancer screening on 12/14/2022  Labs reviewed with patient show good CMP    Lipid panel is acceptable     Patient Care Team:  Lisa Griffiths PA-C as PCP - General (Family Medicine)  Josse Nielson MD Wallis Henle, Rendall Cash, MD     Review of Systems:     Review of Systems   Constitutional: Negative for activity change and unexpected weight change  HENT: Negative for ear pain and sore throat  Eyes: Negative for visual disturbance  Respiratory: Negative for cough, shortness of breath and wheezing  Cardiovascular: Negative for chest pain and leg swelling  Gastrointestinal: Negative for abdominal pain, blood in stool, constipation, diarrhea, nausea and vomiting  Genitourinary: Negative for difficulty urinating  Musculoskeletal: Negative for arthralgias and myalgias  Skin: Negative for rash  Neurological: Negative for dizziness, syncope, light-headedness and headaches  Psychiatric/Behavioral: Negative for self-injury, sleep disturbance and suicidal ideas  The patient is not nervous/anxious  Problem List:     Patient Active Problem List   Diagnosis   • Benign essential hypertension   • Cardiac murmur   • Hyperlipidemia   • Osteopenia   • Polyp of sigmoid colon   • COPD (chronic obstructive pulmonary disease) (HCC)   • Atypical ductal hyperplasia of breast   • Family history of malignant neoplasm of breast   • Fibrocystic disease of breast   • Allergic rhinitis      Past Medical and Surgical History:     Past Medical History:   Diagnosis Date   • Hyperlipemia    • Hypertension    • Multiple benign polyps of large intestine      Past Surgical History:   Procedure Laterality Date   • ARTHROSCOPY KNEE Left    • INCISIONAL BREAST BIOPSY     • SKIN LESION EXCISION      lips benign   • VARICOSE VEIN SURGERY      ligation      Family History:     Family History   Problem Relation Age of Onset   • Hypertension Mother    • Pancreatic cancer Mother    • Other Father         MVA with significant injury   • Breast cancer Sister    • Lung cancer Brother       Social History:     Social History     Socioeconomic History   • Marital status:       Spouse name: None   • Number of children: None   • Years of education: None   • Highest education level: None   Occupational History   • None   Tobacco Use   • Smoking status: Former Smoker   • Smokeless tobacco: Never Used   Vaping Use   • Vaping Use: Never used   Substance and Sexual Activity   • Alcohol use: No     Comment: social drinker as per [employer]   • Drug use: No   • Sexual activity: None   Other Topics Concern   • None   Social History Narrative   • None     Social Determinants of Health     Financial Resource Strain: Low Risk    • Difficulty of Paying Living Expenses: Not hard at all   Food Insecurity: Not on file   Transportation Needs: No Transportation Needs   • Lack of Transportation (Medical): No   • Lack of Transportation (Non-Medical): No   Physical Activity: Not on file   Stress: Not on file   Social Connections: Not on file   Intimate Partner Violence: Not on file   Housing Stability: Not on file      Medications and Allergies:     Current Outpatient Medications   Medication Sig Dispense Refill   • albuterol (ProAir HFA) 90 mcg/act inhaler Inhale 2 puffs every 4 (four) hours as needed for shortness of breath 24 g 1   • Calcium Carb-Cholecalciferol (CALCIUM 1000 + D PO) Take by mouth     • coenzyme Q-10 100 MG capsule Take by mouth     • lovastatin (MEVACOR) 20 mg tablet Take 1 tablet (20 mg total) by mouth daily 90 tablet 1   • Multiple Vitamins-Minerals (MULTI FOR HER) TABS Take by mouth     • Omega-3 Fatty Acids (FISH OIL) 645 MG CAPS Take by mouth     • tiotropium (SPIRIVA) 18 mcg inhalation capsule Place 1 capsule (18 mcg total) into inhaler and inhale in the morning  90 capsule 1   • triamterene-hydrochlorothiazide (MAXZIDE-25) 37 5-25 mg per tablet Take 1 tablet by mouth daily 90 tablet 1   • triamcinolone (KENALOG) 0 1 % oral topical paste Apply to the mouth or throat As needed  (Patient not taking: No sig reported)       No current facility-administered medications for this visit       No Known Allergies   Immunizations:     Immunization History   Administered Date(s) Administered   • COVID-19 PFIZER VACCINE 0 3 ML IM 02/15/2021, 03/06/2021, 11/19/2021   • INFLUENZA 11/11/2014, 11/11/2015, 11/18/2016, 11/15/2017   • Influenza Quadrivalent Preservative Free 3 years and older IM 11/11/2015, 11/18/2016   • Influenza Split High Dose Preservative Free IM 10/23/2013, 11/11/2014, 11/15/2017   • Influenza, high dose seasonal 0 7 mL 10/10/2018, 11/12/2019, 10/29/2020, 10/29/2021   • Influenza, seasonal, injectable, preservative free 12/20/2012   • Pneumococcal Conjugate 13-Valent 11/11/2014   • Pneumococcal Polysaccharide PPV23 10/29/2020   • Zoster 06/02/2015      Health Maintenance:         Topic Date Due   • Hepatitis C Screening  Never done   • DXA SCAN  03/04/2024         Topic Date Due   • COVID-19 Vaccine (4 - Booster for Pfizer series) 03/19/2022   • Influenza Vaccine (1) 09/01/2022      Medicare Screening Tests and Risk Assessments:     Chun Neri is here for her Subsequent Wellness visit  Health Risk Assessment:   Patient rates overall health as good  Patient feels that their physical health rating is same  Patient is satisfied with their life  Eyesight was rated as same  Hearing was rated as same  Patient feels that their emotional and mental health rating is same  Patients states they are never, rarely angry  Patient states they are sometimes unusually tired/fatigued  Pain experienced in the last 7 days has been none  Patient states that she has experienced no weight loss or gain in last 6 months  Depression Screening:   PHQ-2 Score: 0      Fall Risk Screening: In the past year, patient has experienced: no history of falling in past year      Urinary Incontinence Screening:   Patient has not leaked urine accidently in the last six months  Home Safety:  Patient does not have trouble with stairs inside or outside of their home  Patient has working smoke alarms and has working carbon monoxide detector  Home safety hazards include: none  Nutrition:   Current diet is Regular  Medications:   Patient is currently taking over-the-counter supplements   OTC medications include: see medication list  Patient is able to manage medications  Activities of Daily Living (ADLs)/Instrumental Activities of Daily Living (IADLs):   Walk and transfer into and out of bed and chair?: Yes  Dress and groom yourself?: Yes    Bathe or shower yourself?: Yes    Feed yourself? Yes  Do your laundry/housekeeping?: Yes  Manage your money, pay your bills and track your expenses?: Yes  Make your own meals?: Yes    Do your own shopping?: Yes    Previous Hospitalizations:   Any hospitalizations or ED visits within the last 12 months?: No      Advance Care Planning:   Living will: No      Cognitive Screening:   Provider or family/friend/caregiver concerned regarding cognition?: No    PREVENTIVE SCREENINGS      Cardiovascular Screening:    General: History Lipid Disorder and Screening Current      Diabetes Screening:     General: Screening Current      Colorectal Cancer Screening:     General: Screening Current      Breast Cancer Screening:     General: Screening Current      Cervical Cancer Screening:    General: Screening Not Indicated      Osteoporosis Screening:    General: Screening Current      Lung Cancer Screening:     General: Screening Current    Screening, Brief Intervention, and Referral to Treatment (SBIRT)    Screening  Typical number of drinks in a day: 0  Typical number of drinks in a week: 0  Interpretation: Low risk drinking behavior  Single Item Drug Screening:  How often have you used an illegal drug (including marijuana) or a prescription medication for non-medical reasons in the past year? never    Single Item Drug Screen Score: 0  Interpretation: Negative screen for possible drug use disorder    Brief Intervention  Alcohol & drug use screenings were reviewed  No concerns regarding substance use disorder identified       No exam data present     Physical Exam:     /80 (BP Location: Left arm, Patient Position: Sitting, Cuff Size: Standard)   Pulse 57   Temp 97 9 °F (36 6 °C) (Temporal)   Ht 5' 3" (1 6 m)   Wt 65 9 kg (145 lb 3 2 oz)   SpO2 94%   BMI 25 72 kg/m²     Physical Exam  Vitals and nursing note reviewed  Constitutional:       General: She is not in acute distress  Appearance: Normal appearance  She is well-developed  She is not diaphoretic  HENT:      Head: Normocephalic and atraumatic  Right Ear: Tympanic membrane, ear canal and external ear normal       Left Ear: Tympanic membrane, ear canal and external ear normal    Eyes:      Conjunctiva/sclera: Conjunctivae normal       Pupils: Pupils are equal, round, and reactive to light  Neck:      Thyroid: No thyromegaly  Vascular: No carotid bruit  Cardiovascular:      Rate and Rhythm: Normal rate and regular rhythm  Heart sounds: Normal heart sounds  No murmur heard  No friction rub  Pulmonary:      Effort: Pulmonary effort is normal  No respiratory distress  Breath sounds: Normal breath sounds  No wheezing  Abdominal:      General: Bowel sounds are normal  There is no distension  Palpations: Abdomen is soft  There is no mass  Tenderness: There is no abdominal tenderness  Musculoskeletal:      Right lower leg: No edema  Left lower leg: No edema  Lymphadenopathy:      Cervical: No cervical adenopathy  Skin:     General: Skin is warm and dry  Neurological:      General: No focal deficit present  Mental Status: She is alert and oriented to person, place, and time  Psychiatric:         Mood and Affect: Mood normal          Behavior: Behavior normal          Thought Content: Thought content normal          Judgment: Judgment normal       I discussed with her that she is a candidate for lung cancer CT screening  The following Shared Decision-Making points were covered:  1  Benefits of screening were discussed, including the rates of reduction in death from lung cancer and other causes    Harms of screening were reviewed, including false positive tests, radiation exposure levels, risks of invasive procedures, risks of complications of screening, and risk of overdiagnosis  2  I counseled on the importance of adherence to annual lung cancer LDCT screening, impact of co-morbidities, and ability or willingness to undergo diagnosis and treatment  3  I counseled on the importance of maintaining abstinence as a former smoker or was counseled on the importance of smoking cessation if a current smoker    Review of Eligibility Criteria: She meets all of the criteria for Lung Cancer Screening  · She is 69 y o  · She has 20 pack year tobacco history and is a current smoker or has quit within the past 15 years  · She presents no signs or symptoms of lung cancer    After discussion, the patient decided to elect lung cancer screening    Manuela Shone, PA-C

## 2022-11-01 ENCOUNTER — TELEPHONE (OUTPATIENT)
Dept: ADMINISTRATIVE | Facility: OTHER | Age: 77
End: 2022-11-01

## 2022-11-01 NOTE — TELEPHONE ENCOUNTER
----- Message from Jacklynn Kawasaki, LPN sent at [id number]/22/[id number] 12:10 PM EDT -----  Regarding: mammo  10/31/22 12:10 PM    Hello, our patient Dmitry Hoffman has had Mammogram completed/performed  Please assist in updating the patient chart by pulling the Care Everywhere (CE) document  The date of service is 9/26/2022       Thank you,  Jacklynn Kawasaki [facility]

## 2022-11-01 NOTE — TELEPHONE ENCOUNTER
----- Message from Blanca Vázquez, KEVIN sent at [id number]/60/[id number] 12:12 PM EDT -----  Regarding: pap  10/31/22 12:12 PM    Hello, our patient Jason Gonsalez has had Pap Smear (HPV) aka Cervical Cancer Screening completed/performed  Please assist in updating the patient chart by pulling the Care Everywhere (CE) document  The date of service is 10/19/2022       Thank you,  Blanca [facility]

## 2022-11-08 NOTE — TELEPHONE ENCOUNTER
Upon review of the In Basket request we were able to locate, review, and update the patient chart as requested for Mammogram Did not locate cervical cancer screening    Any additional questions or concerns should be emailed to the Practice Liaisons via the appropriate education email address, please do not reply via In Basket      Thank you  Wilder López

## 2022-11-10 ENCOUNTER — HOSPITAL ENCOUNTER (OUTPATIENT)
Dept: RADIOLOGY | Facility: MEDICAL CENTER | Age: 77
Discharge: HOME | End: 2022-11-10

## 2022-11-10 DIAGNOSIS — Z12.2 SCREENING FOR LUNG CANCER: ICD-10-CM

## 2022-12-12 DIAGNOSIS — J44.9 CHRONIC OBSTRUCTIVE PULMONARY DISEASE, UNSPECIFIED COPD TYPE (HCC): ICD-10-CM

## 2022-12-14 ENCOUNTER — APPOINTMENT (EMERGENCY)
Dept: CT IMAGING | Facility: HOSPITAL | Age: 77
End: 2022-12-14

## 2022-12-14 ENCOUNTER — APPOINTMENT (EMERGENCY)
Dept: RADIOLOGY | Facility: HOSPITAL | Age: 77
End: 2022-12-14

## 2022-12-14 ENCOUNTER — HOSPITAL ENCOUNTER (EMERGENCY)
Facility: HOSPITAL | Age: 77
Discharge: HOME | End: 2022-12-14
Attending: EMERGENCY MEDICINE

## 2022-12-14 VITALS
TEMPERATURE: 97.6 F | HEART RATE: 58 BPM | HEIGHT: 63 IN | WEIGHT: 145 LBS | SYSTOLIC BLOOD PRESSURE: 158 MMHG | DIASTOLIC BLOOD PRESSURE: 71 MMHG | OXYGEN SATURATION: 97 % | BODY MASS INDEX: 25.69 KG/M2 | RESPIRATION RATE: 20 BRPM

## 2022-12-14 DIAGNOSIS — R42 VERTIGO: Primary | ICD-10-CM

## 2022-12-14 LAB
ALBUMIN SERPL BCP-MCNC: 4.2 G/DL (ref 3.5–5)
ALP SERPL-CCNC: 58 U/L (ref 34–104)
ALT SERPL W P-5'-P-CCNC: 16 U/L (ref 7–52)
ANION GAP SERPL CALCULATED.3IONS-SCNC: 7 MMOL/L (ref 4–13)
APTT PPP: 28 SECONDS (ref 23–37)
AST SERPL W P-5'-P-CCNC: 17 U/L (ref 13–39)
ATRIAL RATE: 53 BPM
ATRIAL RATE: 94 BPM
BASOPHILS # BLD AUTO: 0.06 THOUSANDS/ÂΜL (ref 0–0.1)
BASOPHILS NFR BLD AUTO: 1 % (ref 0–1)
BILIRUB SERPL-MCNC: 0.51 MG/DL (ref 0.2–1)
BUN SERPL-MCNC: 17 MG/DL (ref 5–25)
CALCIUM SERPL-MCNC: 9.6 MG/DL (ref 8.4–10.2)
CARDIAC TROPONIN I PNL SERPL HS: 2 NG/L
CHLORIDE SERPL-SCNC: 101 MMOL/L (ref 96–108)
CO2 SERPL-SCNC: 30 MMOL/L (ref 21–32)
CREAT SERPL-MCNC: 0.85 MG/DL (ref 0.6–1.3)
EOSINOPHIL # BLD AUTO: 0.01 THOUSAND/ÂΜL (ref 0–0.61)
EOSINOPHIL NFR BLD AUTO: 0 % (ref 0–6)
ERYTHROCYTE [DISTWIDTH] IN BLOOD BY AUTOMATED COUNT: 11.9 % (ref 11.6–15.1)
GFR SERPL CREATININE-BSD FRML MDRD: 66 ML/MIN/1.73SQ M
GLUCOSE SERPL-MCNC: 131 MG/DL (ref 65–140)
HCT VFR BLD AUTO: 44.1 % (ref 34.8–46.1)
HGB BLD-MCNC: 14.6 G/DL (ref 11.5–15.4)
IMM GRANULOCYTES # BLD AUTO: 0.03 THOUSAND/UL (ref 0–0.2)
IMM GRANULOCYTES NFR BLD AUTO: 0 % (ref 0–2)
INR PPP: 0.97 (ref 0.84–1.19)
LYMPHOCYTES # BLD AUTO: 1.46 THOUSANDS/ÂΜL (ref 0.6–4.47)
LYMPHOCYTES NFR BLD AUTO: 15 % (ref 14–44)
MCH RBC QN AUTO: 30.1 PG (ref 26.8–34.3)
MCHC RBC AUTO-ENTMCNC: 33.1 G/DL (ref 31.4–37.4)
MCV RBC AUTO: 91 FL (ref 82–98)
MONOCYTES # BLD AUTO: 0.34 THOUSAND/ÂΜL (ref 0.17–1.22)
MONOCYTES NFR BLD AUTO: 3 % (ref 4–12)
NEUTROPHILS # BLD AUTO: 8.17 THOUSANDS/ÂΜL (ref 1.85–7.62)
NEUTS SEG NFR BLD AUTO: 81 % (ref 43–75)
NRBC BLD AUTO-RTO: 0 /100 WBCS
P AXIS: 59 DEGREES
P AXIS: 83 DEGREES
PLATELET # BLD AUTO: 262 THOUSANDS/UL (ref 149–390)
PMV BLD AUTO: 9.1 FL (ref 8.9–12.7)
POTASSIUM SERPL-SCNC: 3.1 MMOL/L (ref 3.5–5.3)
PR INTERVAL: 152 MS
PR INTERVAL: 170 MS
PROT SERPL-MCNC: 7 G/DL (ref 6.4–8.4)
PROTHROMBIN TIME: 12.9 SECONDS (ref 11.6–14.5)
QRS AXIS: 102 DEGREES
QRS AXIS: 59 DEGREES
QRSD INTERVAL: 106 MS
QRSD INTERVAL: 98 MS
QT INTERVAL: 368 MS
QT INTERVAL: 466 MS
QTC INTERVAL: 437 MS
QTC INTERVAL: 460 MS
RBC # BLD AUTO: 4.85 MILLION/UL (ref 3.81–5.12)
SODIUM SERPL-SCNC: 138 MMOL/L (ref 135–147)
T WAVE AXIS: 77 DEGREES
T WAVE AXIS: 80 DEGREES
VENTRICULAR RATE: 53 BPM
VENTRICULAR RATE: 94 BPM
WBC # BLD AUTO: 10.07 THOUSAND/UL (ref 4.31–10.16)

## 2022-12-14 RX ORDER — MECLIZINE HYDROCHLORIDE 25 MG/1
25 TABLET ORAL 3 TIMES DAILY PRN
Qty: 12 TABLET | Refills: 0 | Status: SHIPPED | OUTPATIENT
Start: 2022-12-14

## 2022-12-14 RX ORDER — MECLIZINE HCL 12.5 MG/1
25 TABLET ORAL ONCE
Status: COMPLETED | OUTPATIENT
Start: 2022-12-14 | End: 2022-12-14

## 2022-12-14 RX ORDER — POTASSIUM CHLORIDE 20 MEQ/1
40 TABLET, EXTENDED RELEASE ORAL ONCE
Status: COMPLETED | OUTPATIENT
Start: 2022-12-14 | End: 2022-12-14

## 2022-12-14 RX ADMIN — MECLIZINE 25 MG: 12.5 TABLET ORAL at 07:32

## 2022-12-14 RX ADMIN — POTASSIUM CHLORIDE 40 MEQ: 1500 TABLET, EXTENDED RELEASE ORAL at 08:10

## 2022-12-14 NOTE — ED PROVIDER NOTES
History  Chief Complaint   Patient presents with   • Dizziness     Felt dizzy waking up this morning around 0530, has x1 episode of vomitting (clear and green) and nausea     69-year-old female presents emergency department complaining of dizziness  Patient notes that she woke up early this morning to go to the bathroom and when she changed positioning she got acutely dizzy noting that the whole room was spinning  The patient notes that she has had intermittent symptoms of this as well over the last couple of hours  The patient was able to ambulate into the emergency room and sit on the stretcher where she also had a brief bout of vertigo  The patient is asymptomatic at the time of my evaluation  Patient complains of a mild headache  Prior to Admission Medications   Prescriptions Last Dose Informant Patient Reported? Taking?    Calcium Carb-Cholecalciferol (CALCIUM 1000 + D PO)   Yes No   Sig: Take by mouth   Multiple Vitamins-Minerals (MULTI FOR HER) TABS   Yes No   Sig: Take by mouth   Omega-3 Fatty Acids (FISH OIL) 645 MG CAPS   Yes No   Sig: Take by mouth   albuterol (ProAir HFA) 90 mcg/act inhaler   No No   Sig: Inhale 2 puffs every 4 (four) hours as needed for shortness of breath   coenzyme Q-10 100 MG capsule   Yes No   Sig: Take by mouth   lovastatin (MEVACOR) 20 mg tablet   No No   Sig: Take 1 tablet (20 mg total) by mouth daily   tiotropium (SPIRIVA) 18 mcg inhalation capsule   No No   Sig: Place 1 capsule (18 mcg total) into inhaler and inhale daily   triamcinolone (KENALOG) 0 1 % oral topical paste   Yes No   Sig: Apply to the mouth or throat As needed    Patient not taking: No sig reported   triamterene-hydrochlorothiazide (MAXZIDE-25) 37 5-25 mg per tablet   No No   Sig: Take 1 tablet by mouth daily      Facility-Administered Medications: None       Past Medical History:   Diagnosis Date   • Hyperlipemia    • Hypertension    • Multiple benign polyps of large intestine        Past Surgical History:   Procedure Laterality Date   • ARTHROSCOPY KNEE Left    • INCISIONAL BREAST BIOPSY     • SKIN LESION EXCISION      lips benign   • VARICOSE VEIN SURGERY      ligation       Family History   Problem Relation Age of Onset   • Hypertension Mother    • Pancreatic cancer Mother    • Other Father         MVA with significant injury   • Breast cancer Sister    • Lung cancer Brother      I have reviewed and agree with the history as documented  E-Cigarette/Vaping   • E-Cigarette Use Never User      E-Cigarette/Vaping Substances   • Nicotine No    • THC No    • Flavoring No    • Unknown No      Social History     Tobacco Use   • Smoking status: Former     Packs/day: 1 00     Years: 35 00     Pack years: 35 00     Types: Cigarettes     Quit date:      Years since quittin 9   • Smokeless tobacco: Never   Vaping Use   • Vaping Use: Never used   Substance Use Topics   • Alcohol use: No     Comment: social drinker as per [employer]   • Drug use: No       Review of Systems   Constitutional: Negative for chills and fever  HENT: Negative for ear pain and sore throat  Eyes: Negative for pain and visual disturbance  Respiratory: Negative for cough and shortness of breath  Cardiovascular: Negative for chest pain and palpitations  Gastrointestinal: Negative for abdominal pain and vomiting  Genitourinary: Negative for dysuria and hematuria  Musculoskeletal: Negative for arthralgias and back pain  Skin: Negative for color change and rash  Neurological: Positive for dizziness and headaches  Negative for seizures and syncope  All other systems reviewed and are negative  Physical Exam  Physical Exam  Vitals and nursing note reviewed  Constitutional:       General: She is not in acute distress  Appearance: Normal appearance  She is well-developed  HENT:      Head: Normocephalic and atraumatic        Right Ear: External ear normal       Left Ear: External ear normal       Nose: Nose normal       Mouth/Throat:      Mouth: Mucous membranes are moist    Eyes:      Conjunctiva/sclera: Conjunctivae normal    Cardiovascular:      Rate and Rhythm: Normal rate and regular rhythm  Pulses: Normal pulses  Heart sounds: Normal heart sounds  No murmur heard  Pulmonary:      Effort: Pulmonary effort is normal  No respiratory distress  Breath sounds: Normal breath sounds  Abdominal:      General: Bowel sounds are normal       Palpations: Abdomen is soft  Tenderness: There is no abdominal tenderness  Musculoskeletal:         General: No swelling or deformity  Cervical back: Neck supple  Skin:     General: Skin is warm and dry  Capillary Refill: Capillary refill takes less than 2 seconds  Neurological:      General: No focal deficit present  Mental Status: She is alert and oriented to person, place, and time  Mental status is at baseline           Vital Signs  ED Triage Vitals [12/14/22 0706]   Temperature Pulse Respirations Blood Pressure SpO2   97 6 °F (36 4 °C) 57 18 163/58 94 %      Temp Source Heart Rate Source Patient Position - Orthostatic VS BP Location FiO2 (%)   Temporal Monitor Lying Left arm --      Pain Score       No Pain           Vitals:    12/14/22 0706 12/14/22 0915 12/14/22 0930   BP: 163/58 161/71 158/71   Pulse: 57 61 58   Patient Position - Orthostatic VS: Lying  Sitting         Visual Acuity  Visual Acuity    Flowsheet Row Most Recent Value   L Pupil Size (mm) 3   R Pupil Size (mm) 3          ED Medications  Medications   meclizine (ANTIVERT) tablet 25 mg (25 mg Oral Given 12/14/22 0732)   potassium chloride (K-DUR,KLOR-CON) CR tablet 40 mEq (40 mEq Oral Given 12/14/22 0810)       Diagnostic Studies  Results Reviewed     Procedure Component Value Units Date/Time    HS Troponin 0hr (reflex protocol) [[id number]]  (Normal) Collected: 12/14/22 0733    Lab Status: Final result Specimen: Blood from Arm, Right Updated: 12/14/22 0805     hs TnI 0hr 2 ng/L     Comprehensive metabolic panel [[id number]]  (Abnormal) Collected: 12/14/22 0733    Lab Status: Final result Specimen: Blood from Arm, Right Updated: 12/14/22 0757     Sodium 138 mmol/L      Potassium 3 1 mmol/L      Chloride 101 mmol/L      CO2 30 mmol/L      ANION GAP 7 mmol/L      BUN 17 mg/dL      Creatinine 0 85 mg/dL      Glucose 131 mg/dL      Calcium 9 6 mg/dL      AST 17 U/L      ALT 16 U/L      Alkaline Phosphatase 58 U/L      Total Protein 7 0 g/dL      Albumin 4 2 g/dL      Total Bilirubin 0 51 mg/dL      eGFR 66 ml/min/1 73sq m     Narrative:      [place] guidelines for Chronic Kidney Disease (CKD):   •  Stage 1 with normal or high GFR (GFR > 90 mL/min/1 73 square meters)  •  Stage 2 Mild CKD (GFR = 60-89 mL/min/1 73 square meters)  •  Stage 3A Moderate CKD (GFR = 45-59 mL/min/1 73 square meters)  •  Stage 3B Moderate CKD (GFR = 30-44 mL/min/1 73 square meters)  •  Stage 4 Severe CKD (GFR = 15-29 mL/min/1 73 square meters)  •  Stage 5 End Stage CKD (GFR <15 mL/min/1 73 square meters)  Note: GFR calculation is accurate only with a steady state creatinine    Protime-INR [[id number]]  (Normal) Collected: 12/14/22 0733    Lab Status: Final result Specimen: Blood from Arm, Right Updated: 12/14/22 0753     Protime 12 9 seconds      INR 0 97    APTT [[id number]]  (Normal) Collected: 12/14/22 0733    Lab Status: Final result Specimen: Blood from Arm, Right Updated: 12/14/22 0753     PTT 28 seconds     CBC and differential [[id number]]  (Abnormal) Collected: 12/14/22 0733    Lab Status: Final result Specimen: Blood from Arm, Right Updated: 12/14/22 0745     WBC 10 07 Thousand/uL      RBC 4 85 Million/uL      Hemoglobin 14 6 g/dL      Hematocrit 44 1 %      MCV 91 fL      MCH 30 1 pg      MCHC 33 1 g/dL      RDW 11 9 %      MPV 9 1 fL      Platelets 652 Thousands/uL      nRBC 0 /100 WBCs      Neutrophils Relative 81 %      Immat GRANS % 0 %      Lymphocytes Relative 15 % Monocytes Relative 3 %      Eosinophils Relative 0 %      Basophils Relative 1 %      Neutrophils Absolute 8 17 Thousands/µL      Immature Grans Absolute 0 03 Thousand/uL      Lymphocytes Absolute 1 46 Thousands/µL      Monocytes Absolute 0 34 Thousand/µL      Eosinophils Absolute 0 01 Thousand/µL      Basophils Absolute 0 06 Thousands/µL                  XR chest 1 view portable   Final Result by Stevan Estrada MD (12/14 7619)   Emphysema      No acute cardiopulmonary disease  Findings are stable            Workstation performed: [id number]         CT head without contrast   ED Interpretation by Lucrecia Clemons DO (12/14 0911)   No definitive acute pulmonary pathology  Lungs have a COPD appearance  Final Result by Travon Newberry MD (12/14 4673)      No acute intracranial abnormality  Workstation performed: [id number]                    Procedures  ECG 12 Lead Documentation Only    Date/Time: 12/14/2022 7:25 AM  Performed by: Lucrecia Clemons DO  Authorized by: Lucrecia Clemons DO     ECG reviewed by me, the ED Provider: yes    Patient location:  ED  Comments:      EKG shows a sinus bradycardia 53 beats a minute with a normal axis  There is no definitive acute ST or T wave changes appreciated  There is nonspecific T wave changes in the anterior septal leads             ED Course  ED Course as of 12/15/22 0954   Wed Dec 14, 2022   1479 After meclizine - shows patient's symptoms are improved  Will discharge on prescription and refer back to primary care  1004 Ambulated patient in the emergency room without any issue  Patient notes she is feeling improved  Will discharge                               SBIRT 20yo+    Flowsheet Row Most Recent Value   SBIRT (23 yo +)    In order to provide better care to our patients, we are screening all of our patients for alcohol and drug use  Would it be okay to ask you these screening questions?  Yes Filed at: 12/14/2022 0730   Initial Alcohol Screen: US AUDIT-C     1  How often do you have a drink containing alcohol? 0 Filed at: 12/14/2022 0730   2  How many drinks containing alcohol do you have on a typical day you are drinking? 0 Filed at: 12/14/2022 0730   3b  FEMALE Any Age, or MALE 65+: How often do you have 4 or more drinks on one occassion? 0 Filed at: 12/14/2022 0730   Audit-C Score 0 Filed at: 12/14/2022 0730   NIKITA: How many times in the past year have you    Used an illegal drug or used a prescription medication for non-medical reasons? Never Filed at: 12/14/2022 0730                    MDM    Disposition  Final diagnoses:   Vertigo     Time reflects when diagnosis was documented in both MDM as applicable and the Disposition within this note     Time User Action Codes Description Comment    12/14/2022  9:49 AM Wendi Walker Add [R42] Vertigo       ED Disposition     ED Disposition   Discharge    Condition   Stable    Date/Time   Wed Dec 14, 2022  9:48 AM    Comment   Yas Hurd discharge to home/self care  Follow-up Information     Follow up With Specialties Details Why Contact Info    Ailin Costa PA-C Family Medicine, Physician Assistant On 12/19/2022  [address]   [id number] [place] [address]  [phone]            Discharge Medication List as of 12/14/2022 10:03 AM      START taking these medications    Details   meclizine (ANTIVERT) 25 mg tablet Take 1 tablet (25 mg total) by mouth 3 (three) times a day as needed for dizziness, Starting Wed 12/14/2022, Normal         CONTINUE these medications which have NOT CHANGED    Details   albuterol (ProAir HFA) 90 mcg/act inhaler Inhale 2 puffs every 4 (four) hours as needed for shortness of breath, Starting Tue 7/5/2022, Normal      Calcium Carb-Cholecalciferol (CALCIUM 1000 + D PO) Take by mouth, Historical Med      coenzyme Q-10 100 MG capsule Take by mouth, Historical Med      lovastatin (MEVACOR) 20 mg tablet Take 1 tablet (20 mg total) by mouth daily, Starting Mon 8/1/2022, Normal      Multiple Vitamins-Minerals (MULTI FOR HER) TABS Take by mouth, Historical Med      Omega-3 Fatty Acids (FISH OIL) 645 MG CAPS Take by mouth, Historical Med      tiotropium (SPIRIVA) 18 mcg inhalation capsule Place 1 capsule (18 mcg total) into inhaler and inhale daily, Starting Mon 12/12/2022, Normal      triamcinolone (KENALOG) 0 1 % oral topical paste Apply to the mouth or throat As needed , Starting Mon 8/25/2014, Historical Med      triamterene-hydrochlorothiazide (MAXZIDE-25) 37 5-25 mg per tablet Take 1 tablet by mouth daily, Starting Mon 8/1/2022, Normal             No discharge procedures on file      PDMP Review     None          ED Provider  Electronically Signed by           Francis Barragan DO  12/15/22 2844

## 2022-12-14 NOTE — DISCHARGE INSTRUCTIONS
Return to the ER for any new, concerning, or worsening issues  Take meclizine as prescribed for symptoms  Do not drive or operate any heavy machinery for the next 2 to 4 days  Follow-up with your family doctor in 2 to 4 days for repeat evaluation

## 2022-12-20 ENCOUNTER — APPOINTMENT (OUTPATIENT)
Dept: LAB | Facility: MEDICAL CENTER | Age: 77
End: 2022-12-20

## 2022-12-20 ENCOUNTER — OFFICE VISIT (OUTPATIENT)
Dept: FAMILY MEDICINE CLINIC | Facility: CLINIC | Age: 77
End: 2022-12-20

## 2022-12-20 VITALS
TEMPERATURE: 98 F | WEIGHT: 145.8 LBS | OXYGEN SATURATION: 96 % | BODY MASS INDEX: 25.83 KG/M2 | HEART RATE: 78 BPM | SYSTOLIC BLOOD PRESSURE: 168 MMHG | HEIGHT: 63 IN | DIASTOLIC BLOOD PRESSURE: 88 MMHG

## 2022-12-20 DIAGNOSIS — E87.6 HYPOKALEMIA: ICD-10-CM

## 2022-12-20 DIAGNOSIS — R42 VERTIGO: Primary | ICD-10-CM

## 2022-12-20 LAB
ANION GAP SERPL CALCULATED.3IONS-SCNC: 5 MMOL/L (ref 4–13)
CHLORIDE SERPL-SCNC: 102 MMOL/L (ref 96–108)
CO2 SERPL-SCNC: 32 MMOL/L (ref 21–32)
POTASSIUM SERPL-SCNC: 4.1 MMOL/L (ref 3.5–5.3)
SODIUM SERPL-SCNC: 139 MMOL/L (ref 135–147)

## 2022-12-20 NOTE — PROGRESS NOTES
Assessment/Plan:     Diagnoses and all orders for this visit:    Vertigo  Comments: This has resolved    Hypokalemia  Comments:  Electrolytes  Orders:  -     Electrolyte panel          Subjective:      Patient ID: Aung Barajas is a 69 y o  female  Presents to the office for emergency room follow-up  Patient went to the emergency room with an acute episode of vertigo  Room spinning and nausea  Included a CAT scan of her brain which was normal   Her chest x-ray was normal   Reviewed showed low potassium  Its the episode resolved today  She had no need to take the meclizine        The following portions of the patient's history were reviewed and updated as appropriate:   She   Patient Active Problem List    Diagnosis Date Noted   • Allergic rhinitis 06/01/2022   • Atypical ductal hyperplasia of breast 01/09/2019   • Fibrocystic disease of breast 01/09/2019   • [facility] 10/10/2018   • Polyp of sigmoid colon 04/16/2018   • Osteopenia 11/18/2016   • Family history of malignant neoplasm of breast 07/15/2016   • Cardiac murmur 02/17/2016   • Benign essential hypertension 06/11/2012   • Hyperlipidemia 06/11/2012     Current Outpatient Medications   Medication Sig Dispense Refill   • albuterol (ProAir HFA) 90 mcg/act inhaler Inhale 2 puffs every 4 (four) hours as needed for shortness of breath 24 g 1   • Calcium Carb-Cholecalciferol (CALCIUM 1000 + D PO) Take by mouth     • coenzyme Q-10 100 MG capsule Take by mouth     • lovastatin (MEVACOR) 20 mg tablet Take 1 tablet (20 mg total) by mouth daily 90 tablet 1   • meclizine (ANTIVERT) 25 mg tablet Take 1 tablet (25 mg total) by mouth 3 (three) times a day as needed for dizziness 12 tablet 0   • Multiple Vitamins-Minerals (MULTI FOR HER) TABS Take by mouth     • Omega-3 Fatty Acids (FISH OIL) 645 MG CAPS Take by mouth     • tiotropium (SPIRIVA) 18 mcg inhalation capsule Place 1 capsule (18 mcg total) into inhaler and inhale daily 90 capsule 1   • triamcinolone (KENALOG) 0 1 % oral topical paste Apply to the mouth or throat As needed  (Patient not taking: No sig reported)     • triamterene-hydrochlorothiazide (MAXZIDE-25) 37 5-25 mg per tablet Take 1 tablet by mouth daily 90 tablet 1     No current facility-administered medications for this visit  She has No Known Allergies       Review of Systems   HENT: Negative for ear pain  Eyes: Negative for visual disturbance  Neurological: Negative for dizziness and headaches  Objective:        Physical Exam  Vitals and nursing note reviewed  Constitutional:       General: She is not in acute distress  Appearance: Normal appearance  She is not ill-appearing  HENT:      Head: Normocephalic and atraumatic  Right Ear: Tympanic membrane, ear canal and external ear normal       Left Ear: Tympanic membrane, ear canal and external ear normal    Eyes:      Extraocular Movements: Extraocular movements intact  Conjunctiva/sclera: Conjunctivae normal       Pupils: Pupils are equal, round, and reactive to light  Neck:      Vascular: No carotid bruit  Cardiovascular:      Rate and Rhythm: Normal rate and regular rhythm  Heart sounds: Normal heart sounds  No murmur heard  Pulmonary:      Effort: Pulmonary effort is normal       Breath sounds: Normal breath sounds  Lymphadenopathy:      Cervical: No cervical adenopathy  Skin:     General: Skin is warm and dry  Neurological:      General: No focal deficit present  Mental Status: She is alert and oriented to person, place, and time  Psychiatric:         Mood and Affect: Mood normal          Behavior: Behavior normal          Thought Content:  Thought content normal          Judgment: Judgment normal

## 2023-01-17 DIAGNOSIS — E78.5 HYPERLIPIDEMIA, UNSPECIFIED HYPERLIPIDEMIA TYPE: ICD-10-CM

## 2023-01-17 DIAGNOSIS — I10 ESSENTIAL HYPERTENSION: ICD-10-CM

## 2023-01-17 RX ORDER — LOVASTATIN 20 MG/1
20 TABLET ORAL DAILY
Qty: 90 TABLET | Refills: 1 | Status: SHIPPED | OUTPATIENT
Start: 2023-01-17

## 2023-01-17 RX ORDER — TRIAMTERENE AND HYDROCHLOROTHIAZIDE 37.5; 25 MG/1; MG/1
1 TABLET ORAL DAILY
Qty: 90 TABLET | Refills: 1 | Status: SHIPPED | OUTPATIENT
Start: 2023-01-17

## 2023-03-10 DIAGNOSIS — J44.9 CHRONIC OBSTRUCTIVE PULMONARY DISEASE, UNSPECIFIED COPD TYPE (HCC): ICD-10-CM

## 2023-05-01 ENCOUNTER — OFFICE VISIT (OUTPATIENT)
Dept: FAMILY MEDICINE CLINIC | Facility: CLINIC | Age: 78
End: 2023-05-01

## 2023-05-01 VITALS
WEIGHT: 150 LBS | BODY MASS INDEX: 26.58 KG/M2 | DIASTOLIC BLOOD PRESSURE: 78 MMHG | OXYGEN SATURATION: 96 % | TEMPERATURE: 97.3 F | HEIGHT: 63 IN | SYSTOLIC BLOOD PRESSURE: 124 MMHG | HEART RATE: 67 BPM

## 2023-05-01 DIAGNOSIS — I10 BENIGN ESSENTIAL HYPERTENSION: ICD-10-CM

## 2023-05-01 DIAGNOSIS — J44.9 CHRONIC OBSTRUCTIVE PULMONARY DISEASE, UNSPECIFIED COPD TYPE (HCC): Primary | ICD-10-CM

## 2023-05-01 DIAGNOSIS — E78.5 HYPERLIPIDEMIA, UNSPECIFIED HYPERLIPIDEMIA TYPE: ICD-10-CM

## 2023-05-01 NOTE — PROGRESS NOTES
BMI Counseling: Body mass index is 26 57 kg/m²  The BMI is above normal  Nutrition recommendations include decreasing portion sizes, encouraging healthy choices of fruits and vegetables and moderation in carbohydrate intake  Exercise recommendations include exercising 3-5 times per week  No pharmacotherapy was ordered  Rationale for BMI follow-up plan is due to patient being overweight or obese  Depression Screening and Follow-up Plan: Patient was screened for depression during today's encounter  They screened negative with a PHQ-2 score of 2  Assessment/Plan:     Diagnoses and all orders for this visit:    Chronic obstructive pulmonary disease, unspecified COPD type ([facility] )  Comments:  Currently stable continue Spiriva daily and as needed ProAir  Orders:  -     CBC and differential; Future    Benign essential hypertension  Comments:  Pressure is at goal continue Maxide daily  Orders:  -     Comprehensive metabolic panel; Future    Hyperlipidemia, unspecified hyperlipidemia type  Comments:  Continue statin therapy and low-fat diet 's are acceptable  Orders:  -     Lipid panel; Future          Subjective:      Patient ID: Cher Baker is a 69 y o  female  Patient presents in the office for follow-up chronic conditions  Patient had COPD  This is currently stable  He is on Spiriva daily and as needed ProAir  Hypertension she is on Maxide  She also has hyperlipidemia she is on lovastatin 20 mg   CMP is normal   Panel is acceptable  Osteopenia she is on calcium vitamin D supplements    Her DEXA scan is up-to-date      The following portions of the patient's history were reviewed and updated as appropriate:   She   Patient Active Problem List    Diagnosis Date Noted    Allergic rhinitis 06/01/2022    Atypical ductal hyperplasia of breast 01/09/2019    Fibrocystic disease of breast 01/09/2019    COPD (chronic obstructive pulmonary disease) ([facility] ) 10/10/2018    Polyp of sigmoid colon 04/16/2018    Osteopenia 11/18/2016    Family history of malignant neoplasm of breast 07/15/2016    Cardiac murmur 02/17/2016    Benign essential hypertension 06/11/2012    Hyperlipidemia 06/11/2012     Current Outpatient Medications   Medication Sig Dispense Refill    albuterol (ProAir HFA) 90 mcg/act inhaler Inhale 2 puffs every 4 (four) hours as needed for shortness of breath 24 g 1    Calcium Carb-Cholecalciferol (CALCIUM 1000 + D PO) Take by mouth      coenzyme Q-10 100 MG capsule Take by mouth      lovastatin (MEVACOR) 20 mg tablet Take 1 tablet (20 mg total) by mouth daily 90 tablet 1    meclizine (ANTIVERT) 25 mg tablet Take 1 tablet (25 mg total) by mouth 3 (three) times a day as needed for dizziness 12 tablet 0    Multiple Vitamins-Minerals (MULTI FOR HER) TABS Take by mouth      Omega-3 Fatty Acids (FISH OIL) 645 MG CAPS Take by mouth      tiotropium (SPIRIVA) 18 mcg inhalation capsule Place 1 capsule (18 mcg total) into inhaler and inhale daily 90 capsule 1    triamterene-hydrochlorothiazide (MAXZIDE-25) 37 5-25 mg per tablet Take 1 tablet by mouth daily 90 tablet 1    triamcinolone (KENALOG) 0 1 % oral topical paste Apply to the mouth or throat As needed  (Patient not taking: Reported on 6/1/2022)       No current facility-administered medications for this visit  She has No Known Allergies       Review of Systems   Constitutional: Negative for activity change and unexpected weight change  HENT: Negative for ear pain and sore throat  Eyes: Negative for visual disturbance  Respiratory: Negative for cough, shortness of breath and wheezing  Cardiovascular: Negative for chest pain and leg swelling  Gastrointestinal: Negative for abdominal pain, blood in stool, constipation, diarrhea, nausea and vomiting  Genitourinary: Negative for difficulty urinating  Musculoskeletal: Negative for arthralgias and myalgias  Skin: Negative for rash     Neurological: Negative for dizziness, syncope, light-headedness and headaches  Psychiatric/Behavioral: Negative for self-injury, sleep disturbance and suicidal ideas  The patient is not nervous/anxious  Objective:        Physical Exam  Vitals and nursing note reviewed  Constitutional:       General: She is not in acute distress  Appearance: Normal appearance  She is well-developed  She is not diaphoretic  HENT:      Head: Normocephalic and atraumatic  Right Ear: Tympanic membrane, ear canal and external ear normal       Left Ear: Tympanic membrane, ear canal and external ear normal       Mouth/Throat:      Pharynx: No posterior oropharyngeal erythema  Eyes:      Conjunctiva/sclera: Conjunctivae normal       Pupils: Pupils are equal, round, and reactive to light  Neck:      Thyroid: No thyromegaly  Vascular: No carotid bruit  Cardiovascular:      Rate and Rhythm: Normal rate and regular rhythm  Heart sounds: Murmur heard  Systolic murmur is present with a grade of 1/6  No friction rub  No gallop  Comments: 1/6   Right  Intercostal  space  Pulmonary:      Effort: Pulmonary effort is normal  No respiratory distress  Breath sounds: Normal breath sounds  No wheezing  Abdominal:      General: Bowel sounds are normal  There is no distension  Palpations: Abdomen is soft  There is no mass  Tenderness: There is no abdominal tenderness  Musculoskeletal:      Right lower leg: No edema  Left lower leg: No edema  Lymphadenopathy:      Cervical: No cervical adenopathy  Skin:     General: Skin is warm and dry  Findings: No erythema or rash  Neurological:      General: No focal deficit present  Mental Status: She is alert and oriented to person, place, and time  Psychiatric:         Mood and Affect: Mood normal          Behavior: Behavior normal          Thought Content:  Thought content normal          Judgment: Judgment normal

## 2023-07-18 DIAGNOSIS — I10 ESSENTIAL HYPERTENSION: ICD-10-CM

## 2023-07-18 DIAGNOSIS — E78.5 HYPERLIPIDEMIA, UNSPECIFIED HYPERLIPIDEMIA TYPE: ICD-10-CM

## 2023-07-18 RX ORDER — LOVASTATIN 20 MG/1
20 TABLET ORAL DAILY
Qty: 90 TABLET | Refills: 1 | Status: SHIPPED | OUTPATIENT
Start: 2023-07-18

## 2023-07-18 RX ORDER — TRIAMTERENE AND HYDROCHLOROTHIAZIDE 37.5; 25 MG/1; MG/1
1 TABLET ORAL DAILY
Qty: 90 TABLET | Refills: 1 | Status: SHIPPED | OUTPATIENT
Start: 2023-07-18

## 2023-09-25 DIAGNOSIS — J44.9 CHRONIC OBSTRUCTIVE PULMONARY DISEASE, UNSPECIFIED COPD TYPE (HCC): ICD-10-CM

## 2023-09-25 RX ORDER — TIOTROPIUM BROMIDE 18 UG/1
18 CAPSULE ORAL; RESPIRATORY (INHALATION) DAILY
Qty: 90 CAPSULE | Refills: 1 | Status: SHIPPED | OUTPATIENT
Start: 2023-09-25

## 2023-09-25 NOTE — TELEPHONE ENCOUNTER
Reason for call:   [x] Refill   [] Prior Auth  [] Other:     Office:   [x] PCP/Provider -  Micaela San  [] Speciality/Provider -     Medication: Spiriva    Dose/Frequency: 18mcg    Quantity: 90    Pharmacy: [facility]    Does the patient have enough for 3 days?    [x] Yes   [] No - Send as HP to POD

## 2023-10-16 LAB
ALBUMIN SERPL-MCNC: 4.2 G/DL (ref 3.6–5.1)
ALBUMIN/GLOB SERPL: 1.7 (CALC) (ref 1–2.5)
ALP SERPL-CCNC: 68 U/L (ref 37–153)
ALT SERPL-CCNC: 20 U/L (ref 6–29)
AST SERPL-CCNC: 21 U/L (ref 10–35)
BASOPHILS # BLD AUTO: 80 CELLS/UL (ref 0–200)
BASOPHILS NFR BLD AUTO: 1.7 %
BILIRUB SERPL-MCNC: 0.6 MG/DL (ref 0.2–1.2)
BUN SERPL-MCNC: 14 MG/DL (ref 7–25)
BUN/CREAT SERPL: ABNORMAL (CALC) (ref 6–22)
CALCIUM SERPL-MCNC: 9.7 MG/DL (ref 8.6–10.4)
CHLORIDE SERPL-SCNC: 102 MMOL/L (ref 98–110)
CHOLEST SERPL-MCNC: 226 MG/DL
CHOLEST/HDLC SERPL: 4.8 (CALC)
CO2 SERPL-SCNC: 33 MMOL/L (ref 20–32)
CREAT SERPL-MCNC: 0.86 MG/DL (ref 0.6–1)
EOSINOPHIL # BLD AUTO: 71 CELLS/UL (ref 15–500)
EOSINOPHIL NFR BLD AUTO: 1.5 %
ERYTHROCYTE [DISTWIDTH] IN BLOOD BY AUTOMATED COUNT: 11.8 % (ref 11–15)
GFR/BSA.PRED SERPLBLD CYS-BASED-ARV: 70 ML/MIN/1.73M2
GLOBULIN SER CALC-MCNC: 2.5 G/DL (CALC) (ref 1.9–3.7)
GLUCOSE SERPL-MCNC: 97 MG/DL (ref 65–99)
HCT VFR BLD AUTO: 44.5 % (ref 35–45)
HDLC SERPL-MCNC: 47 MG/DL
HGB BLD-MCNC: 14.8 G/DL (ref 11.7–15.5)
LDLC SERPL CALC-MCNC: 148 MG/DL (CALC)
LYMPHOCYTES # BLD AUTO: 1622 CELLS/UL (ref 850–3900)
LYMPHOCYTES NFR BLD AUTO: 34.5 %
MCH RBC QN AUTO: 29.9 PG (ref 27–33)
MCHC RBC AUTO-ENTMCNC: 33.3 G/DL (ref 32–36)
MCV RBC AUTO: 89.9 FL (ref 80–100)
MONOCYTES # BLD AUTO: 353 CELLS/UL (ref 200–950)
MONOCYTES NFR BLD AUTO: 7.5 %
NEUTROPHILS # BLD AUTO: 2576 CELLS/UL (ref 1500–7800)
NEUTROPHILS NFR BLD AUTO: 54.8 %
NONHDLC SERPL-MCNC: 179 MG/DL (CALC)
PLATELET # BLD AUTO: 287 THOUSAND/UL (ref 140–400)
PMV BLD REES-ECKER: 9.7 FL (ref 7.5–12.5)
POTASSIUM SERPL-SCNC: 3.8 MMOL/L (ref 3.5–5.3)
PROT SERPL-MCNC: 6.7 G/DL (ref 6.1–8.1)
RBC # BLD AUTO: 4.95 MILLION/UL (ref 3.8–5.1)
SODIUM SERPL-SCNC: 141 MMOL/L (ref 135–146)
TRIGL SERPL-MCNC: 178 MG/DL
WBC # BLD AUTO: 4.7 THOUSAND/UL (ref 3.8–10.8)

## 2023-10-30 ENCOUNTER — RA CDI HCC (OUTPATIENT)
Dept: OTHER | Facility: HOSPITAL | Age: 78
End: 2023-10-30

## 2023-10-30 NOTE — PROGRESS NOTES
[address] [facility] coding opportunities       Chart reviewed, no opportunity found: [address]        Patients Insurance     Medicare Insurance: [employer]

## 2023-11-01 ENCOUNTER — OFFICE VISIT (OUTPATIENT)
Dept: FAMILY MEDICINE CLINIC | Facility: CLINIC | Age: 78
End: 2023-11-01
Payer: COMMERCIAL

## 2023-11-01 VITALS
OXYGEN SATURATION: 96 % | TEMPERATURE: 97.6 F | WEIGHT: 147.4 LBS | DIASTOLIC BLOOD PRESSURE: 80 MMHG | SYSTOLIC BLOOD PRESSURE: 144 MMHG | HEIGHT: 63 IN | BODY MASS INDEX: 26.12 KG/M2 | HEART RATE: 68 BPM

## 2023-11-01 DIAGNOSIS — Z12.2 SCREENING FOR LUNG CANCER: ICD-10-CM

## 2023-11-01 DIAGNOSIS — M85.80 OSTEOPENIA, UNSPECIFIED LOCATION: ICD-10-CM

## 2023-11-01 DIAGNOSIS — Z23 ENCOUNTER FOR IMMUNIZATION: ICD-10-CM

## 2023-11-01 DIAGNOSIS — J44.9 CHRONIC OBSTRUCTIVE PULMONARY DISEASE, UNSPECIFIED COPD TYPE (HCC): ICD-10-CM

## 2023-11-01 DIAGNOSIS — Z12.31 ENCOUNTER FOR SCREENING MAMMOGRAM FOR MALIGNANT NEOPLASM OF BREAST: ICD-10-CM

## 2023-11-01 DIAGNOSIS — I10 BENIGN ESSENTIAL HYPERTENSION: ICD-10-CM

## 2023-11-01 DIAGNOSIS — E78.5 HYPERLIPIDEMIA, UNSPECIFIED HYPERLIPIDEMIA TYPE: ICD-10-CM

## 2023-11-01 DIAGNOSIS — Z00.00 MEDICARE ANNUAL WELLNESS VISIT, SUBSEQUENT: Primary | ICD-10-CM

## 2023-11-01 PROCEDURE — G0008 ADMIN INFLUENZA VIRUS VAC: HCPCS

## 2023-11-01 PROCEDURE — 90662 IIV NO PRSV INCREASED AG IM: CPT

## 2023-11-01 PROCEDURE — G0439 PPPS, SUBSEQ VISIT: HCPCS | Performed by: PHYSICIAN ASSISTANT

## 2023-11-01 PROCEDURE — 99214 OFFICE O/P EST MOD 30 MIN: CPT | Performed by: PHYSICIAN ASSISTANT

## 2023-11-01 RX ORDER — LOVASTATIN 40 MG/1
40 TABLET ORAL DAILY
Qty: 90 TABLET | Refills: 1 | Status: SHIPPED | OUTPATIENT
Start: 2023-11-01

## 2023-11-01 NOTE — PROGRESS NOTES
I discussed with her that she is a candidate for lung cancer CT screening. The following Shared Decision-Making points were covered:  Benefits of screening were discussed, including the rates of reduction in death from lung cancer and other causes. Harms of screening were reviewed, including false positive tests, radiation exposure levels, risks of invasive procedures, risks of complications of screening, and risk of overdiagnosis. I counseled on the importance of adherence to annual lung cancer LDCT screening, impact of co-morbidities, and ability or willingness to undergo diagnosis and treatment. I counseled on the importance of maintaining abstinence as a former smoker or was counseled on the importance of smoking cessation if a current smoker    Review of Eligibility Criteria: She meets all of the criteria for Lung Cancer Screening. She is 69 y.o. She has 20 pack year tobacco history and is a current smoker or has quit within the past 15 years  She presents no signs or symptoms of lung cancer    After discussion, the patient decided to elect lung cancer screening. Assessment and Plan:     Problem List Items Addressed This Visit          Respiratory    COPD (chronic obstructive pulmonary disease) ([address])       Cardiovascular and Mediastinum    Benign essential hypertension       Musculoskeletal and Integument    Osteopenia    Relevant Orders    DXA bone density spine hip and pelvis       Other    Hyperlipidemia    Relevant Medications    lovastatin (MEVACOR) 40 MG tablet     Other Visit Diagnoses       Medicare annual wellness visit, subsequent    -  Primary    Screening for lung cancer        Relevant Orders    CT lung screening program    Encounter for screening mammogram for malignant neoplasm of breast        Relevant Orders    Mammo screening bilateral w 3d & cad            Depression Screening and Follow-up Plan: Patient was screened for depression during today's encounter.  They screened negative with a PHQ-2 score of 0. Preventive health issues were discussed with patient, and age appropriate screening tests were ordered as noted in patient's After Visit Summary. Personalized health advice and appropriate referrals for health education or preventive services given if needed, as noted in patient's After Visit Summary. History of Present Illness:     Patient presents for a Medicare Wellness Visit    Presents in the office for follow-up chronic conditions. Patient has hypertension. Is well controlled with Maxide. Has hyperlipidemia on lovastatin 20 mg. Cholesterol is elevated at 226 with LDL at 148. Triglycerides elevated at 178. We will increase patient's lovastatin to 40 mg. She also has COPD she is on Spiriva daily and ProAir as needed. Her breathing is stable. She is due for lung CT screening this month. Her osteopenia she is on calcium vitamin D supplements. Her DEXA scan is due in March. Mammogram is due now. Discussed vaccines. Flu vaccine given today       Patient Care Team:  Cm Aviles PA-C as PCP - General (Family Medicine)  Shivam Tyson MD Ardeth Beckmann, PA-C Jenni Delaine, MD     Review of Systems:     Review of Systems   Constitutional:  Negative for activity change and unexpected weight change. HENT:  Negative for ear pain and sore throat. Eyes:  Negative for visual disturbance. Respiratory:  Negative for cough, shortness of breath and wheezing. Cardiovascular:  Negative for chest pain and leg swelling. Gastrointestinal:  Negative for abdominal pain, blood in stool, constipation, diarrhea, nausea and vomiting. Genitourinary:  Negative for difficulty urinating. Musculoskeletal:  Negative for arthralgias and myalgias. Skin:  Negative for rash. Neurological:  Negative for dizziness, syncope, light-headedness and headaches. Psychiatric/Behavioral:  Negative for self-injury, sleep disturbance and suicidal ideas.  The patient is not nervous/anxious. Problem List:     Patient Active Problem List   Diagnosis    Benign essential hypertension    Cardiac murmur    Hyperlipidemia    Osteopenia    Polyp of sigmoid colon    COPD (chronic obstructive pulmonary disease) (HCC)    Atypical ductal hyperplasia of breast    Family history of malignant neoplasm of breast    Fibrocystic disease of breast    Allergic rhinitis      Past Medical and Surgical History:     Past Medical History:   Diagnosis Date    Hyperlipemia     Hypertension     Multiple benign polyps of large intestine      Past Surgical History:   Procedure Laterality Date    ARTHROSCOPY KNEE Left     INCISIONAL BREAST BIOPSY      SKIN LESION EXCISION      lips benign    VARICOSE VEIN SURGERY      ligation      Family History:     Family History   Problem Relation Age of Onset    Hypertension Mother     Pancreatic cancer Mother     Other Father         MVA with significant injury    Breast cancer Sister     Lung cancer Brother       Social History:     Social History     Socioeconomic History    Marital status:       Spouse name: None    Number of children: None    Years of education: None    Highest education level: None   Occupational History    None   Tobacco Use    Smoking status: Former     Packs/day: 1.00     Years: 35.00     Total pack years: 35.00     Types: Cigarettes     Start date: 66     Quit date:      Years since quittin.8     Passive exposure: Past    Smokeless tobacco: Never   Vaping Use    Vaping Use: Never used   Substance and Sexual Activity    Alcohol use: Not Currently     Comment: social drinker as per [employer]    Drug use: No    Sexual activity: None   Other Topics Concern    None   Social History Narrative    None     Social Determinants of Health     Financial Resource Strain: Low Risk  (2023)    Overall Financial Resource Strain (CARDIA)     Difficulty of Paying Living Expenses: Not hard at all   Food Insecurity: Not on file   Transportation Needs: No Transportation Needs (11/1/2023)    PRAPARE - Transportation     Lack of Transportation (Medical): No     Lack of Transportation (Non-Medical): No   Physical Activity: Not on file   Stress: Not on file   Social Connections: Not on file   Intimate Partner Violence: Not on file   Housing Stability: Not on file      Medications and Allergies:     Current Outpatient Medications   Medication Sig Dispense Refill    albuterol (ProAir HFA) 90 mcg/act inhaler Inhale 2 puffs every 4 (four) hours as needed for shortness of breath 24 g 1    Calcium Carb-Cholecalciferol (CALCIUM 1000 + D PO) Take by mouth      coenzyme Q-10 100 MG capsule Take by mouth      lovastatin (MEVACOR) 40 MG tablet Take 1 tablet (40 mg total) by mouth daily 90 tablet 1    meclizine (ANTIVERT) 25 mg tablet Take 1 tablet (25 mg total) by mouth 3 (three) times a day as needed for dizziness 12 tablet 0    Multiple Vitamins-Minerals (MULTI FOR HER) TABS Take by mouth      Omega-3 Fatty Acids (FISH OIL) 645 MG CAPS Take by mouth      tiotropium (SPIRIVA) 18 mcg inhalation capsule Place 1 capsule (18 mcg total) into inhaler and inhale daily 90 capsule 1    triamterene-hydrochlorothiazide (MAXZIDE-25) 37.5-25 mg per tablet Take 1 tablet by mouth daily 90 tablet 1    triamcinolone (KENALOG) 0.1 % oral topical paste Apply to the mouth or throat As needed  (Patient not taking: Reported on 6/1/2022)       No current facility-administered medications for this visit.      No Known Allergies   Immunizations:     Immunization History   Administered Date(s) Administered    COVID-19 PFIZER VACCINE 0.3 ML IM 02/15/2021, 03/06/2021, 11/19/2021    INFLUENZA 11/11/2014, 11/11/2015, 11/18/2016, 11/15/2017    Influenza Quadrivalent Preservative Free 3 years and older IM 11/11/2015, 11/18/2016    Influenza Split High Dose Preservative Free IM 10/23/2013, 11/11/2014, 11/15/2017    Influenza, high dose seasonal 0.7 mL 10/10/2018, 11/12/2019, 10/29/2020, 10/29/2021, 10/31/2022    Influenza, seasonal, injectable, preservative free 12/20/2012    Pneumococcal Conjugate 13-Valent 11/11/2014    Pneumococcal Polysaccharide PPV23 10/29/2020    Zoster 06/02/2015      Health Maintenance:         Topic Date Due    Hepatitis C Screening  Never done    Lung Cancer Screening  11/10/2023    DXA SCAN  03/04/2024    Colorectal Cancer Screening  Discontinued         Topic Date Due    COVID-19 Vaccine (4 - Pfizer series) 01/14/2022    Influenza Vaccine (1) 09/01/2023      Medicare Screening Tests and Risk Assessments:     Shawn Ghosh is here for her Subsequent Wellness visit. Health Risk Assessment:   Patient rates overall health as good. Patient feels that their physical health rating is same. Patient is satisfied with their life. Eyesight was rated as same. Hearing was rated as same. Patient feels that their emotional and mental health rating is much better. Patients states they are never, rarely angry. Patient states they are sometimes unusually tired/fatigued. Pain experienced in the last 7 days has been none. Patient states that she has experienced no weight loss or gain in last 6 months. Depression Screening:   PHQ-2 Score: 0      Fall Risk Screening: In the past year, patient has experienced: no history of falling in past year      Urinary Incontinence Screening:   Patient has not leaked urine accidently in the last six months. Home Safety:  Patient does not have trouble with stairs inside or outside of their home. Patient has working smoke alarms and has working carbon monoxide detector. Home safety hazards include: none. Nutrition:   Current diet is Regular and Limited junk food. Medications:   Patient is currently taking over-the-counter supplements. OTC medications include: see medication list. Patient is able to manage medications.      Activities of Daily Living (ADLs)/Instrumental Activities of Daily Living (IADLs):   Walk and transfer into and out of bed and chair?: Yes  Dress and groom yourself?: Yes    Bathe or shower yourself?: Yes    Feed yourself? Yes  Do your laundry/housekeeping?: Yes  Manage your money, pay your bills and track your expenses?: Yes  Make your own meals?: Yes    Do your own shopping?: Yes    Previous Hospitalizations:   Any hospitalizations or ED visits within the last 12 months?: No      Advance Care Planning:   Living will: No    Durable POA for healthcare: Yes    Advanced directive: No    ACP document given: Yes      Cognitive Screening:   Provider or family/friend/caregiver concerned regarding cognition?: No    PREVENTIVE SCREENINGS      Cardiovascular Screening:    General: History Lipid Disorder and Screening Current      Diabetes Screening:     General: Screening Current      Colorectal Cancer Screening:       Due for: Colonoscopy - Low Risk      Breast Cancer Screening:     General: Screening Current    Due for: Mammogram        Cervical Cancer Screening:    General: Screening Not Indicated      Osteoporosis Screening:    General: Screening Current      Lung Cancer Screening:     General: Screening Current    Due for: Low Dose CT (LDCT)      Hepatitis C Screening:      Hep C Screening Accepted: No     Screening, Brief Intervention, and Referral to Treatment (SBIRT)    Screening  Typical number of drinks in a day: 0  Typical number of drinks in a week: 0  Interpretation: Low risk drinking behavior. AUDIT-C Screenin) How often did you have a drink containing alcohol in the past year? never  2) How many drinks did you have on a typical day when you were drinking in the past year? 0  3) How often did you have 6 or more drinks on one occasion in the past year? never    AUDIT-C Score: 0  Interpretation: Score 0-2 (female): Negative screen for alcohol misuse    Single Item Drug Screening:  How often have you used an illegal drug (including marijuana) or a prescription medication for non-medical reasons in the past year? never    Single Item Drug Screen Score: 0  Interpretation: Negative screen for possible drug use disorder    Brief Intervention  Alcohol & drug use screenings were reviewed. No concerns regarding substance use disorder identified. No results found. Physical Exam:     /80 (BP Location: Right arm, Patient Position: Sitting, Cuff Size: Standard)   Pulse 68   Temp 97.6 °F (36.4 °C) (Temporal)   Ht 5' 3" (1.6 m)   Wt 66.9 kg (147 lb 6.4 oz)   SpO2 96%   BMI 26.11 kg/m²     Physical Exam  Vitals and nursing note reviewed. Constitutional:       General: She is not in acute distress. Appearance: Normal appearance. She is well-developed. She is not diaphoretic. HENT:      Head: Normocephalic and atraumatic. Right Ear: Tympanic membrane, ear canal and external ear normal.      Left Ear: Tympanic membrane, ear canal and external ear normal.      Mouth/Throat:      Pharynx: No posterior oropharyngeal erythema. Eyes:      Conjunctiva/sclera: Conjunctivae normal.      Pupils: Pupils are equal, round, and reactive to light. Neck:      Thyroid: No thyromegaly. Vascular: No carotid bruit. Cardiovascular:      Rate and Rhythm: Normal rate and regular rhythm. Heart sounds: Normal heart sounds. No murmur heard. No friction rub. Pulmonary:      Effort: Pulmonary effort is normal. No respiratory distress. Breath sounds: Normal breath sounds. No wheezing. Abdominal:      General: Bowel sounds are normal. There is no distension. Palpations: Abdomen is soft. There is no mass. Tenderness: There is no abdominal tenderness. Musculoskeletal:      Cervical back: Normal range of motion and neck supple. Right lower leg: No edema. Left lower leg: No edema. Lymphadenopathy:      Cervical: No cervical adenopathy. Skin:     General: Skin is warm and dry. Neurological:      General: No focal deficit present.       Mental Status: She is alert and oriented to person, place, and time. Psychiatric:         Mood and Affect: Mood normal.         Behavior: Behavior normal.         Thought Content:  Thought content normal.         Judgment: Judgment normal.          Nilsa Street PA-C

## 2023-11-01 NOTE — PATIENT INSTRUCTIONS
Medicare Preventive Visit Patient Instructions  Thank you for completing your Welcome to Medicare Visit or Medicare Annual Wellness Visit today. Your next wellness visit will be due in one year (11/1/2024). The screening/preventive services that you may require over the next 5-10 years are detailed below. Some tests may not apply to you based off risk factors and/or age. Screening tests ordered at today's visit but not completed yet may show as past due. Also, please note that scanned in results may not display below. Preventive Screenings:  Service Recommendations Previous Testing/Comments   Colorectal Cancer Screening  * Colonoscopy    * Fecal Occult Blood Test (FOBT)/Fecal Immunochemical Test (FIT)  * Fecal DNA/Cologuard Test  * Flexible Sigmoidoscopy Age: 43-73 years old   Colonoscopy: every 10 years (may be performed more frequently if at higher risk)  OR  FOBT/FIT: every 1 year  OR  Cologuard: every 3 years  OR  Sigmoidoscopy: every 5 years  Screening may be recommended earlier than age 39 if at higher risk for colorectal cancer. Also, an individualized decision between you and your healthcare provider will decide whether screening between the ages of 77-80 would be appropriate. Colonoscopy: 08/07/2018  FOBT/FIT: Not on file  Cologuard: Not on file  Sigmoidoscopy: Not on file          Breast Cancer Screening Age: 36 years old  Frequency: every 1-2 years  Not required if history of left and right mastectomy Mammogram: 09/26/2022    Screening Current   Cervical Cancer Screening Between the ages of 21-29, pap smear recommended once every 3 years. Between the ages of 32-69, can perform pap smear with HPV co-testing every 5 years.    Recommendations may differ for women with a history of total hysterectomy, cervical cancer, or abnormal pap smears in past. Pap Smear: Not on file    Screening Not Indicated   Hepatitis C Screening Once for adults born between [address] [place] [address]  More frequently in patients at high risk for Hepatitis C Hep C Antibody: Not on file        Diabetes Screening 1-2 times per year if you're at risk for diabetes or have pre-diabetes Fasting glucose: 73 mg/dL (10/7/2019)  A1C: No results in last 5 years (No results in last 5 years)  Screening Current   Cholesterol Screening Once every 5 years if you don't have a lipid disorder. May order more often based on risk factors. Lipid panel: 10/16/2023    Screening Not Indicated  History Lipid Disorder     Other Preventive Screenings Covered by Medicare:  Abdominal Aortic Aneurysm (AAA) Screening: covered once if your at risk. You're considered to be at risk if you have a family history of AAA. Lung Cancer Screening: covers low dose CT scan once per year if you meet all of the following conditions: (1) Age 48-67; (2) No signs or symptoms of lung cancer; (3) Current smoker or have quit smoking within the last 15 years; (4) You have a tobacco smoking history of at least 20 pack years (packs per day multiplied by number of years you smoked); (5) You get a written order from a healthcare provider. Glaucoma Screening: covered annually if you're considered high risk: (1) You have diabetes OR (2) Family history of glaucoma OR (3)  aged 48 and older OR (3)  American aged 72 and older  Osteoporosis Screening: covered every 2 years if you meet one of the following conditions: (1) You're estrogen deficient and at risk for osteoporosis based off medical history and other findings; (2) Have a vertebral abnormality; (3) On glucocorticoid therapy for more than 3 months; (4) Have primary hyperparathyroidism; (5) On osteoporosis medications and need to assess response to drug therapy. Last bone density test (DXA Scan): 03/04/2022. HIV Screening: covered annually if you're between the age of 14-79. Also covered annually if you are younger than 13 and older than 72 with risk factors for HIV infection.  For pregnant patients, it is covered up to 3 times per pregnancy. Immunizations:  Immunization Recommendations   Influenza Vaccine Annual influenza vaccination during flu season is recommended for all persons aged >= 6 months who do not have contraindications   Pneumococcal Vaccine   * Pneumococcal conjugate vaccine = PCV13 (Prevnar 13), PCV15 (Vaxneuvance), PCV20 (Prevnar 20)  * Pneumococcal polysaccharide vaccine = PPSV23 (Pneumovax) Adults 75-46 yo with certain risk factors or if 69+ yo  If never received any pneumonia vaccine: recommend Prevnar 20 (PCV20)  Give PCV20 if previously received 1 dose of PCV13 or PPSV23   Hepatitis B Vaccine 3 dose series if at intermediate or high risk (ex: diabetes, end stage renal disease, liver disease)   Respiratory syncytial virus (RSV) Vaccine - COVERED BY MEDICARE PART D  * RSVPreF3 (Arexvy) CDC recommends that adults 61years of age and older may receive a single dose of RSV vaccine using shared clinical decision-making (SCDM)   Tetanus (Td) Vaccine - COST NOT COVERED BY MEDICARE PART B Following completion of primary series, a booster dose should be given every 10 years to maintain immunity against tetanus. Td may also be given as tetanus wound prophylaxis. Tdap Vaccine - COST NOT COVERED BY MEDICARE PART B Recommended at least once for all adults. For pregnant patients, recommended with each pregnancy. Shingles Vaccine (Shingrix) - COST NOT COVERED BY MEDICARE PART B  2 shot series recommended in those 19 years and older who have or will have weakened immune systems or those 50 years and older     Health Maintenance Due:      Topic Date Due   • Hepatitis C Screening  Never done   • Lung Cancer Screening  11/10/2023   • DXA SCAN  03/04/2024   • Colorectal Cancer Screening  Discontinued     Immunizations Due:      Topic Date Due   • COVID-19 Vaccine (4 - Pfizer series) 01/14/2022   • Influenza Vaccine (1) 09/01/2023     Advance Directives   What are advance directives?   Advance directives are legal documents that state your wishes and plans for medical care. These plans are made ahead of time in case you lose your ability to make decisions for yourself. Advance directives can apply to any medical decision, such as the treatments you want, and if you want to donate organs. What are the types of advance directives? There are many types of advance directives, and each state has rules about how to use them. You may choose a combination of any of the following:  Living will: This is a written record of the treatment you want. You can also choose which treatments you do not want, which to limit, and which to stop at a certain time. This includes surgery, medicine, IV fluid, and tube feedings. Durable power of  for [facility]): This is a written record that states who you want to make healthcare choices for you when you are unable to make them for yourself. This person, called a proxy, is usually a family member or a friend. You may choose more than 1 proxy. Do not resuscitate (DNR) order:  A DNR order is used in case your heart stops beating or you stop breathing. It is a request not to have certain forms of treatment, such as CPR. A DNR order may be included in other types of advance directives. Medical directive: This covers the care that you want if you are in a coma, near death, or unable to make decisions for yourself. You can list the treatments you want for each condition. Treatment may include pain medicine, surgery, blood transfusions, dialysis, IV or tube feedings, and a ventilator (breathing machine). Values history: This document has questions about your views, beliefs, and how you feel and think about life. This information can help others choose the care that you would choose. Why are advance directives important? An advance directive helps you control your care. Although spoken wishes may be used, it is better to have your wishes written down.  Spoken wishes can be misunderstood, or not followed. Treatments may be given even if you do not want them. An advance directive may make it easier for your family to make difficult choices about your care. Weight Management   Why it is important to manage your weight:  Being overweight increases your risk of health conditions such as heart disease, high blood pressure, type 2 diabetes, and certain types of cancer. It can also increase your risk for osteoarthritis, sleep apnea, and other respiratory problems. Aim for a slow, steady weight loss. Even a small amount of weight loss can lower your risk of health problems. How to lose weight safely:  A safe and healthy way to lose weight is to eat fewer calories and get regular exercise. You can lose up about 1 pound a week by decreasing the number of calories you eat by 500 calories each day. Healthy meal plan for weight management:  A healthy meal plan includes a variety of foods, contains fewer calories, and helps you stay healthy. A healthy meal plan includes the following:  Eat whole-grain foods more often. A healthy meal plan should contain fiber. Fiber is the part of grains, fruits, and vegetables that is not broken down by your body. Whole-grain foods are healthy and provide extra fiber in your diet. Some examples of whole-grain foods are whole-wheat breads and pastas, oatmeal, brown rice, and bulgur. Eat a variety of vegetables every day. Include dark, leafy greens such as spinach, kale, sonu greens, and mustard greens. Eat yellow and orange vegetables such as carrots, sweet potatoes, and winter squash. Eat a variety of fruits every day. Choose fresh or canned fruit (canned in its own juice or light syrup) instead of juice. Fruit juice has very little or no fiber. Eat low-fat dairy foods. Drink fat-free (skim) milk or 1% milk. Eat fat-free yogurt and low-fat cottage cheese. Try low-fat cheeses such as mozzarella and other reduced-fat cheeses.   Choose meat and other protein foods that are low in fat. Choose beans or other legumes such as split peas or lentils. Choose fish, skinless poultry (chicken or turkey), or lean cuts of red meat (beef or pork). Before you cook meat or poultry, cut off any visible fat. Use less fat and oil. Try baking foods instead of frying them. Add less fat, such as margarine, sour cream, regular salad dressing and mayonnaise to foods. Eat fewer high-fat foods. Some examples of high-fat foods include french fries, doughnuts, ice cream, and cakes. Eat fewer sweets. Limit foods and drinks that are high in sugar. This includes candy, cookies, regular soda, and sweetened drinks. Exercise:  Exercise at least 30 minutes per day on most days of the week. Some examples of exercise include walking, biking, dancing, and swimming. You can also fit in more physical activity by taking the stairs instead of the elevator or parking farther away from stores. Ask your healthcare provider about the best exercise plan for you. © Copyright [employer] 2018 Information is for End User's use only and may not be sold, redistributed or otherwise used for commercial purposes.  All illustrations and images included in CareNotes® are the copyrighted property of A.D.A.M., Inc. or  [employer]

## 2023-11-10 ENCOUNTER — HOSPITAL ENCOUNTER (OUTPATIENT)
Dept: RADIOLOGY | Facility: MEDICAL CENTER | Age: 78
Discharge: HOME | End: 2023-11-10
Payer: COMMERCIAL

## 2023-11-10 DIAGNOSIS — Z12.2 SCREENING FOR LUNG CANCER: ICD-10-CM

## 2023-11-10 PROCEDURE — 71271 CT THORAX LUNG CANCER SCR C-: CPT

## 2023-11-29 ENCOUNTER — OFFICE VISIT (OUTPATIENT)
Dept: GASTROENTEROLOGY | Facility: CLINIC | Age: 78
End: 2023-11-29
Payer: COMMERCIAL

## 2023-11-29 VITALS
WEIGHT: 147 LBS | DIASTOLIC BLOOD PRESSURE: 88 MMHG | HEART RATE: 66 BPM | BODY MASS INDEX: 26.05 KG/M2 | HEIGHT: 63 IN | SYSTOLIC BLOOD PRESSURE: 150 MMHG

## 2023-11-29 DIAGNOSIS — Z86.010 HISTORY OF COLON POLYPS: Primary | ICD-10-CM

## 2023-11-29 PROCEDURE — 99203 OFFICE O/P NEW LOW 30 MIN: CPT | Performed by: INTERNAL MEDICINE

## 2023-11-29 NOTE — PROGRESS NOTES
[facility] - Outpatient Consultation  Malen Imus 69 y.o. female MRN: [id number]  Encounter: [id number]        ASSESSMENT AND PLAN:       Diagnoses and all orders for this visit:    History of colon polyps  We discussed the pros and cons of continued surveillance based on her age and health status. She is otherwise healthy, so further colonoscopy is not unreasonable, though she would prefer to hold off on this.        ______________________________________________________________________    HPI: Magnus Manrique has a history of prior colonic polyps, both adenomas as well as hyperplastic. Most recent colonoscopy was in 2018 with no adenomas at that time. No family history of colon cancer. She has had no symptoms, namely no abdominal pain, change in bowel habit, blood in her stool, unexplained weight loss      REVIEW OF SYSTEMS:    Review of Systems   Constitutional: Negative for fever and weight loss. Musculoskeletal:  Negative for myalgias. Gastrointestinal:  Positive for abdominal pain. Negative for anorexia, constipation, diarrhea, flatus, hematochezia, melena, nausea and vomiting. Genitourinary:  Negative for dysuria, frequency and hematuria. Neurological:  Negative for headaches.     Answers submitted by the patient for this visit:  Abdominal Pain Questionnaire (Submitted on 11/22/2023)  Chief Complaint: Abdominal pain  Progression since onset: resolved  arthralgias: No  belching: No      Historical Information   Past Medical History:   Diagnosis Date    Hyperlipemia     Hypertension     Multiple benign polyps of large intestine      Past Surgical History:   Procedure Laterality Date    ARTHROSCOPY KNEE Left     INCISIONAL BREAST BIOPSY      SKIN LESION EXCISION      lips benign    TUBAL LIGATION  2/73    VARICOSE VEIN SURGERY      ligation     Social History   Social History     Substance and Sexual Activity   Alcohol Use Not Currently    Comment: social drinker as per [employer] Social History     Substance and Sexual Activity   Drug Use No     Social History     Tobacco Use   Smoking Status Former    Packs/day: 1.00    Years: 30.00    Total pack years: 30.00    Types: Cigarettes    Start date: 66    Quit date:     Years since quittin.9    Passive exposure: Past   Smokeless Tobacco Never     Family History   Problem Relation Age of Onset    Hypertension Mother     Pancreatic cancer Mother     Other Father         MVA with significant injury    Breast cancer Sister     Lung cancer Brother        Meds/Allergies       Current Outpatient Medications:     albuterol (ProAir HFA) 90 mcg/act inhaler    Calcium Carb-Cholecalciferol (CALCIUM 1000 + D PO)    coenzyme Q-10 100 MG capsule    lovastatin (MEVACOR) 40 MG tablet    meclizine (ANTIVERT) 25 mg tablet    Multiple Vitamins-Minerals (MULTI FOR HER) TABS    Omega-3 Fatty Acids (FISH OIL) 645 MG CAPS    tiotropium (SPIRIVA) 18 mcg inhalation capsule    triamterene-hydrochlorothiazide (MAXZIDE-25) 37.5-25 mg per tablet    triamcinolone (KENALOG) 0.1 % oral topical paste    No Known Allergies        Objective     Blood pressure 150/88, pulse 66, height 5' 3" (1.6 m), weight 66.7 kg (147 lb). Body mass index is 26.04 kg/m². PHYSICAL EXAM:      Physical Exam         Lab Results:   No visits with results within 1 Day(s) from this visit.    Latest known visit with results is:   Orders Only on 10/16/2023   Component Date Value    Total Cholesterol 10/16/2023 226 (H)     HDL 10/16/2023 47 (L)     Triglycerides 10/16/2023 178 (H)     LDL Calculated 10/16/2023 148 (H)     Chol HDLC Ratio 10/16/2023 4.8     Non-HDL Cholesterol 10/16/2023 179 (H)     Glucose, Random 10/16/2023 97     BUN 10/16/2023 14     Creatinine 10/16/2023 0.86     eGFR 10/16/2023 70     SL AMB BUN/CREATININE RA* 10/16/2023 SEE NOTE:     Sodium 10/16/2023 141     Potassium 10/16/2023 3.8     Chloride 10/16/2023 102     CO2 10/16/2023 33 (H)     Calcium 10/16/2023 9.7 Protein, Total 10/16/2023 6.7     Albumin 10/16/2023 4.2     Globulin 10/16/2023 2.5     Albumin/Globulin Ratio 10/16/2023 1.7     TOTAL BILIRUBIN 10/16/2023 0.6     Alkaline Phosphatase 10/16/2023 68     AST 10/16/2023 21     ALT 10/16/2023 20     White Blood Cell Count 10/16/2023 4.7     Red Blood Cell Count 10/16/2023 4.95     Hemoglobin 10/16/2023 14.8     HCT 10/16/2023 44.5     MCV 10/16/2023 89.9     MCH 10/16/2023 29.9     MCHC 10/16/2023 33.3     RDW 10/16/2023 11.8     Platelet Count 63/69/6859 287     SL AMB MPV 10/16/2023 9.7     Neutrophils (Absolute) 10/16/2023 2,576     Lymphocytes (Absolute) 10/16/2023 1,622     Monocytes (Absolute) 10/16/2023 353     Eosinophils (Absolute) 10/16/2023 71     Basophils ABS 10/16/2023 80     Neutrophils 10/16/2023 54.8     Lymphocytes 10/16/2023 34.5     Monocytes 10/16/2023 7.5     Eosinophils 10/16/2023 1.5     Basophils PCT 10/16/2023 1.7          Radiology Results:   CT lung screening program    Result Date: 11/16/2023  Narrative: CT CHEST LUNG CANCER SCREENING WITHOUT IV CONTRAST INDICATION:   Z12.2: Encounter for screening for malignant neoplasm of respiratory organs. COMPARISON: November 10, 2022 TECHNIQUE:  Unenhanced CT examination of the chest was performed utilizing a low dose protocol. Multiplanar 2D reformatted images were created from the source data. Radiation dose length product (DLP) for this visit:  119 mGy-cm . This examination, like all CT scans performed in the [facility], was performed utilizing techniques to minimize radiation dose exposure, including the use of iterative reconstruction and automated exposure control. FINDINGS: LUNGS: Bullous emphysematous changes more severe in the upper lobes and more severe on the right than the left, similar from previous examination. No suspicious pulmonary nodule. No consolidative or groundglass airspace opacity. No central tracheal or endobronchial lesion. PLEURA:  Unremarkable. HEART/GREAT VESSELS: Heart is unremarkable for patient's age. No thoracic aortic aneurysm. MEDIASTINUM AND TYRELL: Small sliding-type hiatal hernia. CHEST WALL AND LOWER NECK:  Unremarkable. VISUALIZED STRUCTURES IN THE UPPER ABDOMEN:  Unremarkable. OSSEOUS STRUCTURES:  No acute fracture or destructive osseous lesion. Exaggeration of thoracic kyphosis. T6-T7 disc space narrowing and degenerative osseous fusion. Thoracic degenerative changes. Impression: No suspicious pulmonary nodule. Pulmonary emphysema. Lung-RADS1, negative. Continued annual screening with LDCT in 12 months. Workstation performed: [id number]     Mammo screening bilateral w 3d & cad    Result Date: 11/6/2023  Narrative: This result has an attachment that is not available. HISTORY: Patient is 69years old and is seen for a screening mammogram of both breasts. No relevant medical history has been documented for this patient. Surgical history includes breast biopsy. Family medical history includes breast cancer in 4 relatives (maternal grandmother, paternal aunt, sister, sister). No relevant hormone history has been documented for this patient. FILMS COMPARED: The present examination has been compared to prior imaging studies. MAMMOGRAM FINDINGS: A minimum of two views were obtained. 3D tomosynthesis was performed. Computer-aided detection was utilized by the radiologist in the interpretation of this examination. The breasts have scattered areas of fibroglandular density. No suspicious masses, calcifications or other abnormalities are seen. Impression: Impression: There is no mammographic evidence of malignancy. BI-RADS Category Overall: 1 - Negative Follow Up Mamm in 1 Yr. is recommended.  5 Year Tyrer-Cuzick: 2.57 % 10 Year Tyrer-Cuzick: No Score Lifetime Tyrer-Cuzick: 4.23 % Based on the information provided by the patient, risk scores for breast cancer for 5 years, 10 years and lifetime was calculated using both breast density and family history. Patients should consult with their referring providers regarding the significance of the score, potential need for additional risk assessment and supplemental screening including whole breast ultrasound and MRI.  Workstation: [id number]

## 2023-12-18 DIAGNOSIS — I10 ESSENTIAL HYPERTENSION: ICD-10-CM

## 2023-12-18 RX ORDER — TRIAMTERENE AND HYDROCHLOROTHIAZIDE 37.5; 25 MG/1; MG/1
1 TABLET ORAL DAILY
Qty: 90 TABLET | Refills: 1 | Status: SHIPPED | OUTPATIENT
Start: 2023-12-18

## 2023-12-18 NOTE — TELEPHONE ENCOUNTER
Reason for call:   [x] Refill   [] Prior Auth  [] Other:     Office:   [x] PCP/Provider - Hina Howard  [] Specialty/Provider -     Medication:   Triamterene-hctz 37.5-25, 1qd, 90    Pharmacy: [facility]    Does the patient have enough for 3 days?   [x] Yes   [] No - Send as HP to POD

## 2024-01-25 ENCOUNTER — VBI (OUTPATIENT)
Dept: ADMINISTRATIVE | Facility: OTHER | Age: 79
End: 2024-01-25

## 2024-02-29 ENCOUNTER — TELEPHONE (OUTPATIENT)
Age: 79
End: 2024-02-29

## 2024-03-11 DIAGNOSIS — J44.9 CHRONIC OBSTRUCTIVE PULMONARY DISEASE, UNSPECIFIED COPD TYPE (HCC): ICD-10-CM

## 2024-03-11 RX ORDER — TIOTROPIUM BROMIDE 18 UG/1
18 CAPSULE ORAL; RESPIRATORY (INHALATION) DAILY
Qty: 90 CAPSULE | Refills: 1 | Status: SHIPPED | OUTPATIENT
Start: 2024-03-11

## 2024-03-11 NOTE — TELEPHONE ENCOUNTER
Reason for call:   [x] Refill   [] Prior Auth  [] Other:     Office:   [x] PCP/Provider - Hina Howard PA-C   [] Specialty/Provider -     Medication: tiotropium (SPIRIVA) 18 mcg inhalation capsule     Dose/Frequency: Place 1 capsule (18 mcg total) into inhaler and inhale daily     Quantity: 90 + 1 refill    Pharmacy: [facility] - [address]     Does the patient have enough for 3 days?   [x] Yes   [] No - Send as HP to POD

## 2024-04-02 ENCOUNTER — HOSPITAL ENCOUNTER (OUTPATIENT)
Dept: RADIOLOGY | Facility: MEDICAL CENTER | Age: 79
Discharge: HOME | End: 2024-04-02

## 2024-04-02 DIAGNOSIS — M85.80 OSTEOPENIA, UNSPECIFIED LOCATION: ICD-10-CM

## 2024-04-02 DIAGNOSIS — Z13.820 SCREENING FOR OSTEOPOROSIS: ICD-10-CM

## 2024-04-09 LAB
ALBUMIN SERPL-MCNC: 4.4 G/DL (ref 3.6–5.1)
ALBUMIN/GLOB SERPL: 1.8 (CALC) (ref 1–2.5)
ALP SERPL-CCNC: 72 U/L (ref 37–153)
ALT SERPL-CCNC: 18 U/L (ref 6–29)
AST SERPL-CCNC: 20 U/L (ref 10–35)
BASOPHILS # BLD AUTO: 69 CELLS/UL (ref 0–200)
BASOPHILS NFR BLD AUTO: 0.8 %
BILIRUB SERPL-MCNC: 0.6 MG/DL (ref 0.2–1.2)
BUN SERPL-MCNC: 18 MG/DL (ref 7–25)
BUN/CREAT SERPL: NORMAL (CALC) (ref 6–22)
CALCIUM SERPL-MCNC: 10.1 MG/DL (ref 8.6–10.4)
CHLORIDE SERPL-SCNC: 101 MMOL/L (ref 98–110)
CHOLEST SERPL-MCNC: 188 MG/DL
CHOLEST/HDLC SERPL: 4.1 (CALC)
CO2 SERPL-SCNC: 32 MMOL/L (ref 20–32)
CREAT SERPL-MCNC: 0.91 MG/DL (ref 0.6–1)
EOSINOPHIL # BLD AUTO: 77 CELLS/UL (ref 15–500)
EOSINOPHIL NFR BLD AUTO: 0.9 %
ERYTHROCYTE [DISTWIDTH] IN BLOOD BY AUTOMATED COUNT: 11.7 % (ref 11–15)
GFR/BSA.PRED SERPLBLD CYS-BASED-ARV: 65 ML/MIN/1.73M2
GLOBULIN SER CALC-MCNC: 2.5 G/DL (CALC) (ref 1.9–3.7)
GLUCOSE SERPL-MCNC: 97 MG/DL (ref 65–99)
HCT VFR BLD AUTO: 46 % (ref 35–45)
HDLC SERPL-MCNC: 46 MG/DL
HGB BLD-MCNC: 15.1 G/DL (ref 11.7–15.5)
LDLC SERPL CALC-MCNC: 113 MG/DL (CALC)
LYMPHOCYTES # BLD AUTO: 2030 CELLS/UL (ref 850–3900)
LYMPHOCYTES NFR BLD AUTO: 23.6 %
MCH RBC QN AUTO: 29.5 PG (ref 27–33)
MCHC RBC AUTO-ENTMCNC: 32.8 G/DL (ref 32–36)
MCV RBC AUTO: 89.8 FL (ref 80–100)
MONOCYTES # BLD AUTO: 482 CELLS/UL (ref 200–950)
MONOCYTES NFR BLD AUTO: 5.6 %
NEUTROPHILS # BLD AUTO: 5943 CELLS/UL (ref 1500–7800)
NEUTROPHILS NFR BLD AUTO: 69.1 %
NONHDLC SERPL-MCNC: 142 MG/DL (CALC)
PLATELET # BLD AUTO: 309 THOUSAND/UL (ref 140–400)
PMV BLD REES-ECKER: 9.8 FL (ref 7.5–12.5)
POTASSIUM SERPL-SCNC: 3.9 MMOL/L (ref 3.5–5.3)
PROT SERPL-MCNC: 6.9 G/DL (ref 6.1–8.1)
RBC # BLD AUTO: 5.12 MILLION/UL (ref 3.8–5.1)
SODIUM SERPL-SCNC: 142 MMOL/L (ref 135–146)
TRIGL SERPL-MCNC: 171 MG/DL
WBC # BLD AUTO: 8.6 THOUSAND/UL (ref 3.8–10.8)

## 2024-04-15 ENCOUNTER — HOSPITAL ENCOUNTER (OUTPATIENT)
Dept: RADIOLOGY | Facility: MEDICAL CENTER | Age: 79
Discharge: HOME | End: 2024-04-15
Payer: COMMERCIAL

## 2024-04-15 PROCEDURE — 77080 DXA BONE DENSITY AXIAL: CPT

## 2024-04-19 DIAGNOSIS — E78.5 HYPERLIPIDEMIA, UNSPECIFIED HYPERLIPIDEMIA TYPE: ICD-10-CM

## 2024-04-19 RX ORDER — LOVASTATIN 40 MG/1
40 TABLET ORAL DAILY
Qty: 90 TABLET | Refills: 1 | Status: SHIPPED | OUTPATIENT
Start: 2024-04-19

## 2024-04-25 ENCOUNTER — RA CDI HCC (OUTPATIENT)
Dept: OTHER | Facility: HOSPITAL | Age: 79
End: 2024-04-25

## 2024-05-01 ENCOUNTER — OFFICE VISIT (OUTPATIENT)
Dept: FAMILY MEDICINE CLINIC | Facility: CLINIC | Age: 79
End: 2024-05-01
Payer: COMMERCIAL

## 2024-05-01 VITALS
BODY MASS INDEX: 26.01 KG/M2 | DIASTOLIC BLOOD PRESSURE: 78 MMHG | WEIGHT: 146.8 LBS | SYSTOLIC BLOOD PRESSURE: 126 MMHG | TEMPERATURE: 97.6 F | HEART RATE: 66 BPM | OXYGEN SATURATION: 95 % | HEIGHT: 63 IN

## 2024-05-01 DIAGNOSIS — M40.204 KYPHOSIS OF THORACIC REGION, UNSPECIFIED KYPHOSIS TYPE: ICD-10-CM

## 2024-05-01 DIAGNOSIS — E78.5 HYPERLIPIDEMIA, UNSPECIFIED HYPERLIPIDEMIA TYPE: ICD-10-CM

## 2024-05-01 DIAGNOSIS — J44.9 CHRONIC OBSTRUCTIVE PULMONARY DISEASE, UNSPECIFIED COPD TYPE (HCC): ICD-10-CM

## 2024-05-01 DIAGNOSIS — I10 BENIGN ESSENTIAL HYPERTENSION: Primary | ICD-10-CM

## 2024-05-01 DIAGNOSIS — Z12.2 SCREENING FOR LUNG CANCER: ICD-10-CM

## 2024-05-01 DIAGNOSIS — Z12.31 ENCOUNTER FOR SCREENING MAMMOGRAM FOR MALIGNANT NEOPLASM OF BREAST: ICD-10-CM

## 2024-05-01 DIAGNOSIS — M85.80 OSTEOPENIA, UNSPECIFIED LOCATION: ICD-10-CM

## 2024-05-01 DIAGNOSIS — Z87.891 FORMER CIGARETTE SMOKER: ICD-10-CM

## 2024-05-01 PROCEDURE — G2211 COMPLEX E/M VISIT ADD ON: HCPCS | Performed by: PHYSICIAN ASSISTANT

## 2024-05-01 PROCEDURE — 99214 OFFICE O/P EST MOD 30 MIN: CPT | Performed by: PHYSICIAN ASSISTANT

## 2024-05-01 PROCEDURE — 3725F SCREEN DEPRESSION PERFORMED: CPT | Performed by: PHYSICIAN ASSISTANT

## 2024-05-01 PROCEDURE — 3074F SYST BP LT 130 MM HG: CPT | Performed by: PHYSICIAN ASSISTANT

## 2024-05-01 PROCEDURE — 3078F DIAST BP <80 MM HG: CPT | Performed by: PHYSICIAN ASSISTANT

## 2024-05-01 PROCEDURE — 1159F MED LIST DOCD IN RCRD: CPT | Performed by: PHYSICIAN ASSISTANT

## 2024-05-01 PROCEDURE — 1160F RVW MEDS BY RX/DR IN RCRD: CPT | Performed by: PHYSICIAN ASSISTANT

## 2024-05-01 RX ORDER — TIOTROPIUM BROMIDE 18 UG/1
18 CAPSULE ORAL; RESPIRATORY (INHALATION) DAILY
Qty: 90 CAPSULE | Refills: 1 | Status: SHIPPED | OUTPATIENT
Start: 2024-05-01

## 2024-05-01 RX ORDER — ALBUTEROL SULFATE 90 UG/1
2 AEROSOL, METERED RESPIRATORY (INHALATION) EVERY 4 HOURS PRN
Qty: 24 G | Refills: 1 | Status: SHIPPED | OUTPATIENT
Start: 2024-05-01

## 2024-05-01 NOTE — PROGRESS NOTES
Assessment/Plan:     Diagnoses and all orders for this visit:    Benign essential hypertension  Comments:  Pressure at goal continue current regimen  Orders:  -     CBC and differential  -     Comprehensive metabolic panel    Hyperlipidemia, unspecified hyperlipidemia type  Comments:  Is at goal continue statin therapy and low-fat diet  Orders:  -     Lipid panel    Chronic obstructive pulmonary disease, unspecified COPD type (HCC)  Comments:  Stable continue current regimen  Orders:  -     tiotropium (SPIRIVA) 18 mcg inhalation capsule; Place 1 capsule (18 mcg total) into inhaler and inhale daily  -     albuterol (ProAir HFA) 90 mcg/act inhaler; Inhale 2 puffs every 4 (four) hours as needed for shortness of breath  -     CBC and differential  -     CT chest wo contrast; Future    Osteopenia, unspecified location  Comments:  DEXA scan is up-to-date.  Continue calcium vitamin D supplements    Kyphosis of thoracic region, unspecified kyphosis type    Chronic obstructive pulmonary disease, unspecified COPD type (HCC)  Comments:  I  Orders:  -     tiotropium (SPIRIVA) 18 mcg inhalation capsule; Place 1 capsule (18 mcg total) into inhaler and inhale daily  -     albuterol (ProAir HFA) 90 mcg/act inhaler; Inhale 2 puffs every 4 (four) hours as needed for shortness of breath  -     CBC and differential  -     CT chest wo contrast; Future    Screening for lung cancer  -     CT chest wo contrast; Future    Encounter for screening mammogram for malignant neoplasm of breast  -     Mammo screening bilateral w 3d & cad; Future    Former cigarette smoker  -     CT chest wo contrast; Future          Subjective:      Patient ID: Silvia Tilley is a 79 y.o. female.    Presents in the office for follow-up chronic conditions.  Patient has pretension.  This is well-controlled with Maxide at 25 mg.  Per lipidemia she is on lovastatin 40 mg.  CBC is normal.  CMP is normal.  Lipid panel is good.  Lisuride slightly elevated.  She will eat  low-carb low sugar diet.  Has mild COPD she uses Spiriva daily and Proventil inhaler if needed.  Her breathing is currently stable.  Osteopenia she is on calcium and vitamin D supplements.  Recent episode of shingles on the right waist.  This is totally resolved        The following portions of the patient's history were reviewed and updated as appropriate: She  has a past medical history of Hyperlipemia, Hypertension, and Multiple benign polyps of large intestine.  Current Outpatient Medications   Medication Sig Dispense Refill    albuterol (ProAir HFA) 90 mcg/act inhaler Inhale 2 puffs every 4 (four) hours as needed for shortness of breath 24 g 1    Calcium Carb-Cholecalciferol (CALCIUM 1000 + D PO) Take by mouth      coenzyme Q-10 100 MG capsule Take by mouth      lovastatin (MEVACOR) 40 MG tablet TAKE 1 TABLET DAILY 90 tablet 1    Multiple Vitamins-Minerals (MULTI FOR HER) TABS Take by mouth      Omega-3 Fatty Acids (FISH OIL) 645 MG CAPS Take by mouth      tiotropium (SPIRIVA) 18 mcg inhalation capsule Place 1 capsule (18 mcg total) into inhaler and inhale daily 90 capsule 1    triamterene-hydrochlorothiazide (MAXZIDE-25) 37.5-25 mg per tablet Take 1 tablet by mouth daily 90 tablet 1    meclizine (ANTIVERT) 25 mg tablet Take 1 tablet (25 mg total) by mouth 3 (three) times a day as needed for dizziness (Patient not taking: Reported on 5/1/2024) 12 tablet 0    triamcinolone (KENALOG) 0.1 % oral topical paste Apply to the mouth or throat As needed  (Patient not taking: Reported on 6/1/2022)       No current facility-administered medications for this visit.     She has No Known Allergies..    Review of Systems   Constitutional:  Negative for unexpected weight change.   HENT:  Negative for ear pain and sore throat.    Eyes:  Negative for visual disturbance.   Respiratory:  Negative for cough, shortness of breath and wheezing.    Cardiovascular:  Negative for chest pain and leg swelling.   Gastrointestinal:  Negative  for abdominal pain, blood in stool, constipation, diarrhea, nausea and vomiting.   Genitourinary:  Negative for difficulty urinating.   Musculoskeletal:  Positive for arthralgias (left  arm  and  shoulder pain.  no  injury). Negative for myalgias.   Skin:  Negative for rash.   Neurological:  Negative for dizziness, syncope, light-headedness and headaches.   Psychiatric/Behavioral:  Negative for self-injury, sleep disturbance and suicidal ideas. The patient is not nervous/anxious.          Objective:        Physical Exam  Vitals and nursing note reviewed.   Constitutional:       General: She is not in acute distress.     Appearance: Normal appearance. She is well-developed. She is not ill-appearing or diaphoretic.   HENT:      Head: Normocephalic and atraumatic.      Right Ear: Tympanic membrane, ear canal and external ear normal.      Left Ear: Tympanic membrane, ear canal and external ear normal.      Mouth/Throat:      Pharynx: No posterior oropharyngeal erythema.   Eyes:      Conjunctiva/sclera: Conjunctivae normal.      Pupils: Pupils are equal, round, and reactive to light.   Neck:      Thyroid: No thyromegaly.      Vascular: No carotid bruit.   Cardiovascular:      Rate and Rhythm: Normal rate and regular rhythm.      Heart sounds: Murmur heard.      Systolic murmur is present with a grade of 1/6.      No friction rub. No gallop.   Pulmonary:      Effort: Pulmonary effort is normal. No respiratory distress.      Breath sounds: Normal breath sounds. No wheezing.   Abdominal:      General: Bowel sounds are normal. There is no distension.      Palpations: Abdomen is soft. There is no mass.      Tenderness: There is no abdominal tenderness.   Musculoskeletal:      Right lower leg: No edema.      Left lower leg: No edema.      Comments: Left shoulder nontender.  Left elbow nontender.  Has some tenderness along the biceps   Lymphadenopathy:      Cervical: No cervical adenopathy.   Skin:     General: Skin is warm and  dry.      Findings: No erythema or rash.   Neurological:      General: No focal deficit present.      Mental Status: She is alert and oriented to person, place, and time.   Psychiatric:         Mood and Affect: Mood normal.         Behavior: Behavior normal.         Thought Content: Thought content normal.         Judgment: Judgment normal.

## 2024-05-28 ENCOUNTER — TELEPHONE (OUTPATIENT)
Dept: GASTROENTEROLOGY | Facility: CLINIC | Age: 79
End: 2024-05-28

## 2024-05-28 NOTE — TELEPHONE ENCOUNTER
Called patient to schedule 1 year follow up ov.  Patient did not want to schedule an appt.  She said she would call back if she was having any problems.

## 2024-05-30 ENCOUNTER — APPOINTMENT (OUTPATIENT)
Dept: RADIOLOGY | Facility: MEDICAL CENTER | Age: 79
End: 2024-05-30
Payer: COMMERCIAL

## 2024-05-30 ENCOUNTER — OFFICE VISIT (OUTPATIENT)
Dept: FAMILY MEDICINE CLINIC | Facility: CLINIC | Age: 79
End: 2024-05-30
Payer: COMMERCIAL

## 2024-05-30 VITALS
WEIGHT: 146.4 LBS | TEMPERATURE: 98 F | HEIGHT: 63 IN | OXYGEN SATURATION: 95 % | DIASTOLIC BLOOD PRESSURE: 84 MMHG | SYSTOLIC BLOOD PRESSURE: 170 MMHG | HEART RATE: 69 BPM | BODY MASS INDEX: 25.94 KG/M2

## 2024-05-30 DIAGNOSIS — M25.512 CHRONIC LEFT SHOULDER PAIN: ICD-10-CM

## 2024-05-30 DIAGNOSIS — G89.29 CHRONIC LEFT SHOULDER PAIN: Primary | ICD-10-CM

## 2024-05-30 DIAGNOSIS — M25.512 CHRONIC LEFT SHOULDER PAIN: Primary | ICD-10-CM

## 2024-05-30 DIAGNOSIS — G89.29 CHRONIC LEFT SHOULDER PAIN: ICD-10-CM

## 2024-05-30 PROCEDURE — G2211 COMPLEX E/M VISIT ADD ON: HCPCS | Performed by: PHYSICIAN ASSISTANT

## 2024-05-30 PROCEDURE — 99213 OFFICE O/P EST LOW 20 MIN: CPT | Performed by: PHYSICIAN ASSISTANT

## 2024-05-30 PROCEDURE — 73030 X-RAY EXAM OF SHOULDER: CPT

## 2024-05-30 NOTE — PROGRESS NOTES
Assessment/Plan:     Diagnoses and all orders for this visit:    Chronic left shoulder pain  Comments:  Pain x-ray.  Patient may try over-the-counter topicals.  Therapy and orthopedic eval ordered  Orders:  -     Ambulatory Referral to Physical Therapy; Future  -     Ambulatory Referral to Orthopedic Surgery; Future  -     XR shoulder 2+ vw left; Future          Subjective:      Patient ID: Silvia Tilley is a 79 y.o. female.    Patient presents in the office with continued left shoulder and upper arm pain.  We thought it was a strained muscle however it has not resolved.  All offers no relief.  She is now having difficulty with range of motion.  No injury        The following portions of the patient's history were reviewed and updated as appropriate: She   Patient Active Problem List    Diagnosis Date Noted    Allergic rhinitis 06/01/2022    Atypical ductal hyperplasia of breast 01/09/2019    Fibrocystic disease of breast 01/09/2019    COPD (chronic obstructive pulmonary disease) (HCC) 10/10/2018    Polyp of sigmoid colon 04/16/2018    Osteopenia 11/18/2016    Family history of malignant neoplasm of breast 07/15/2016    Cardiac murmur 02/17/2016    Benign essential hypertension 06/11/2012    Hyperlipidemia 06/11/2012     Current Outpatient Medications   Medication Sig Dispense Refill    albuterol (ProAir HFA) 90 mcg/act inhaler Inhale 2 puffs every 4 (four) hours as needed for shortness of breath 24 g 1    Calcium Carb-Cholecalciferol (CALCIUM 1000 + D PO) Take by mouth      coenzyme Q-10 100 MG capsule Take by mouth      lovastatin (MEVACOR) 40 MG tablet TAKE 1 TABLET DAILY 90 tablet 1    Multiple Vitamins-Minerals (MULTI FOR HER) TABS Take by mouth      Omega-3 Fatty Acids (FISH OIL) 645 MG CAPS Take by mouth      tiotropium (SPIRIVA) 18 mcg inhalation capsule Place 1 capsule (18 mcg total) into inhaler and inhale daily 90 capsule 1    triamterene-hydrochlorothiazide (MAXZIDE-25) 37.5-25 mg per tablet Take 1 tablet  by mouth daily 90 tablet 1    meclizine (ANTIVERT) 25 mg tablet Take 1 tablet (25 mg total) by mouth 3 (three) times a day as needed for dizziness (Patient not taking: Reported on 5/1/2024) 12 tablet 0    triamcinolone (KENALOG) 0.1 % oral topical paste Apply to the mouth or throat As needed  (Patient not taking: Reported on 6/1/2022)       No current facility-administered medications for this visit.     She has No Known Allergies..    Review of Systems   Musculoskeletal:  Positive for arthralgias.        Left   shoulder  and  upper  arm  pain         Objective:        Physical Exam  Vitals and nursing note reviewed.   Constitutional:       General: She is not in acute distress.     Appearance: Normal appearance. She is not ill-appearing.   HENT:      Head: Normocephalic and atraumatic.      Right Ear: External ear normal.      Left Ear: External ear normal.   Eyes:      Conjunctiva/sclera: Conjunctivae normal.   Cardiovascular:      Rate and Rhythm: Normal rate and regular rhythm.      Heart sounds: Normal heart sounds. No murmur heard.  Pulmonary:      Effort: Pulmonary effort is normal.      Breath sounds: Normal breath sounds.   Musculoskeletal:      Comments: Patient has decreased range of motion in her shoulder with the left side.  Decreased abduction.  Pain with passive resistance.  Pinpoint tenderness on palpation   Skin:     General: Skin is warm and dry.   Neurological:      General: No focal deficit present.      Mental Status: She is alert and oriented to person, place, and time.

## 2024-06-10 ENCOUNTER — OFFICE VISIT (OUTPATIENT)
Dept: OBGYN CLINIC | Facility: MEDICAL CENTER | Age: 79
End: 2024-06-10
Payer: COMMERCIAL

## 2024-06-10 VITALS
HEART RATE: 67 BPM | SYSTOLIC BLOOD PRESSURE: 167 MMHG | HEIGHT: 63 IN | DIASTOLIC BLOOD PRESSURE: 97 MMHG | WEIGHT: 145.6 LBS | BODY MASS INDEX: 25.8 KG/M2

## 2024-06-10 DIAGNOSIS — M77.8 TENDINITIS OF LEFT SHOULDER: ICD-10-CM

## 2024-06-10 DIAGNOSIS — M19.012 PRIMARY OSTEOARTHRITIS OF LEFT SHOULDER: Primary | ICD-10-CM

## 2024-06-10 DIAGNOSIS — M25.512 CHRONIC LEFT SHOULDER PAIN: ICD-10-CM

## 2024-06-10 DIAGNOSIS — G89.29 CHRONIC LEFT SHOULDER PAIN: ICD-10-CM

## 2024-06-10 PROCEDURE — 99204 OFFICE O/P NEW MOD 45 MIN: CPT | Performed by: ORTHOPAEDIC SURGERY

## 2024-06-10 PROCEDURE — 20610 DRAIN/INJ JOINT/BURSA W/O US: CPT | Performed by: ORTHOPAEDIC SURGERY

## 2024-06-10 RX ORDER — TRIAMCINOLONE ACETONIDE 40 MG/ML
40 INJECTION, SUSPENSION INTRA-ARTICULAR; INTRAMUSCULAR
Status: COMPLETED | OUTPATIENT
Start: 2024-06-10 | End: 2024-06-10

## 2024-06-10 RX ORDER — METHYLPREDNISOLONE 4 MG/1
TABLET ORAL
Qty: 1 EACH | Refills: 0 | Status: SHIPPED | OUTPATIENT
Start: 2024-06-10

## 2024-06-10 RX ADMIN — TRIAMCINOLONE ACETONIDE 40 MG: 40 INJECTION, SUSPENSION INTRA-ARTICULAR; INTRAMUSCULAR at 09:45

## 2024-06-10 NOTE — PROGRESS NOTES
[facility] [place]  [address]  [place] [address]       Silvia Tilley  [id number]  1945    ORTHOPAEDIC SURGERY OUTPATIENT NOTE  6/10/2024      HISTORY:  79 y.o. female  presents today consultation for left shoulder pain. She was referred by TYESHA Willis from her PCP's office.  Patient states she was having pain in the lateral humerus region from the last 6 weeks, denies any injuries.  She states she has been having difficulty with abduction, internal rotation and also has trouble sleeping at night due to the pain.  She has tried taking Tylenol with no relief.  She denies any numbness or tingling.  Patient is lifetime dominant.  Patient has no history of having treatments on her left shoulder.    Past Medical History:   Diagnosis Date    Hyperlipemia     Hypertension     Multiple benign polyps of large intestine        Past Surgical History:   Procedure Laterality Date    ARTHROSCOPY KNEE Left     INCISIONAL BREAST BIOPSY      SKIN LESION EXCISION      lips benign    TUBAL LIGATION      VARICOSE VEIN SURGERY      ligation       Social History     Socioeconomic History    Marital status:      Spouse name: Not on file    Number of children: Not on file    Years of education: Not on file    Highest education level: Not on file   Occupational History    Not on file   Tobacco Use    Smoking status: Former     Current packs/day: 0.00     Average packs/day: 1 pack/day for 49.0 years (49.0 ttl pk-yrs)     Types: Cigarettes     Start date:      Quit date:      Years since quittin.4     Passive exposure: Past    Smokeless tobacco: Never   Vaping Use    Vaping status: Never Used   Substance and Sexual Activity    Alcohol use: Not Currently     Comment: social drinker as per [employer]    Drug use: No    Sexual activity: Not Currently   Other Topics Concern    Not on file   Social History Narrative    Not on file     Social  Determinants of Health     Financial Resource Strain: Low Risk  (11/1/2023)    Overall Financial Resource Strain (CARDIA)     Difficulty of Paying Living Expenses: Not hard at all   Food Insecurity: Not on file   Transportation Needs: No Transportation Needs (11/1/2023)    PRAPARE - Transportation     Lack of Transportation (Medical): No     Lack of Transportation (Non-Medical): No   Physical Activity: Not on file   Stress: Not on file   Social Connections: Not on file   Intimate Partner Violence: Not on file   Housing Stability: Not on file       Family History   Problem Relation Age of Onset    Hypertension Mother     Pancreatic cancer Mother     Other Father         MVA with significant injury    Breast cancer Sister     Lung cancer Brother         Patient's Medications   New Prescriptions    No medications on file   Previous Medications    ALBUTEROL (PROAIR HFA) 90 MCG/ACT INHALER    Inhale 2 puffs every 4 (four) hours as needed for shortness of breath    CALCIUM CARB-CHOLECALCIFEROL (CALCIUM 1000 + D PO)    Take by mouth    COENZYME Q-10 100 MG CAPSULE    Take by mouth    LOVASTATIN (MEVACOR) 40 MG TABLET    TAKE 1 TABLET DAILY    MECLIZINE (ANTIVERT) 25 MG TABLET    Take 1 tablet (25 mg total) by mouth 3 (three) times a day as needed for dizziness    MULTIPLE VITAMINS-MINERALS (MULTI FOR HER) TABS    Take by mouth    OMEGA-3 FATTY ACIDS (FISH OIL) 645 MG CAPS    Take by mouth    TIOTROPIUM (SPIRIVA) 18 MCG INHALATION CAPSULE    Place 1 capsule (18 mcg total) into inhaler and inhale daily    TRIAMCINOLONE (KENALOG) 0.1 % ORAL TOPICAL PASTE    Apply to the mouth or throat As needed     TRIAMTERENE-HYDROCHLOROTHIAZIDE (MAXZIDE-25) 37.5-25 MG PER TABLET    Take 1 tablet by mouth daily   Modified Medications    No medications on file   Discontinued Medications    No medications on file       No Known Allergies     /97 (BP Location: Left arm, Patient Position: Sitting, Cuff Size: Standard)   Pulse 67   Ht 5'  "3\" (1.6 m)   Wt 66 kg (145 lb 9.6 oz)   BMI 25.79 kg/m²      REVIEW OF SYSTEMS:  Constitutional: Negative.    HEENT: Negative.    Respiratory: Negative.    Skin: Negative.    Neurological: Negative.    Psychiatric/Behavioral: Negative.  Musculoskeletal: Negative except for that mentioned in the HPI.      PHYSICAL EXAM:    LEFT SHOULDER:    Appearance: Skin intact, no erythema     Forward flexion:   140 degrees   Abduction:  180 degrees   External rotation at 90 degrees abduction:   90 degrees   Internal rotation at 90 degrees abduction:  90 degrees   External rotation at 0 degrees:   50 degrees   Internal rotation: L 1     STRENGTH:  Forward flexion:  5/5   Abduction:  5/5   External rotation:  5/5   Internal rotation:  5/5        Speed test: +   Yergason's: Negative   Tender to palpation ACJ (acromioclavicular joint): Negative   Tender to palpation LHB (long head of biceps): Negative  Ocasio test: Negative  Kinney test: +   Hornblower's: Negative  Lift off: Negative  Belly press: Negative  Bear hug: Negative  External lag sign: Negative  Cross-body adduction: Negative  Sulcus sign: Negative  Elyse's test: +   Drop arm test: negative    Radial/median/ulnar nerve intact    <2 sec cap refill       IMAGING:  XR let shoulder demonstrates moderate glenohumeral osteoarthritis     ASSESSMENT AND PLAN:  79 y.o. female  left shoulder tendinitis and glenohumeral osteoarthritis  X-ray left shoulder was reviewed in the office today with the patient.  Patient on exam examined as tendinitis of the shoulder.  Prescribed patient a Medrol Dosepak to help decrease the pain.  Patient is leaving for [place] on June 18 and will start physical therapy when she gets back.  PT order was placed for strengthening exercises.  Patient received a left subacromial bursa steroid injection in the office today. May taken Tylenol as needed for pain relief. She is to follow up PRN.       Large joint arthrocentesis: L subacromial bursa  Universal " "Protocol:  Consent: Verbal consent obtained.  Risks and benefits: risks, benefits and alternatives were discussed  Consent given by: patient  Time out: Immediately prior to procedure a \"time out\" was called to verify the correct patient, procedure, equipment, support staff and site/side marked as required.  Patient understanding: patient states understanding of the procedure being performed  Site marked: the operative site was marked  Supporting Documentation  Indications: pain   Procedure Details  Location: shoulder - L subacromial bursa  Preparation: Patient was prepped and draped in the usual sterile fashion  Needle size: 22 G  Approach: posterior  Medications administered: 40 mg triamcinolone acetonide 40 mg/mL (4 ml Ropivacaine)    Patient tolerance: patient tolerated the procedure well with no immediate complications  Dressing:  Sterile dressing applied           Scribe Attestation      I,:  Ajith Chavis am acting as a scribe while in the presence of the attending physician.:       I,:  Laxmi Hutson DO personally performed the services described in this documentation    as scribed in my presence.:             "

## 2024-06-10 NOTE — LETTER
June 12, 2024     Hina Howard PA-C  [address]  [address]  [facility] [address]    Patient: Silvia Tilley   YOB: 1945   Date of Visit: 6/10/2024       Dear Dr. Howard:    Thank you for referring Silvia Tilley to me for evaluation. Below are my notes for this consultation.    If you have questions, please do not hesitate to call me. I look forward to following your patient along with you.         Sincerely,        Laxmi Hutson DO        CC: No Recipients    Laxmi Hutson DO  6/10/2024 12:38 PM  Signed  [facility] [place]  [address] [id number]       Siliva Tilley  [id number]  1945    ORTHOPAEDIC SURGERY OUTPATIENT NOTE  6/10/2024      HISTORY:  79 y.o. female  presents today consultation for left shoulder pain. She was referred by TYESHA Willis from her PCP's office.  Patient states she was having pain in the lateral humerus region from the last 6 weeks, denies any injuries.  She states she has been having difficulty with abduction, internal rotation and also has trouble sleeping at night due to the pain.  She has tried taking Tylenol with no relief.  She denies any numbness or tingling.  Patient is lifetime dominant.  Patient has no history of having treatments on her left shoulder.    Past Medical History:   Diagnosis Date   • Hyperlipemia    • Hypertension    • Multiple benign polyps of large intestine        Past Surgical History:   Procedure Laterality Date   • ARTHROSCOPY KNEE Left    • INCISIONAL BREAST BIOPSY     • SKIN LESION EXCISION      lips benign   • TUBAL LIGATION  2/73   • VARICOSE VEIN SURGERY      ligation       Social History     Socioeconomic History   • Marital status:      Spouse name: Not on file   • Number of children: Not on file   • Years of education: Not on file   • Highest education level: Not on file   Occupational History   • Not on file   Tobacco Use   • Smoking  status: Former     Current packs/day: 0.00     Average packs/day: 1 pack/day for 49.0 years (49.0 ttl pk-yrs)     Types: Cigarettes     Start date:      Quit date:      Years since quittin.4     Passive exposure: Past   • Smokeless tobacco: Never   Vaping Use   • Vaping status: Never Used   Substance and Sexual Activity   • Alcohol use: Not Currently     Comment: social drinker as per [employer]   • Drug use: No   • Sexual activity: Not Currently   Other Topics Concern   • Not on file   Social History Narrative   • Not on file     Social Determinants of Health     Financial Resource Strain: Low Risk  (2023)    Overall Financial Resource Strain (CARDIA)    • Difficulty of Paying Living Expenses: Not hard at all   Food Insecurity: Not on file   Transportation Needs: No Transportation Needs (2023)    PRAPARE - Transportation    • Lack of Transportation (Medical): No    • Lack of Transportation (Non-Medical): No   Physical Activity: Not on file   Stress: Not on file   Social Connections: Not on file   Intimate Partner Violence: Not on file   Housing Stability: Not on file       Family History   Problem Relation Age of Onset   • Hypertension Mother    • Pancreatic cancer Mother    • Other Father         MVA with significant injury   • Breast cancer Sister    • Lung cancer Brother         Patient's Medications   New Prescriptions    No medications on file   Previous Medications    ALBUTEROL (PROAIR HFA) 90 MCG/ACT INHALER    Inhale 2 puffs every 4 (four) hours as needed for shortness of breath    CALCIUM CARB-CHOLECALCIFEROL (CALCIUM 1000 + D PO)    Take by mouth    COENZYME Q-10 100 MG CAPSULE    Take by mouth    LOVASTATIN (MEVACOR) 40 MG TABLET    TAKE 1 TABLET DAILY    MECLIZINE (ANTIVERT) 25 MG TABLET    Take 1 tablet (25 mg total) by mouth 3 (three) times a day as needed for dizziness    MULTIPLE VITAMINS-MINERALS (MULTI FOR HER) TABS    Take by mouth    OMEGA-3 FATTY ACIDS (FISH OIL) 645 MG  "CAPS    Take by mouth    TIOTROPIUM (SPIRIVA) 18 MCG INHALATION CAPSULE    Place 1 capsule (18 mcg total) into inhaler and inhale daily    TRIAMCINOLONE (KENALOG) 0.1 % ORAL TOPICAL PASTE    Apply to the mouth or throat As needed     TRIAMTERENE-HYDROCHLOROTHIAZIDE (MAXZIDE-25) 37.5-25 MG PER TABLET    Take 1 tablet by mouth daily   Modified Medications    No medications on file   Discontinued Medications    No medications on file       No Known Allergies     /97 (BP Location: Left arm, Patient Position: Sitting, Cuff Size: Standard)   Pulse 67   Ht 5' 3\" (1.6 m)   Wt 66 kg (145 lb 9.6 oz)   BMI 25.79 kg/m²      REVIEW OF SYSTEMS:  Constitutional: Negative.    HEENT: Negative.    Respiratory: Negative.    Skin: Negative.    Neurological: Negative.    Psychiatric/Behavioral: Negative.  Musculoskeletal: Negative except for that mentioned in the HPI.      PHYSICAL EXAM:    LEFT SHOULDER:    Appearance: Skin intact, no erythema     Forward flexion:   140 degrees   Abduction:  180 degrees   External rotation at 90 degrees abduction:   90 degrees   Internal rotation at 90 degrees abduction:  90 degrees   External rotation at 0 degrees:   50 degrees   Internal rotation: L 1     STRENGTH:  Forward flexion:  5/5   Abduction:  5/5   External rotation:  5/5   Internal rotation:  5/5        Speed test: +   Yergason's: Negative   Tender to palpation ACJ (acromioclavicular joint): Negative   Tender to palpation LHB (long head of biceps): Negative  Ocasio test: Negative  Butte test: +   Hornblower's: Negative  Lift off: Negative  Belly press: Negative  Bear hug: Negative  External lag sign: Negative  Cross-body adduction: Negative  Sulcus sign: Negative  Elyse's test: +   Drop arm test: negative    Radial/median/ulnar nerve intact    <2 sec cap refill       IMAGING:  XR let shoulder demonstrates moderate glenohumeral osteoarthritis     ASSESSMENT AND PLAN:  79 y.o. female  left shoulder tendinitis and glenohumeral " "osteoarthritis  X-ray left shoulder was reviewed in the office today with the patient.  Patient on exam examined as tendinitis of the shoulder.  Prescribed patient a Medrol Dosepak to help decrease the pain.  Patient is leaving for [place] on June 18 and will start physical therapy when she gets back.  PT order was placed for strengthening exercises.  Patient received a left subacromial bursa steroid injection in the office today. May taken Tylenol as needed for pain relief. She is to follow up PRN.       Large joint arthrocentesis: L subacromial bursa  Universal Protocol:  Consent: Verbal consent obtained.  Risks and benefits: risks, benefits and alternatives were discussed  Consent given by: patient  Time out: Immediately prior to procedure a \"time out\" was called to verify the correct patient, procedure, equipment, support staff and site/side marked as required.  Patient understanding: patient states understanding of the procedure being performed  Site marked: the operative site was marked  Supporting Documentation  Indications: pain   Procedure Details  Location: shoulder - L subacromial bursa  Preparation: Patient was prepped and draped in the usual sterile fashion  Needle size: 22 G  Approach: posterior  Medications administered: 40 mg triamcinolone acetonide 40 mg/mL (4 ml Ropivacaine)    Patient tolerance: patient tolerated the procedure well with no immediate complications  Dressing:  Sterile dressing applied           Scribe Attestation      I,:  Ajith Chavis am acting as a scribe while in the presence of the attending physician.:       I,:  Laxmi Hutson DO personally performed the services described in this documentation    as scribed in my presence.:           "

## 2024-06-15 DIAGNOSIS — I10 ESSENTIAL HYPERTENSION: ICD-10-CM

## 2024-06-15 RX ORDER — TRIAMTERENE AND HYDROCHLOROTHIAZIDE 37.5; 25 MG/1; MG/1
1 TABLET ORAL DAILY
Qty: 90 TABLET | Refills: 1 | Status: SHIPPED | OUTPATIENT
Start: 2024-06-15

## 2024-06-28 ENCOUNTER — OFFICE VISIT (OUTPATIENT)
Dept: FAMILY MEDICINE CLINIC | Facility: CLINIC | Age: 79
End: 2024-06-28
Payer: COMMERCIAL

## 2024-06-28 ENCOUNTER — NURSE TRIAGE (OUTPATIENT)
Dept: OTHER | Facility: OTHER | Age: 79
End: 2024-06-28

## 2024-06-28 VITALS
OXYGEN SATURATION: 96 % | WEIGHT: 141.6 LBS | DIASTOLIC BLOOD PRESSURE: 80 MMHG | HEART RATE: 71 BPM | BODY MASS INDEX: 25.09 KG/M2 | SYSTOLIC BLOOD PRESSURE: 130 MMHG | HEIGHT: 63 IN | TEMPERATURE: 97.7 F

## 2024-06-28 DIAGNOSIS — J06.9 UPPER RESPIRATORY TRACT INFECTION, UNSPECIFIED TYPE: Primary | ICD-10-CM

## 2024-06-28 DIAGNOSIS — J44.9 CHRONIC OBSTRUCTIVE PULMONARY DISEASE, UNSPECIFIED COPD TYPE (HCC): ICD-10-CM

## 2024-06-28 PROCEDURE — 99213 OFFICE O/P EST LOW 20 MIN: CPT | Performed by: PHYSICIAN ASSISTANT

## 2024-06-28 PROCEDURE — G2211 COMPLEX E/M VISIT ADD ON: HCPCS | Performed by: PHYSICIAN ASSISTANT

## 2024-06-28 RX ORDER — PREDNISONE 10 MG/1
10 TABLET ORAL 2 TIMES DAILY WITH MEALS
Qty: 10 TABLET | Refills: 0 | Status: SHIPPED | OUTPATIENT
Start: 2024-06-28

## 2024-06-28 RX ORDER — AZITHROMYCIN 250 MG/1
TABLET, FILM COATED ORAL
Qty: 6 TABLET | Refills: 0 | Status: SHIPPED | OUTPATIENT
Start: 2024-06-28 | End: 2024-07-03

## 2024-06-28 NOTE — PROGRESS NOTES
Assessment/Plan:     Diagnoses and all orders for this visit:    Upper respiratory tract infection, unspecified type  Comments:  Start p.o. azithromycin and p.o. steroids.  Orders:  -     azithromycin (ZITHROMAX) 250 mg tablet; Take 2 tablets today then 1 tablet daily x 4 days  -     predniSONE 10 mg tablet; Take 1 tablet (10 mg total) by mouth 2 (two) times a day with meals    Chronic obstructive pulmonary disease, unspecified COPD type (HCC)  Comments:  Continue inhaler every 4-6 hours as needed.  May take Mucinex  Orders:  -     azithromycin (ZITHROMAX) 250 mg tablet; Take 2 tablets today then 1 tablet daily x 4 days  -     predniSONE 10 mg tablet; Take 1 tablet (10 mg total) by mouth 2 (two) times a day with meals          Subjective:      Patient ID: Silvia Tilley is a 79 y.o. female.    Patient presents in the office with upper respiratory symptoms.  Was in [place] at [facility].  She has been sick for 2 days.  She has a lot of nasal congestion and sinus pressure.  She has postnasal drip she is also coughing.        The following portions of the patient's history were reviewed and updated as appropriate: She   Patient Active Problem List    Diagnosis Date Noted    Allergic rhinitis 06/01/2022    Atypical ductal hyperplasia of breast 01/09/2019    Fibrocystic disease of breast 01/09/2019    COPD (chronic obstructive pulmonary disease) (HCC) 10/10/2018    Polyp of sigmoid colon 04/16/2018    Osteopenia 11/18/2016    Family history of malignant neoplasm of breast 07/15/2016    Cardiac murmur 02/17/2016    Benign essential hypertension 06/11/2012    Hyperlipidemia 06/11/2012     Current Outpatient Medications   Medication Sig Dispense Refill    albuterol (ProAir HFA) 90 mcg/act inhaler Inhale 2 puffs every 4 (four) hours as needed for shortness of breath 24 g 1    azithromycin (ZITHROMAX) 250 mg tablet Take 2 tablets today then 1 tablet daily x 4 days 6 tablet 0    Calcium Carb-Cholecalciferol (CALCIUM 1000 + D PO)  Take by mouth      coenzyme Q-10 100 MG capsule Take by mouth      lovastatin (MEVACOR) 40 MG tablet TAKE 1 TABLET DAILY 90 tablet 1    Multiple Vitamins-Minerals (MULTI FOR HER) TABS Take by mouth      Omega-3 Fatty Acids (FISH OIL) 645 MG CAPS Take by mouth      predniSONE 10 mg tablet Take 1 tablet (10 mg total) by mouth 2 (two) times a day with meals 10 tablet 0    tiotropium (SPIRIVA) 18 mcg inhalation capsule Place 1 capsule (18 mcg total) into inhaler and inhale daily 90 capsule 1    triamterene-hydrochlorothiazide (MAXZIDE-25) 37.5-25 mg per tablet TAKE 1 TABLET DAILY 90 tablet 1    meclizine (ANTIVERT) 25 mg tablet Take 1 tablet (25 mg total) by mouth 3 (three) times a day as needed for dizziness (Patient not taking: Reported on 5/1/2024) 12 tablet 0    triamcinolone (KENALOG) 0.1 % oral topical paste Apply to the mouth or throat As needed  (Patient not taking: Reported on 6/1/2022)       No current facility-administered medications for this visit.     She has No Known Allergies..    Review of Systems   Constitutional:  Negative for activity change, appetite change, chills, fatigue and fever.   HENT:  Positive for congestion, sinus pressure and sinus pain. Negative for ear pain, postnasal drip and rhinorrhea.    Respiratory:  Positive for cough. Negative for shortness of breath.          Objective:        Physical Exam  Vitals and nursing note reviewed.   Constitutional:       General: She is not in acute distress.     Appearance: Normal appearance. She is not ill-appearing or diaphoretic.   HENT:      Head: Normocephalic and atraumatic.      Right Ear: Tympanic membrane, ear canal and external ear normal.      Left Ear: Tympanic membrane, ear canal and external ear normal.      Nose: Congestion present.      Right Sinus: No maxillary sinus tenderness or frontal sinus tenderness.      Left Sinus: No maxillary sinus tenderness or frontal sinus tenderness.      Mouth/Throat:      Pharynx: No oropharyngeal  exudate or posterior oropharyngeal erythema.   Eyes:      Conjunctiva/sclera: Conjunctivae normal.      Pupils: Pupils are equal, round, and reactive to light.   Neck:      Thyroid: No thyromegaly.      Vascular: No carotid bruit.   Cardiovascular:      Rate and Rhythm: Normal rate and regular rhythm.      Heart sounds: No murmur heard.     No friction rub. No gallop.   Pulmonary:      Effort: Pulmonary effort is normal. No respiratory distress.      Breath sounds: Normal breath sounds. No wheezing.   Musculoskeletal:         General: No edema.   Lymphadenopathy:      Cervical: No cervical adenopathy.   Skin:     General: Skin is warm and dry.      Findings: No erythema or rash.   Neurological:      Mental Status: She is alert and oriented to person, place, and time.   Psychiatric:         Mood and Affect: Mood and affect normal.         Behavior: Behavior normal.         Thought Content: Thought content normal.         Judgment: Judgment normal.

## 2024-06-28 NOTE — TELEPHONE ENCOUNTER
"Regarding: Sinus infection  ----- Message from Ashleigh CASTAÑEDA sent at 6/28/2024  7:08 AM EDT -----  \"I've had a sinus infection for 2 days and it won't go away. I have a headache, stuffy, runny nose, and I'm coughing up yellowish green phlegm. I would like to see Hina today.\"    "

## 2024-06-28 NOTE — TELEPHONE ENCOUNTER
"Reason for Disposition  • [1] Using nasal washes and pain medicine > 24 hours AND [2] sinus pain (lower forehead, cheekbone, or eye) persists    Answer Assessment - Initial Assessment Questions  1. ONSET: \"When did the nasal discharge start?\"       2 days ago  2. AMOUNT: \"How much discharge is there?\"       A lot of discharge  3. COUGH: \"Do you have a cough?\" If yes, ask: \"Describe the color of your sputum\" (clear, white, yellow, green)      Yellow/green phlegm  4. RESPIRATORY DISTRESS: \"Describe your breathing.\"       Denies difficulty breathing, feels tightness omer coughing  5. FEVER: \"Do you have a fever?\" If Yes, ask: \"What is your temperature, how was it measured, and when did it start?\"      denies  6. SEVERITY: \"Overall, how bad are you feeling right now?\" (e.g., doesn't interfere with normal activities, staying home from school/work, staying in bed)       denies  7. OTHER SYMPTOMS: \"Do you have any other symptoms?\" (e.g., sore throat, earache, wheezing, vomiting)      denies    Protocols used: Common Cold-ADULT-AH    "

## 2024-10-14 ENCOUNTER — HOSPITAL ENCOUNTER (OUTPATIENT)
Dept: RADIOLOGY | Facility: MEDICAL CENTER | Age: 79
Discharge: HOME | End: 2024-10-14
Payer: COMMERCIAL

## 2024-10-14 DIAGNOSIS — Z12.2 SCREENING FOR LUNG CANCER: ICD-10-CM

## 2024-10-14 DIAGNOSIS — Z87.891 FORMER CIGARETTE SMOKER: ICD-10-CM

## 2024-10-14 DIAGNOSIS — J44.9 CHRONIC OBSTRUCTIVE PULMONARY DISEASE, UNSPECIFIED COPD TYPE (HCC): ICD-10-CM

## 2024-10-14 PROCEDURE — 71250 CT THORAX DX C-: CPT

## 2024-10-16 DIAGNOSIS — E78.5 HYPERLIPIDEMIA, UNSPECIFIED HYPERLIPIDEMIA TYPE: ICD-10-CM

## 2024-10-16 LAB
ALBUMIN SERPL-MCNC: 4.1 G/DL (ref 3.6–5.1)
ALBUMIN/GLOB SERPL: 1.7 (CALC) (ref 1–2.5)
ALP SERPL-CCNC: 69 U/L (ref 37–153)
ALT SERPL-CCNC: 14 U/L (ref 6–29)
AST SERPL-CCNC: 17 U/L (ref 10–35)
BASOPHILS # BLD AUTO: 72 CELLS/UL (ref 0–200)
BASOPHILS NFR BLD AUTO: 1.6 %
BILIRUB SERPL-MCNC: 0.5 MG/DL (ref 0.2–1.2)
BUN SERPL-MCNC: 19 MG/DL (ref 7–25)
BUN/CREAT SERPL: ABNORMAL (CALC) (ref 6–22)
CALCIUM SERPL-MCNC: 10 MG/DL (ref 8.6–10.4)
CHLORIDE SERPL-SCNC: 100 MMOL/L (ref 98–110)
CHOLEST SERPL-MCNC: 174 MG/DL
CHOLEST/HDLC SERPL: 3.6 (CALC)
CO2 SERPL-SCNC: 35 MMOL/L (ref 20–32)
CREAT SERPL-MCNC: 0.84 MG/DL (ref 0.6–1)
EOSINOPHIL # BLD AUTO: 59 CELLS/UL (ref 15–500)
EOSINOPHIL NFR BLD AUTO: 1.3 %
ERYTHROCYTE [DISTWIDTH] IN BLOOD BY AUTOMATED COUNT: 11.3 % (ref 11–15)
GFR/BSA.PRED SERPLBLD CYS-BASED-ARV: 71 ML/MIN/1.73M2
GLOBULIN SER CALC-MCNC: 2.4 G/DL (CALC) (ref 1.9–3.7)
GLUCOSE SERPL-MCNC: 86 MG/DL (ref 65–99)
HCT VFR BLD AUTO: 44.8 % (ref 35–45)
HDLC SERPL-MCNC: 49 MG/DL
HGB BLD-MCNC: 14.6 G/DL (ref 11.7–15.5)
LDLC SERPL CALC-MCNC: 104 MG/DL (CALC)
LYMPHOCYTES # BLD AUTO: 1715 CELLS/UL (ref 850–3900)
LYMPHOCYTES NFR BLD AUTO: 38.1 %
MCH RBC QN AUTO: 30.5 PG (ref 27–33)
MCHC RBC AUTO-ENTMCNC: 32.6 G/DL (ref 32–36)
MCV RBC AUTO: 93.7 FL (ref 80–100)
MONOCYTES # BLD AUTO: 401 CELLS/UL (ref 200–950)
MONOCYTES NFR BLD AUTO: 8.9 %
NEUTROPHILS # BLD AUTO: 2255 CELLS/UL (ref 1500–7800)
NEUTROPHILS NFR BLD AUTO: 50.1 %
NONHDLC SERPL-MCNC: 125 MG/DL (CALC)
PLATELET # BLD AUTO: 292 THOUSAND/UL (ref 140–400)
PMV BLD REES-ECKER: 9.6 FL (ref 7.5–12.5)
POTASSIUM SERPL-SCNC: 3.9 MMOL/L (ref 3.5–5.3)
PROT SERPL-MCNC: 6.5 G/DL (ref 6.1–8.1)
RBC # BLD AUTO: 4.78 MILLION/UL (ref 3.8–5.1)
SODIUM SERPL-SCNC: 140 MMOL/L (ref 135–146)
TRIGL SERPL-MCNC: 117 MG/DL
WBC # BLD AUTO: 4.5 THOUSAND/UL (ref 3.8–10.8)

## 2024-10-16 RX ORDER — LOVASTATIN 40 MG/1
40 TABLET ORAL DAILY
Qty: 90 TABLET | Refills: 1 | Status: SHIPPED | OUTPATIENT
Start: 2024-10-16

## 2024-11-04 ENCOUNTER — TELEPHONE (OUTPATIENT)
Age: 79
End: 2024-11-04

## 2024-11-04 NOTE — TELEPHONE ENCOUNTER
"Patient called for appt, yesterday she was going down stairs and her left knee \"gave out\".  She did not fall.  Patient having pain with ambulation and slight swelling.  Using ice.    No available appt today  "

## 2024-11-05 ENCOUNTER — OFFICE VISIT (OUTPATIENT)
Dept: FAMILY MEDICINE CLINIC | Facility: CLINIC | Age: 79
End: 2024-11-05
Payer: COMMERCIAL

## 2024-11-05 ENCOUNTER — APPOINTMENT (OUTPATIENT)
Dept: RADIOLOGY | Facility: MEDICAL CENTER | Age: 79
End: 2024-11-05
Payer: COMMERCIAL

## 2024-11-05 VITALS
BODY MASS INDEX: 25.37 KG/M2 | HEIGHT: 63 IN | SYSTOLIC BLOOD PRESSURE: 126 MMHG | WEIGHT: 143.2 LBS | DIASTOLIC BLOOD PRESSURE: 88 MMHG | HEART RATE: 70 BPM | TEMPERATURE: 97.8 F | OXYGEN SATURATION: 97 %

## 2024-11-05 DIAGNOSIS — M25.562 ACUTE PAIN OF LEFT KNEE: ICD-10-CM

## 2024-11-05 DIAGNOSIS — M25.562 ACUTE PAIN OF LEFT KNEE: Primary | ICD-10-CM

## 2024-11-05 PROCEDURE — G2211 COMPLEX E/M VISIT ADD ON: HCPCS | Performed by: PHYSICIAN ASSISTANT

## 2024-11-05 PROCEDURE — 73564 X-RAY EXAM KNEE 4 OR MORE: CPT

## 2024-11-05 PROCEDURE — 99213 OFFICE O/P EST LOW 20 MIN: CPT | Performed by: PHYSICIAN ASSISTANT

## 2024-11-05 NOTE — PROGRESS NOTES
"Ambulatory Visit  Name: Silvia Tilley      : 1945      MRN: [id number]  Encounter Provider: Hina Howard PA-C  Encounter Date: 2024   Encounter department: [facility]    Assessment & Plan  Acute pain of left knee  Patient will continue ice rest and elevate.  Orders:    Ambulatory Referral to Orthopedic Surgery; Future    XR knee 4+ vw left injury; Future       History of Present Illness     Patient states Saturday she was going down 3 steps.  When she got to the third step her left knee gave out.  He did not fall.  She immediately had pain and swelling.  Difficulty weightbearing.  Patient applied ice.  She elevated and took some Tylenol.  States she has had some improvement.          Review of Systems   Musculoskeletal:  Positive for arthralgias.        L  knee    pain.             Objective     /88 (BP Location: Right arm, Patient Position: Sitting, Cuff Size: Extra-Large)   Pulse 70   Temp 97.8 °F (36.6 °C) (Temporal)   Ht 5' 3\" (1.6 m)   Wt 65 kg (143 lb 3.2 oz)   SpO2 97%   Breastfeeding No   BMI 25.37 kg/m²     Physical Exam  Vitals and nursing note reviewed.   Constitutional:       General: She is not in acute distress.     Appearance: Normal appearance. She is not ill-appearing.   HENT:      Right Ear: External ear normal.      Left Ear: External ear normal.   Eyes:      Conjunctiva/sclera: Conjunctivae normal.   Musculoskeletal:      Comments: Bilateral knees are enlarged due to osteoarthritis.  Left knee is under medially.  There is some swelling on the posteriorpolital teal fossa.  No erythema.  No effusion   Skin:     General: Skin is warm and dry.   Neurological:      Mental Status: She is alert.         "

## 2024-11-06 ENCOUNTER — OFFICE VISIT (OUTPATIENT)
Dept: OBGYN CLINIC | Facility: MEDICAL CENTER | Age: 79
End: 2024-11-06
Payer: COMMERCIAL

## 2024-11-06 VITALS
HEART RATE: 70 BPM | HEIGHT: 63 IN | SYSTOLIC BLOOD PRESSURE: 160 MMHG | DIASTOLIC BLOOD PRESSURE: 90 MMHG | WEIGHT: 143 LBS | BODY MASS INDEX: 25.34 KG/M2

## 2024-11-06 DIAGNOSIS — S86.112A STRAIN OF GASTROCNEMIUS MUSCLE OF LEFT LOWER EXTREMITY, INITIAL ENCOUNTER: Primary | ICD-10-CM

## 2024-11-06 PROBLEM — M17.12 PRIMARY OSTEOARTHRITIS OF LEFT KNEE: Status: RESOLVED | Noted: 2024-11-06 | Resolved: 2024-11-06

## 2024-11-06 PROBLEM — M17.12 PRIMARY OSTEOARTHRITIS OF LEFT KNEE: Status: ACTIVE | Noted: 2024-11-06

## 2024-11-06 PROCEDURE — 99204 OFFICE O/P NEW MOD 45 MIN: CPT | Performed by: PHYSICAL MEDICINE & REHABILITATION

## 2024-11-06 NOTE — PROGRESS NOTES
1. Strain of gastrocnemius muscle of left lower extremity, initial encounter      No orders of the defined types were placed in this encounter.    No orders of the defined types were placed in this encounter.      Impression:  Left posterior calf pain likely secondary to tennis leg date of injury as 11/3/2024.  Patient has full range of motion and full strength with all ankle motions.  She has minimal discomfort with resisted ankle plantarflexion.  She is tender along the proximal aspect of the medial gastrocnemius musculature.  She will try compression wrap and can also try diclofenac gel.  I will see her back in about 3 weeks if needed.    Imaging Studies (I personally reviewed images in PACS and report):  Left knee x-rays most recent to this encounter reviewed.  These images show mild osteoarthritis.  No significant joint effusion.    No follow-ups on file.    Patient is in agreement with the above plan.    HPI:  Silvia Tilley is a 79 y.o. female  who presents for evaluation of   Chief Complaint   Patient presents with    Left Knee - Pain     Pt presents with complaints of left knee pain and instability. Had xrays done yesterday ordered by PCP       Onset/Mechanism: Pain started this past Sunday after taking a step on her stairs.  Location: Posterior knee.  Radiation: Denies.  Provocative: Going on her toes.  Severity: Painful.  Associated Symptoms: Denies swelling or bruising.  Treatment so far: Ice and elevation.    Following history reviewed and updated:  Past Medical History:   Diagnosis Date    Hyperlipemia     Hypertension     Multiple benign polyps of large intestine      Past Surgical History:   Procedure Laterality Date    ARTHROSCOPY KNEE Left     INCISIONAL BREAST BIOPSY      SKIN LESION EXCISION      lips benign    TUBAL LIGATION  2/73    VARICOSE VEIN SURGERY      ligation     Social History   Social History     Substance and Sexual Activity   Alcohol Use Not Currently    Comment: social drinker as per  "[employer]     Social History     Substance and Sexual Activity   Drug Use No     Social History     Tobacco Use   Smoking Status Former    Current packs/day: 0.00    Average packs/day: 1 pack/day for 49.0 years (49.0 ttl pk-yrs)    Types: Cigarettes    Start date:     Quit date:     Years since quittin.8    Passive exposure: Past   Smokeless Tobacco Never     Family History   Problem Relation Age of Onset    Hypertension Mother     Pancreatic cancer Mother     Other Father         MVA with significant injury    Breast cancer Sister     Lung cancer Brother      No Known Allergies     Constitutional:  /90   Pulse 70   Ht 5' 3\" (1.6 m)   Wt 64.9 kg (143 lb)   BMI 25.33 kg/m²    General: NAD.  Eyes: Anicteric sclerae.  Neck: Supple.  Lungs: Unlabored breathing.  Cardiovascular: No lower extremity edema.  Skin: Intact without erythema.  Neurologic: Sensation intact to light touch.  Psychiatric: Mood and affect are appropriate.    Left Ankle Exam   Left ankle exam is normal.    Tenderness   The patient is experiencing no tenderness.   Swelling: none    Range of Motion   The patient has normal left ankle ROM.     Muscle Strength   The patient has normal left ankle strength.      Left Knee Exam     Tenderness   Left knee tenderness location: Proximal medial gastrocnemius.    Range of Motion   The patient has normal left knee ROM.    Tests   Varus: negative Valgus: negative             Procedures              "

## 2024-11-11 ENCOUNTER — OFFICE VISIT (OUTPATIENT)
Dept: FAMILY MEDICINE CLINIC | Facility: CLINIC | Age: 79
End: 2024-11-11
Payer: COMMERCIAL

## 2024-11-11 VITALS
OXYGEN SATURATION: 94 % | BODY MASS INDEX: 25.2 KG/M2 | SYSTOLIC BLOOD PRESSURE: 136 MMHG | TEMPERATURE: 97.7 F | DIASTOLIC BLOOD PRESSURE: 72 MMHG | HEIGHT: 63 IN | HEART RATE: 78 BPM | WEIGHT: 142.2 LBS

## 2024-11-11 DIAGNOSIS — E78.5 HYPERLIPIDEMIA, UNSPECIFIED HYPERLIPIDEMIA TYPE: ICD-10-CM

## 2024-11-11 DIAGNOSIS — Z00.00 MEDICARE ANNUAL WELLNESS VISIT, SUBSEQUENT: Primary | ICD-10-CM

## 2024-11-11 DIAGNOSIS — I10 BENIGN ESSENTIAL HYPERTENSION: ICD-10-CM

## 2024-11-11 DIAGNOSIS — J44.9 CHRONIC OBSTRUCTIVE PULMONARY DISEASE, UNSPECIFIED COPD TYPE (HCC): ICD-10-CM

## 2024-11-11 DIAGNOSIS — M85.80 OSTEOPENIA, UNSPECIFIED LOCATION: ICD-10-CM

## 2024-11-11 DIAGNOSIS — Z23 ENCOUNTER FOR IMMUNIZATION: ICD-10-CM

## 2024-11-11 PROCEDURE — G0439 PPPS, SUBSEQ VISIT: HCPCS | Performed by: PHYSICIAN ASSISTANT

## 2024-11-11 PROCEDURE — G0008 ADMIN INFLUENZA VIRUS VAC: HCPCS

## 2024-11-11 PROCEDURE — 99214 OFFICE O/P EST MOD 30 MIN: CPT | Performed by: PHYSICIAN ASSISTANT

## 2024-11-11 PROCEDURE — 90662 IIV NO PRSV INCREASED AG IM: CPT

## 2024-11-11 RX ORDER — TIOTROPIUM BROMIDE 18 UG/1
18 CAPSULE ORAL; RESPIRATORY (INHALATION) DAILY
Qty: 100 CAPSULE | Refills: 1 | Status: SHIPPED | OUTPATIENT
Start: 2024-11-11

## 2024-11-11 RX ORDER — ALBUTEROL SULFATE 90 UG/1
2 INHALANT RESPIRATORY (INHALATION) EVERY 4 HOURS PRN
Qty: 24 G | Refills: 1 | Status: SHIPPED | OUTPATIENT
Start: 2024-11-11

## 2024-11-11 NOTE — PROGRESS NOTES
Ambulatory Visit  Name: Silvia Tilley      : 1945      MRN: [id number]  Encounter Provider: Hina Howard PA-C  Encounter Date: 2024   Encounter department: [facility]    Assessment & Plan  Medicare annual wellness visit, subsequent         Benign essential hypertension  Blood pressure is at goal continue Maxide 25 mg       Hyperlipidemia, unspecified hyperlipidemia type  Lipids at goal continue lovastatin 40 mg and low-fat diet       Chronic obstructive pulmonary disease, unspecified COPD type (HCC)  Stable on Spiriva daily.  Continue as needed Proventil  Orders:    tiotropium (SPIRIVA) 18 mcg inhalation capsule; Place 1 capsule (18 mcg total) into inhaler and inhale daily    albuterol (ProAir HFA) 90 mcg/act inhaler; Inhale 2 puffs every 4 (four) hours as needed for shortness of breath    Osteopenia, unspecified location  Continue calcium vitamin D supplement       Chronic obstructive pulmonary disease, unspecified COPD type (HCC)    Orders:    tiotropium (SPIRIVA) 18 mcg inhalation capsule; Place 1 capsule (18 mcg total) into inhaler and inhale daily    albuterol (ProAir HFA) 90 mcg/act inhaler; Inhale 2 puffs every 4 (four) hours as needed for shortness of breath       Preventive health issues were discussed with patient, and age appropriate screening tests were ordered as noted in patient's After Visit Summary. Personalized health advice and appropriate referrals for health education or preventive services given if needed, as noted in patient's After Visit Summary.    History of Present Illness     Patient presents to the office for follow-up chronic conditions.  Patient has hypertension.  Blood pressure is well-controlled with Maxide 25 mg.  Hyperlipidemia she is on lovastatin 40 mg.  Her CMP CBC and lipid panel are at goal.  Her lipids have greatly improved since we increased the lovastatin.  Has COPD she is on Spiriva daily and ProAir as needed.  Her breathing is stable.   Vaccine will be given today.  Will update her COVID-vaccine at her pharmacy.  RSV vaccine is completed.  For osteopenia she is on calcium vitamin D supplementation.  Patient just saw the orthopedic for left knee pain.  Pulled gastro knee medius.  She has a follow-up in several weeks       Patient Care Team:  Hina Howard PA-C as PCP - General (Family Medicine)  QUINCY Torres MD Brenda Kleinman, PA-C Chand Rohatgi, MD    Review of Systems   Constitutional:  Negative for unexpected weight change.   HENT:  Negative for ear pain and sore throat.    Eyes:  Negative for visual disturbance.   Respiratory:  Negative for cough, shortness of breath and wheezing.    Cardiovascular:  Negative for chest pain and leg swelling.   Gastrointestinal:  Negative for abdominal pain, blood in stool, constipation, diarrhea, nausea and vomiting.   Genitourinary:  Negative for difficulty urinating.   Musculoskeletal:  Positive for arthralgias (left  knee pain). Negative for myalgias.   Skin:  Negative for rash.   Neurological:  Negative for dizziness, syncope, light-headedness and headaches.   Psychiatric/Behavioral:  Negative for self-injury, sleep disturbance and suicidal ideas. The patient is not nervous/anxious.      Medical History Reviewed by provider this encounter:  Allergies  Meds  Problems       Annual Wellness Visit Questionnaire   Silvia is here for her Subsequent Wellness visit.     Health Risk Assessment:   Patient rates overall health as very good. Patient feels that their physical health rating is same. Patient is very satisfied with their life. Eyesight was rated as same. Hearing was rated as same. Patient feels that their emotional and mental health rating is same. Patients states they are never, rarely angry. Patient states they are sometimes unusually tired/fatigued. Pain experienced in the last 7 days has been none. Patient states that she has experienced no weight loss or gain in  last 6 months.     Fall Risk Screening:   In the past year, patient has experienced: no history of falling in past year      Urinary Incontinence Screening:   Patient has not leaked urine accidently in the last six months.     Home Safety:  Patient does not have trouble with stairs inside or outside of their home. Patient has working smoke alarms and has working carbon monoxide detector. Home safety hazards include: none.     Nutrition:   Current diet is Regular.     Medications:   Patient is currently taking over-the-counter supplements. OTC medications include: see medication list. Patient is able to manage medications.     Activities of Daily Living (ADLs)/Instrumental Activities of Daily Living (IADLs):   Walk and transfer into and out of bed and chair?: Yes  Dress and groom yourself?: Yes    Bathe or shower yourself?: Yes    Feed yourself? Yes  Do your laundry/housekeeping?: Yes  Manage your money, pay your bills and track your expenses?: Yes  Make your own meals?: Yes    Do your own shopping?: Yes    Previous Hospitalizations:   Any hospitalizations or ED visits within the last 12 months?: No      Advance Care Planning:   Living will: Yes    Durable POA for healthcare: Yes    Advanced directive: Yes      Cognitive Screening:   Provider or family/friend/caregiver concerned regarding cognition?: No    PREVENTIVE SCREENINGS      Cardiovascular Screening:    General: History Lipid Disorder and Screening Current      Diabetes Screening:     General: Screening Current      Colorectal Cancer Screening:     General: Screening Not Indicated      Breast Cancer Screening:     General: Screening Current      Cervical Cancer Screening:    General: Screening Not Indicated      Osteoporosis Screening:    General: Screening Current      Lung Cancer Screening:     General: Screening Not Indicated    Screening, Brief Intervention, and Referral to Treatment (SBIRT)    Screening  Typical number of drinks in a day: 0  Typical  "number of drinks in a week: 0  Interpretation: Low risk drinking behavior.    AUDIT-C Screenin) How often did you have a drink containing alcohol in the past year? never  2) How many drinks did you have on a typical day when you were drinking in the past year? 0  3) How often did you have 6 or more drinks on one occasion in the past year? never    AUDIT-C Score: 0  Interpretation: Score 0-2 (female): Negative screen for alcohol misuse    Single Item Drug Screening:  How often have you used an illegal drug (including marijuana) or a prescription medication for non-medical reasons in the past year? never    Single Item Drug Screen Score: 0  Interpretation: Negative screen for possible drug use disorder    Brief Intervention  Alcohol & drug use screenings were reviewed. No concerns regarding substance use disorder identified.     Other Counseling Topics:   Regular weightbearing exercise.     Social Determinants of Health     Financial Resource Strain: Low Risk  (2023)    Overall Financial Resource Strain (CARDIA)     Difficulty of Paying Living Expenses: Not hard at all   Food Insecurity: No Food Insecurity (2024)    Hunger Vital Sign     Worried About Running Out of Food in the Last Year: Never true     Ran Out of Food in the Last Year: Never true   Transportation Needs: No Transportation Needs (2024)    PRAPARE - Transportation     Lack of Transportation (Medical): No     Lack of Transportation (Non-Medical): No   Housing Stability: Low Risk  (2024)    Housing Stability Vital Sign     Unable to Pay for Housing in the Last Year: No     Number of Times Moved in the Last Year: 0     Homeless in the Last Year: No   Utilities: Not At Risk (2024)    [facility] Utilities     Threatened with loss of utilities: No     No results found.    Objective     /72 (BP Location: Left arm, Patient Position: Sitting, Cuff Size: Large)   Pulse 78   Temp 97.7 °F (36.5 °C) (Temporal)   Ht 5' 3\" (1.6 m)  "  Wt 64.5 kg (142 lb 3.2 oz)   SpO2 94%   Breastfeeding No   BMI 25.19 kg/m²     Physical Exam  Vitals and nursing note reviewed.   Constitutional:       General: She is not in acute distress.     Appearance: Normal appearance. She is well-developed. She is not ill-appearing.   HENT:      Head: Normocephalic and atraumatic.      Right Ear: Tympanic membrane, ear canal and external ear normal.      Left Ear: Tympanic membrane, ear canal and external ear normal.   Eyes:      Conjunctiva/sclera: Conjunctivae normal.      Pupils: Pupils are equal, round, and reactive to light.   Neck:      Thyroid: No thyromegaly.      Vascular: No carotid bruit.   Cardiovascular:      Rate and Rhythm: Normal rate and regular rhythm.      Heart sounds: Normal heart sounds. No murmur heard.  Pulmonary:      Effort: Pulmonary effort is normal.      Breath sounds: Normal breath sounds. No wheezing.   Abdominal:      General: Bowel sounds are normal.      Palpations: Abdomen is soft. There is no mass.      Tenderness: There is no abdominal tenderness.   Musculoskeletal:      Right lower leg: No edema.      Left lower leg: No edema.   Lymphadenopathy:      Cervical: No cervical adenopathy.   Skin:     General: Skin is warm and dry.      Findings: No rash.   Neurological:      General: No focal deficit present.      Mental Status: She is alert and oriented to person, place, and time.      Cranial Nerves: No cranial nerve deficit.      Deep Tendon Reflexes: Reflexes are normal and symmetric.   Psychiatric:         Mood and Affect: Mood normal.         Behavior: Behavior normal.         Thought Content: Thought content normal.         Judgment: Judgment normal.

## 2024-11-11 NOTE — ASSESSMENT & PLAN NOTE
Stable on Spiriva daily.  Continue as needed Proventil  Orders:    tiotropium (SPIRIVA) 18 mcg inhalation capsule; Place 1 capsule (18 mcg total) into inhaler and inhale daily    albuterol (ProAir HFA) 90 mcg/act inhaler; Inhale 2 puffs every 4 (four) hours as needed for shortness of breath

## 2024-11-11 NOTE — PATIENT INSTRUCTIONS
Medicare Preventive Visit Patient Instructions  Thank you for completing your Welcome to Medicare Visit or Medicare Annual Wellness Visit today. Your next wellness visit will be due in one year (11/12/2025).  The screening/preventive services that you may require over the next 5-10 years are detailed below. Some tests may not apply to you based off risk factors and/or age. Screening tests ordered at today's visit but not completed yet may show as past due. Also, please note that scanned in results may not display below.  Preventive Screenings:  Service Recommendations Previous Testing/Comments   Colorectal Cancer Screening  * Colonoscopy    * Fecal Occult Blood Test (FOBT)/Fecal Immunochemical Test (FIT)  * Fecal DNA/Cologuard Test  * Flexible Sigmoidoscopy Age: 45-75 years old   Colonoscopy: every 10 years (may be performed more frequently if at higher risk)  OR  FOBT/FIT: every 1 year  OR  Cologuard: every 3 years  OR  Sigmoidoscopy: every 5 years  Screening may be recommended earlier than age 45 if at higher risk for colorectal cancer. Also, an individualized decision between you and your healthcare provider will decide whether screening between the ages of 76-85 would be appropriate. Colonoscopy: 08/07/2018  FOBT/FIT: Not on file  Cologuard: Not on file  Sigmoidoscopy: Not on file          Breast Cancer Screening Age: 40+ years old  Frequency: every 1-2 years  Not required if history of left and right mastectomy Mammogram: 11/03/2023    Screening Current   Cervical Cancer Screening Between the ages of 21-29, pap smear recommended once every 3 years.   Between the ages of 30-65, can perform pap smear with HPV co-testing every 5 years.   Recommendations may differ for women with a history of total hysterectomy, cervical cancer, or abnormal pap smears in past. Pap Smear: Not on file    Screening Not Indicated   Hepatitis C Screening Once for adults born between 1945 and 1965  More frequently in patients at high  risk for Hepatitis C Hep C Antibody: Not on file        Diabetes Screening 1-2 times per year if you're at risk for diabetes or have pre-diabetes Fasting glucose: No results in last 5 years (No results in last 5 years)  A1C: No results in last 5 years (No results in last 5 years)  Screening Current   Cholesterol Screening Once every 5 years if you don't have a lipid disorder. May order more often based on risk factors. Lipid panel: 10/15/2024    Screening Not Indicated  History Lipid Disorder     Other Preventive Screenings Covered by Medicare:  Abdominal Aortic Aneurysm (AAA) Screening: covered once if your at risk. You're considered to be at risk if you have a family history of AAA.  Lung Cancer Screening: covers low dose CT scan once per year if you meet all of the following conditions: (1) Age 55-77; (2) No signs or symptoms of lung cancer; (3) Current smoker or have quit smoking within the last 15 years; (4) You have a tobacco smoking history of at least 20 pack years (packs per day multiplied by number of years you smoked); (5) You get a written order from a healthcare provider.  Glaucoma Screening: covered annually if you're considered high risk: (1) You have diabetes OR (2) Family history of glaucoma OR (3)  aged 50 and older OR (4)  American aged 65 and older  Osteoporosis Screening: covered every 2 years if you meet one of the following conditions: (1) You're estrogen deficient and at risk for osteoporosis based off medical history and other findings; (2) Have a vertebral abnormality; (3) On glucocorticoid therapy for more than 3 months; (4) Have primary hyperparathyroidism; (5) On osteoporosis medications and need to assess response to drug therapy.   Last bone density test (DXA Scan): 04/15/2024.  HIV Screening: covered annually if you're between the age of 15-65. Also covered annually if you are younger than 15 and older than 65 with risk factors for HIV infection. For pregnant  patients, it is covered up to 3 times per pregnancy.    Immunizations:  Immunization Recommendations   Influenza Vaccine Annual influenza vaccination during flu season is recommended for all persons aged >= 6 months who do not have contraindications   Pneumococcal Vaccine   * Pneumococcal conjugate vaccine = PCV13 (Prevnar 13), PCV15 (Vaxneuvance), PCV20 (Prevnar 20)  * Pneumococcal polysaccharide vaccine = PPSV23 (Pneumovax) Adults 19-63 yo with certain risk factors or if 65+ yo  If never received any pneumonia vaccine: recommend Prevnar 20 (PCV20)  Give PCV20 if previously received 1 dose of PCV13 or PPSV23   Hepatitis B Vaccine 3 dose series if at intermediate or high risk (ex: diabetes, end stage renal disease, liver disease)   Respiratory syncytial virus (RSV) Vaccine - COVERED BY MEDICARE PART D  * RSVPreF3 (Arexvy) CDC recommends that adults 60 years of age and older may receive a single dose of RSV vaccine using shared clinical decision-making (SCDM)   Tetanus (Td) Vaccine - COST NOT COVERED BY MEDICARE PART B Following completion of primary series, a booster dose should be given every 10 years to maintain immunity against tetanus. Td may also be given as tetanus wound prophylaxis.   Tdap Vaccine - COST NOT COVERED BY MEDICARE PART B Recommended at least once for all adults. For pregnant patients, recommended with each pregnancy.   Shingles Vaccine (Shingrix) - COST NOT COVERED BY MEDICARE PART B  2 shot series recommended in those 19 years and older who have or will have weakened immune systems or those 50 years and older     Health Maintenance Due:      Topic Date Due   • Hepatitis C Screening  Never done   • Lung Cancer Screening  10/14/2025   • DXA SCAN  04/15/2026   • Colorectal Cancer Screening  Discontinued     Immunizations Due:      Topic Date Due   • Influenza Vaccine (1) 09/01/2024     Advance Directives   What are advance directives?  Advance directives are legal documents that state your wishes  and plans for medical care. These plans are made ahead of time in case you lose your ability to make decisions for yourself. Advance directives can apply to any medical decision, such as the treatments you want, and if you want to donate organs.   What are the types of advance directives?  There are many types of advance directives, and each state has rules about how to use them. You may choose a combination of any of the following:  Living will:  This is a written record of the treatment you want. You can also choose which treatments you do not want, which to limit, and which to stop at a certain time. This includes surgery, medicine, IV fluid, and tube feedings.   Durable power of  for healthcare (DPAHC):  This is a written record that states who you want to make healthcare choices for you when you are unable to make them for yourself. This person, called a proxy, is usually a family member or a friend. You may choose more than 1 proxy.  Do not resuscitate (DNR) order:  A DNR order is used in case your heart stops beating or you stop breathing. It is a request not to have certain forms of treatment, such as CPR. A DNR order may be included in other types of advance directives.  Medical directive:  This covers the care that you want if you are in a coma, near death, or unable to make decisions for yourself. You can list the treatments you want for each condition. Treatment may include pain medicine, surgery, blood transfusions, dialysis, IV or tube feedings, and a ventilator (breathing machine).  Values history:  This document has questions about your views, beliefs, and how you feel and think about life. This information can help others choose the care that you would choose.  Why are advance directives important?  An advance directive helps you control your care. Although spoken wishes may be used, it is better to have your wishes written down. Spoken wishes can be misunderstood, or not followed.  Treatments may be given even if you do not want them. An advance directive may make it easier for your family to make difficult choices about your care.   Weight Management   Why it is important to manage your weight:  Being overweight increases your risk of health conditions such as heart disease, high blood pressure, type 2 diabetes, and certain types of cancer. It can also increase your risk for osteoarthritis, sleep apnea, and other respiratory problems. Aim for a slow, steady weight loss. Even a small amount of weight loss can lower your risk of health problems.  How to lose weight safely:  A safe and healthy way to lose weight is to eat fewer calories and get regular exercise. You can lose up about 1 pound a week by decreasing the number of calories you eat by 500 calories each day.   Healthy meal plan for weight management:  A healthy meal plan includes a variety of foods, contains fewer calories, and helps you stay healthy. A healthy meal plan includes the following:  Eat whole-grain foods more often.  A healthy meal plan should contain fiber. Fiber is the part of grains, fruits, and vegetables that is not broken down by your body. Whole-grain foods are healthy and provide extra fiber in your diet. Some examples of whole-grain foods are whole-wheat breads and pastas, oatmeal, brown rice, and bulgur.  Eat a variety of vegetables every day.  Include dark, leafy greens such as spinach, kale, sonu greens, and mustard greens. Eat yellow and orange vegetables such as carrots, sweet potatoes, and winter squash.   Eat a variety of fruits every day.  Choose fresh or canned fruit (canned in its own juice or light syrup) instead of juice. Fruit juice has very little or no fiber.  Eat low-fat dairy foods.  Drink fat-free (skim) milk or 1% milk. Eat fat-free yogurt and low-fat cottage cheese. Try low-fat cheeses such as mozzarella and other reduced-fat cheeses.  Choose meat and other protein foods that are low in fat.   Choose beans or other legumes such as split peas or lentils. Choose fish, skinless poultry (chicken or turkey), or lean cuts of red meat (beef or pork). Before you cook meat or poultry, cut off any visible fat.   Use less fat and oil.  Try baking foods instead of frying them. Add less fat, such as margarine, sour cream, regular salad dressing and mayonnaise to foods. Eat fewer high-fat foods. Some examples of high-fat foods include french fries, doughnuts, ice cream, and cakes.  Eat fewer sweets.  Limit foods and drinks that are high in sugar. This includes candy, cookies, regular soda, and sweetened drinks.  Exercise:  Exercise at least 30 minutes per day on most days of the week. Some examples of exercise include walking, biking, dancing, and swimming. You can also fit in more physical activity by taking the stairs instead of the elevator or parking farther away from stores. Ask your healthcare provider about the best exercise plan for you.      © Copyright [employer] 2018 Information is for End User's use only and may not be sold, redistributed or otherwise used for commercial purposes. All illustrations and images included in CareNotes® are the copyrighted property of [employer] Inc[employer] or [employer]

## 2024-11-11 NOTE — ASSESSMENT & PLAN NOTE
Orders:    tiotropium (SPIRIVA) 18 mcg inhalation capsule; Place 1 capsule (18 mcg total) into inhaler and inhale daily    albuterol (ProAir HFA) 90 mcg/act inhaler; Inhale 2 puffs every 4 (four) hours as needed for shortness of breath

## 2024-11-13 ENCOUNTER — TELEPHONE (OUTPATIENT)
Age: 79
End: 2024-11-13

## 2024-11-13 ENCOUNTER — APPOINTMENT (EMERGENCY)
Dept: RADIOLOGY | Facility: HOSPITAL | Age: 79
End: 2024-11-13
Payer: COMMERCIAL

## 2024-11-13 ENCOUNTER — RESULTS FOLLOW-UP (OUTPATIENT)
Dept: FAMILY MEDICINE CLINIC | Facility: CLINIC | Age: 79
End: 2024-11-13

## 2024-11-13 ENCOUNTER — HOSPITAL ENCOUNTER (EMERGENCY)
Facility: HOSPITAL | Age: 79
Discharge: HOME | End: 2024-11-13
Attending: EMERGENCY MEDICINE
Payer: COMMERCIAL

## 2024-11-13 VITALS
RESPIRATION RATE: 16 BRPM | SYSTOLIC BLOOD PRESSURE: 146 MMHG | DIASTOLIC BLOOD PRESSURE: 102 MMHG | OXYGEN SATURATION: 96 % | BODY MASS INDEX: 24.99 KG/M2 | HEART RATE: 66 BPM | WEIGHT: 141.09 LBS | TEMPERATURE: 98.1 F

## 2024-11-13 DIAGNOSIS — M23.304 DERANGEMENT OF MEDIAL MENISCUS OF LEFT KNEE: Primary | ICD-10-CM

## 2024-11-13 PROCEDURE — 99284 EMERGENCY DEPT VISIT MOD MDM: CPT | Performed by: EMERGENCY MEDICINE

## 2024-11-13 PROCEDURE — 99283 EMERGENCY DEPT VISIT LOW MDM: CPT

## 2024-11-13 PROCEDURE — 73564 X-RAY EXAM KNEE 4 OR MORE: CPT

## 2024-11-13 PROCEDURE — 96372 THER/PROPH/DIAG INJ SC/IM: CPT

## 2024-11-13 RX ORDER — KETOROLAC TROMETHAMINE 30 MG/ML
15 INJECTION, SOLUTION INTRAMUSCULAR; INTRAVENOUS ONCE
Status: COMPLETED | OUTPATIENT
Start: 2024-11-13 | End: 2024-11-13

## 2024-11-13 RX ORDER — HYDROCODONE BITARTRATE AND ACETAMINOPHEN 5; 325 MG/1; MG/1
1 TABLET ORAL EVERY 6 HOURS PRN
Qty: 15 TABLET | Refills: 0 | Status: SHIPPED | OUTPATIENT
Start: 2024-11-13

## 2024-11-13 RX ADMIN — KETOROLAC TROMETHAMINE 15 MG: 30 INJECTION, SOLUTION INTRAMUSCULAR; INTRAVENOUS at 10:30

## 2024-11-13 NOTE — TELEPHONE ENCOUNTER
Spoke to patient and she is in agreement that if anything opens up tmrw or next Wed we would call her and she would come in.   Her appt is on Thursday the 21rst.

## 2024-11-13 NOTE — TELEPHONE ENCOUNTER
Caller: Patient     Doctor: Clementina    Reason for call: Patient was in the ED today for her left knee. She is scheduled to see Dr. Mcrae on 11/21/24 and asking if he could possibly see her sooner. She will go to any office.        Call back#: [phone]

## 2024-11-13 NOTE — ED ATTENDING ATTESTATION
11/13/2024  I, Oswald Avitia MD, saw and evaluated the patient. I have discussed the patient with the resident/non-physician practitioner and agree with the resident's/non-physician practitioner's findings, Plan of Care, and MDM as documented in the resident's/non-physician practitioner's note, except where noted. All available labs and Radiology studies were reviewed.  I was present for key portions of any procedure(s) performed by the resident/non-physician practitioner and I was immediately available to provide assistance.       At this point I agree with the current assessment done in the Emergency Department.  I have conducted an independent evaluation of this patient a history and physical is as follows:    S:  Chief Complaint   Patient presents with    Knee Injury     Pt injured L knee on Nov 6, had xrays and was doing RICE for tx. Yesterday L knee buckled in driveway, pain and trouble putting weight on leg since. No fall.      Silvia is a 79 y.o. female who presents with the chief complaint of left knee pain.  She first started having pain after twisting her knee while walking down some stairs about a week ago.  She was seen at [facility] and had an xray performed which did not reveal any acute injury but did reveal degenerative changes with medial compartment narrowing.  Yesterday she had a buckling/twisting injury again while walking in her driveway.  She did not fall during either instance.  No significant swelling.  Pain is over the medial aspect of the knee, worse with walking, twisting.      O:  ED Triage Vitals [11/13/24 0909]   Temperature Pulse Respirations Blood Pressure SpO2   98.1 °F (36.7 °C) 66 16 (!) 146/102 96 %      Temp Source Heart Rate Source Patient Position - Orthostatic VS BP Location FiO2 (%)   Oral Monitor Sitting Right arm --      Pain Score       9         Physical Exam  Vitals and nursing note reviewed.   Constitutional:       General: She is in acute distress.       Appearance: She is well-developed.   HENT:      Head: Normocephalic and atraumatic.   Eyes:      Pupils: Pupils are equal, round, and reactive to light.   Neck:      Vascular: No JVD.   Cardiovascular:      Rate and Rhythm: Normal rate and regular rhythm.      Heart sounds: Normal heart sounds. No murmur heard.     No friction rub. No gallop.   Pulmonary:      Effort: Pulmonary effort is normal. No respiratory distress.      Breath sounds: Normal breath sounds. No wheezing or rales.   Chest:      Chest wall: No tenderness.   Musculoskeletal:         General: No swelling or deformity. Normal range of motion.      Cervical back: Normal range of motion.      Left knee: No effusion. Tenderness present. No LCL laxity, MCL laxity or ACL laxity.     Instability Tests: Medial Jojo test positive.   Skin:     General: Skin is warm and dry.   Neurological:      General: No focal deficit present.      Mental Status: She is alert and oriented to person, place, and time.   Psychiatric:         Behavior: Behavior normal.         Thought Content: Thought content normal.         Judgment: Judgment normal.       A/P:  Background: 79 y.o. female presents with left knee pain    Differential DX includes but is not limited to: meniscus injury, doubt ligamentous injury. Possible occult tibial plateau fracture    Plan: xray, knee immobilizer, symptom control, ortho referral     ED Course     Labs Reviewed - No data to display  XR knee 4+ vw left injury   Final Result      No acute osseous abnormality. If there is clinical concern for instability of the knee joint, consider follow-up MRI for further evaluation of ligaments         Computerized Assisted Algorithm (CAA) may have been used to analyze all applicable images.         Workstation performed: [id number]         This film was interpreted independently by me.  No fracture or dislocation.      Medications   ketorolac (TORADOL) injection 15 mg (15 mg Intramuscular Given 11/13/24  1030)         Critical Care Time  Procedures    Time reflects when diagnosis was documented in both MDM as applicable and the Disposition within this note       Time User Action Codes Description Comment    11/13/2024 11:09 AM Oswald Avitia Add [M23.307] Degeneration of meniscus of left knee     11/13/2024 11:09 AM Oswald Avitia Remove [M23.307] Degeneration of meniscus of left knee     11/13/2024 11:10 AM Oswald Avitia Add [M23.304] Derangement of medial meniscus of left knee           ED Disposition       ED Disposition   Discharge    Condition   Stable    Date/Time   Wed Nov 13, 2024 11:08 AM    Comment   Silvia Tilley discharge to home/self care.                   Follow-up Information       Follow up With Specialties Details Why Contact Info Additional Information    [facility] Schedule an appointment as soon as possible for a visit in 3 days  [address] [id number] [phone] [facility] [address] [facility], [place], [address] [phone]

## 2024-11-15 NOTE — ED PROVIDER NOTES
Time reflects when diagnosis was documented in both MDM as applicable and the Disposition within this note       Time User Action Codes Description Comment    11/13/2024 11:09 AM Oswald Avitia Add [M23.307] Degeneration of meniscus of left knee     11/13/2024 11:09 AM Oswald Avitia Remove [M23.307] Degeneration of meniscus of left knee     11/13/2024 11:10 AM Oswald Avitia Add [M23.304] Derangement of medial meniscus of left knee           ED Disposition       ED Disposition   Discharge    Condition   Stable    Date/Time   Wed Nov 13, 2024 11:08 AM    Comment   Silvia Tilley discharge to home/self care.                   Assessment & Plan       Medical Decision Making  Amount and/or Complexity of Data Reviewed  Radiology: ordered and independent interpretation performed.    Risk  Prescription drug management.      Silvia Tilley is a 79 year old female presenting to the ED for left knee pain. Differential includes medial meniscal tear, fracture, small effusion of knee, musculoskeletal strain.     Patient's Xrays with no signs of fx, dislocation or effusion.   Patient with positive valgus stress test on exam indicating medial meniscal injury.   Patient given Toradol 15mg for pain control while in ED.   Patient's knee placed in immobilization brace and given referral to orthopedic surgery along with rx for Narco.    Dispo: discharge to home  Prescriptions: Narco    Discussed results and plan with patient. Patient was agreeable and expressed understanding. Discussed return precautions.         Medications   ketorolac (TORADOL) injection 15 mg (15 mg Intramuscular Given 11/13/24 1030)       ED Risk Strat Scores             SBIRT 22yo+      Flowsheet Row Most Recent Value   Initial Alcohol Screen: US AUDIT-C     1. How often do you have a drink containing alcohol? 0 Filed at: 11/13/2024 0934   2. How many drinks containing alcohol do you have on a typical day you are drinking?  0 Filed at: 11/13/2024 0934   3a. Male UNDER  65: How often do you have five or more drinks on one occasion? 0 Filed at: 2024 09   3b. FEMALE Any Age, or MALE 65+: How often do you have 4 or more drinks on one occassion? 0 Filed at: 2024   Audit-C Score 0 Filed at: 2024   NIKITA: How many times in the past year have you...    Used an illegal drug or used a prescription medication for non-medical reasons? Never Filed at: 2024 09                            History of Present Illness       Chief Complaint   Patient presents with    Knee Injury     Pt injured L knee on , had xrays and was doing RICE for tx. Yesterday L knee buckled in driveway, pain and trouble putting weight on leg since. No fall.        Past Medical History:   Diagnosis Date    Hyperlipemia     Hypertension     Multiple benign polyps of large intestine       Past Surgical History:   Procedure Laterality Date    ARTHROSCOPY KNEE Left     INCISIONAL BREAST BIOPSY      SKIN LESION EXCISION      lips benign    TUBAL LIGATION      VARICOSE VEIN SURGERY      ligation      Family History   Problem Relation Age of Onset    Hypertension Mother     Pancreatic cancer Mother     Other Father         MVA with significant injury    Breast cancer Sister     Lung cancer Brother       Social History     Tobacco Use    Smoking status: Former     Current packs/day: 0.00     Average packs/day: 1 pack/day for 49.0 years (49.0 ttl pk-yrs)     Types: Cigarettes     Start date:      Quit date:      Years since quittin.8     Passive exposure: Past    Smokeless tobacco: Never   Vaping Use    Vaping status: Never Used   Substance Use Topics    Alcohol use: Not Currently     Comment: social drinker as per [employer]    Drug use: No      E-Cigarette/Vaping    E-Cigarette Use Never User       E-Cigarette/Vaping Substances    Nicotine No     THC No     Flavoring No     Unknown No       I have reviewed and agree with the history as documented.     VEENA Tilley is a  79 year old female presenting to the ED for left knee pain.  Initially, patient first hurt knee on November 6.  Patient was walking and felt her left knee buckle under her, but did not fall.  Patient was seen by provider the following day which did not show any fractures on x-ray.  She was instructed to RICE, but only did so for 1 day.  She was still able to ambulate well after this first incident.  However, yesterday her left knee gave out under her again while she was walking in her driveway.  Since then, she has had difficulty ambulating well secondary to pain.  She denies any falls, chest pain, shortness of breath, nausea, vomiting, fever, weakness or paresthesias.    Review of Systems   Constitutional:  Negative for chills and fever.   Respiratory:  Negative for cough and shortness of breath.    Cardiovascular:  Negative for chest pain.   Gastrointestinal:  Negative for abdominal pain, nausea and vomiting.   Genitourinary:  Negative for flank pain and pelvic pain.   Musculoskeletal:  Positive for arthralgias. Negative for back pain and neck pain.   Neurological:  Negative for dizziness, weakness, numbness and headaches.           Objective       ED Triage Vitals [11/13/24 0909]   Temperature Pulse Blood Pressure Respirations SpO2 Patient Position - Orthostatic VS   98.1 °F (36.7 °C) 66 (!) 146/102 16 96 % Sitting      Temp Source Heart Rate Source BP Location FiO2 (%) Pain Score    Oral Monitor Right arm -- 9      Vitals      Date and Time Temp Pulse SpO2 Resp BP Pain Score FACES Pain Rating User   11/13/24 0909 98.1 °F (36.7 °C) 66 96 % 16 146/102 9 -- LD            Physical Exam  Constitutional:       Appearance: She is normal weight.   HENT:      Head: Normocephalic and atraumatic.   Eyes:      Extraocular Movements: Extraocular movements intact.      Pupils: Pupils are equal, round, and reactive to light.   Cardiovascular:      Rate and Rhythm: Normal rate and regular rhythm.      Pulses: Normal pulses.       Heart sounds: Normal heart sounds.   Pulmonary:      Effort: Pulmonary effort is normal. No respiratory distress.   Abdominal:      Palpations: Abdomen is soft.      Tenderness: There is no abdominal tenderness.   Musculoskeletal:         General: Swelling (Mild swelling over medial aspect of left knee.) and tenderness (Medial aspect of left knee tender to palpation.) present. No deformity.      Cervical back: Normal range of motion. No rigidity.      Comments: Negative anterior and posterior drawer test.  Negative valgus stress test.  Patient with increased pain on valgus stress test.  Patient is still able to flex and extend her knee though slowly secondary to pain.   Skin:     General: Skin is warm and dry.      Capillary Refill: Capillary refill takes less than 2 seconds.   Neurological:      General: No focal deficit present.      Mental Status: She is alert and oriented to person, place, and time. Mental status is at baseline.      Sensory: No sensory deficit.      Motor: No weakness.         Results Reviewed       None            XR knee 4+ vw left injury   Final Interpretation by Walter Murphy MD (11/13 6442)      No acute osseous abnormality. If there is clinical concern for instability of the knee joint, consider follow-up MRI for further evaluation of ligaments         Computerized Assisted Algorithm (CAA) may have been used to analyze all applicable images.         Workstation performed: [id number]             Procedures    ED Medication and Procedure Management   Prior to Admission Medications   Prescriptions Last Dose Informant Patient Reported? Taking?   Calcium Carb-Cholecalciferol (CALCIUM 1000 + D PO)  Self Yes No   Sig: Take by mouth   Multiple Vitamins-Minerals (MULTI FOR HER) TABS  Self Yes No   Sig: Take by mouth   Omega-3 Fatty Acids (FISH OIL) 645 MG CAPS  Self Yes No   Sig: Take by mouth   albuterol (ProAir HFA) 90 mcg/act inhaler   No No   Sig: Inhale 2 puffs every 4 (four) hours  as needed for shortness of breath   coenzyme Q-10 100 MG capsule  Self Yes No   Sig: Take by mouth   lovastatin (MEVACOR) 40 MG tablet  Self No No   Sig: TAKE 1 TABLET DAILY   meclizine (ANTIVERT) 25 mg tablet  Self No No   Sig: Take 1 tablet (25 mg total) by mouth 3 (three) times a day as needed for dizziness   Patient not taking: Reported on 5/1/2024   tiotropium (SPIRIVA) 18 mcg inhalation capsule   No No   Sig: Place 1 capsule (18 mcg total) into inhaler and inhale daily   triamcinolone (KENALOG) 0.1 % oral topical paste  Self Yes No   Sig: Apply to the mouth or throat As needed    Patient not taking: Reported on 6/1/2022   triamterene-hydrochlorothiazide (MAXZIDE-25) 37.5-25 mg per tablet  Self No No   Sig: TAKE 1 TABLET DAILY      Facility-Administered Medications: None     Discharge Medication List as of 11/13/2024 11:11 AM        START taking these medications    Details   HYDROcodone-acetaminophen (NORCO) 5-325 mg per tablet Take 1 tablet by mouth every 6 (six) hours as needed for pain for up to 15 doses Max Daily Amount: 4 tablets, Starting Wed 11/13/2024, Normal           CONTINUE these medications which have NOT CHANGED    Details   albuterol (ProAir HFA) 90 mcg/act inhaler Inhale 2 puffs every 4 (four) hours as needed for shortness of breath, Starting Mon 11/11/2024, Normal      Calcium Carb-Cholecalciferol (CALCIUM 1000 + D PO) Take by mouth, Historical Med      coenzyme Q-10 100 MG capsule Take by mouth, Historical Med      lovastatin (MEVACOR) 40 MG tablet TAKE 1 TABLET DAILY, Starting Wed 10/16/2024, Normal      meclizine (ANTIVERT) 25 mg tablet Take 1 tablet (25 mg total) by mouth 3 (three) times a day as needed for dizziness, Starting Wed 12/14/2022, Normal      Multiple Vitamins-Minerals (MULTI FOR HER) TABS Take by mouth, Historical Med      Omega-3 Fatty Acids (FISH OIL) 645 MG CAPS Take by mouth, Historical Med      tiotropium (SPIRIVA) 18 mcg inhalation capsule Place 1 capsule (18 mcg total)  into inhaler and inhale daily, Starting Mon 11/11/2024, Normal      triamcinolone (KENALOG) 0.1 % oral topical paste Apply to the mouth or throat As needed , Starting Mon 8/25/2014, Historical Med      triamterene-hydrochlorothiazide (MAXZIDE-25) 37.5-25 mg per tablet TAKE 1 TABLET DAILY, Starting Sat 6/15/2024, Normal             ED SEPSIS DOCUMENTATION   Time reflects when diagnosis was documented in both MDM as applicable and the Disposition within this note       Time User Action Codes Description Comment    11/13/2024 11:09 AM Oswald Avitia Add [M23.307] Degeneration of meniscus of left knee     11/13/2024 11:09 AM Oswald Avitia Remove [M23.307] Degeneration of meniscus of left knee     11/13/2024 11:10 AM Oswald Avitia Add [M23.304] Derangement of medial meniscus of left knee                  Terri Avitia MD  11/14/24 2170

## 2024-11-21 ENCOUNTER — OFFICE VISIT (OUTPATIENT)
Dept: OBGYN CLINIC | Facility: MEDICAL CENTER | Age: 79
End: 2024-11-21
Payer: COMMERCIAL

## 2024-11-21 VITALS
HEART RATE: 81 BPM | HEIGHT: 63 IN | WEIGHT: 141 LBS | SYSTOLIC BLOOD PRESSURE: 174 MMHG | BODY MASS INDEX: 24.98 KG/M2 | DIASTOLIC BLOOD PRESSURE: 83 MMHG

## 2024-11-21 DIAGNOSIS — M23.304 DERANGEMENT OF MEDIAL MENISCUS OF LEFT KNEE: Primary | ICD-10-CM

## 2024-11-21 PROCEDURE — 20610 DRAIN/INJ JOINT/BURSA W/O US: CPT | Performed by: PHYSICAL MEDICINE & REHABILITATION

## 2024-11-21 PROCEDURE — 99214 OFFICE O/P EST MOD 30 MIN: CPT | Performed by: PHYSICAL MEDICINE & REHABILITATION

## 2024-11-21 RX ORDER — TRIAMCINOLONE ACETONIDE 40 MG/ML
80 INJECTION, SUSPENSION INTRA-ARTICULAR; INTRAMUSCULAR
Status: COMPLETED | OUTPATIENT
Start: 2024-11-21 | End: 2024-11-21

## 2024-11-21 RX ORDER — ROPIVACAINE HYDROCHLORIDE 5 MG/ML
10 INJECTION, SOLUTION EPIDURAL; INFILTRATION; PERINEURAL
Status: COMPLETED | OUTPATIENT
Start: 2024-11-21 | End: 2024-11-21

## 2024-11-21 RX ADMIN — ROPIVACAINE HYDROCHLORIDE 10 ML: 5 INJECTION, SOLUTION EPIDURAL; INFILTRATION; PERINEURAL at 14:15

## 2024-11-21 RX ADMIN — TRIAMCINOLONE ACETONIDE 80 MG: 40 INJECTION, SUSPENSION INTRA-ARTICULAR; INTRAMUSCULAR at 14:15

## 2024-11-21 NOTE — PROGRESS NOTES
1. Derangement of medial meniscus of left knee  Ambulatory Referral to Orthopedic Surgery    Ambulatory referral to Physical Therapy        Orders Placed This Encounter   Procedures    Large joint arthrocentesis    Ambulatory referral to Physical Therapy        Impression:  Patient is here in follow up of left posterior calf pain likely secondary to tennis leg date of injury as 11/3/2024.  This has resolved.    She recently injured her left knee this past 11/13/2024.  Exam is consistent with medial meniscal derangement.  We proceeded with an intra-articular steroid injection.  We provided her with a hinged knee brace.  She will start physical therapy.  I will see her back in about 3-4 weeks to reassess.      Imaging Studies (I personally reviewed images in PACS and report):  Left knee x-rays most recent to this encounter reviewed.  These images show mild osteoarthritis.  No significant joint effusion.    No follow-ups on file.    Patient is in agreement with the above plan.    The patient's pertinent emergency department/urgent care/referring physician's notes were reviewed.    HPI:  Silvia Tilley is a 79 y.o. female  who presents in follow up.  Here for   Chief Complaint   Patient presents with    Left Knee - Pain, Follow-up       Since last visit: See above. She twisted her leg.  She now has medial joint line pain in the knee.     Following history reviewed and updated:  Past Medical History:   Diagnosis Date    Hyperlipemia     Hypertension     Multiple benign polyps of large intestine      Past Surgical History:   Procedure Laterality Date    ARTHROSCOPY KNEE Left     INCISIONAL BREAST BIOPSY      SKIN LESION EXCISION      lips benign    TUBAL LIGATION  2/73    VARICOSE VEIN SURGERY      ligation     Social History   Social History     Substance and Sexual Activity   Alcohol Use Not Currently    Comment: social drinker as per [employer]     Social History     Substance and Sexual Activity   Drug Use No     Social  "History     Tobacco Use   Smoking Status Former    Current packs/day: 0.00    Average packs/day: 1 pack/day for 49.0 years (49.0 ttl pk-yrs)    Types: Cigarettes    Start date:     Quit date:     Years since quittin.8    Passive exposure: Past   Smokeless Tobacco Never     Family History   Problem Relation Age of Onset    Hypertension Mother     Pancreatic cancer Mother     Other Father         MVA with significant injury    Breast cancer Sister     Lung cancer Brother      No Known Allergies     Constitutional:  BP (!) 174/83   Pulse 81   Ht 5' 3\" (1.6 m)   Wt 64 kg (141 lb)   BMI 24.98 kg/m²    General: NAD.  Eyes: Clear sclerae.  ENT: No inflammation, lesion, or mass of lips.  No tracheal deviation.  Musculoskeletal: As mentioned below.  Integumentary: No visible rashes or skin lesions.  Pulmonary/Chest: Effort normal. No respiratory distress.   Neuro: CN's grossly intact, POST.  Psych: Normal affect and judgement.  Vascular: WWP.    Left Knee Exam     Tenderness   The patient is experiencing tenderness in the medial joint line.    Range of Motion   Extension:  normal     Other   Erythema: absent  Scars: absent  Sensation: normal  Pulse: present  Swelling: none  Effusion: no effusion present             Large joint arthrocentesis: L knee  Universal Protocol:  procedure performed by consultantConsent: Verbal consent obtained. Written consent not obtained.  Risks and benefits: risks, benefits and alternatives were discussed  Consent given by: patient  Timeout called at: 2024 2:25 PM.  Patient understanding: patient states understanding of the procedure being performed  Patient consent: the patient's understanding of the procedure matches consent given  Site marked: the operative site was marked  Radiology Images displayed and confirmed. If images not available, report reviewed: imaging studies available  Patient identity confirmed: verbally with patient  Supporting Documentation  Indications: " pain   Procedure Details  Location: knee - L knee  Needle size: 22 G  Ultrasound guidance: no  Approach: Inferolateral to patella.  Medications administered: 80 mg triamcinolone acetonide 40 mg/mL; 10 mL ropivacaine 0.5 %    Patient tolerance: patient tolerated the procedure well with no immediate complications  Dressing:  Sterile dressing applied    There was little to no resistance encountered during the injection.    Risks of this procedure include:    - Risk of bleeding since a needle is involved.  - Risk of infection (1/10,000 chance as per recent studies).  Signs/symptoms were discussed and they would prompt an urgent evaluation at an emergency department.  - Risk of pigmentation or skin dimpling in the skin (2-3% chance as per recent studies) from the steroid.  - Risk of increased pain from steroid flare (1% chance as per recent studies) that typically lasts 24-48 hours.  - Risk of increased blood sugars from the steroid medication that can last for a few weeks.  If the patient is a diabetic or pre-diabetic, they were encouraged to closely monitor their blood sugars and discuss with PCP if elevated more than usual or if having symptoms.    The benefits outweigh the risks and so the procedure was completed.

## 2024-12-03 ENCOUNTER — EVALUATION (OUTPATIENT)
Dept: PHYSICAL THERAPY | Facility: MEDICAL CENTER | Age: 79
End: 2024-12-03
Payer: COMMERCIAL

## 2024-12-03 DIAGNOSIS — M23.304 DERANGEMENT OF MEDIAL MENISCUS OF LEFT KNEE: ICD-10-CM

## 2024-12-03 PROCEDURE — 97112 NEUROMUSCULAR REEDUCATION: CPT | Performed by: PHYSICAL THERAPIST

## 2024-12-03 PROCEDURE — 97161 PT EVAL LOW COMPLEX 20 MIN: CPT | Performed by: PHYSICAL THERAPIST

## 2024-12-03 NOTE — PROGRESS NOTES
PT Evaluation     Today's date: 12/3/2024  Patient name: Silvia Tilley  : 1945  MRN: [id number]  Referring provider: Kymberly Mcrae DO  Dx:   Encounter Diagnosis     ICD-10-CM    1. Derangement of medial meniscus of left knee  M23.304 Ambulatory referral to Physical Therapy                     Assessment  Impairments: abnormal gait, abnormal or restricted ROM, abnormal movement, activity intolerance, impaired physical strength, lacks appropriate home exercise program, pain with function, weight-bearing intolerance and unable to perform ADL    Assessment details: Silvia Tilley is a 79 y.o. female who presents with Derangement of medial meniscus of left knee.  Patient presents alert and oriented with the above impairments. . Silvia will benefit from PT to addres deficits in order to maximize and return to prior level of function.  No further referral appears necessary at this time based upon examination results.  Prognosis is good given HEP compliance. Please contact me if you have any questions or recommendations.     Understanding of Dx/Px/POC: good     Prognosis: good    Goals  Short Term Goals:   1. Pain decreased 2 ratings in 4 weeks  2.  ROM increased 10* in 4 weeks  3.  Strength increased 1/2 grade in 4 weeks    Long Term Goals:   1.  ADLS/IADLS in related activities improved to maximal level in 8 weeks  2.  Ambulation is improved to maximal level in 8 weeks  3.  Recreational activities are improved to maximal level in 8 weeks.  4.  [place] with HEP in 8 weeks.     Plan  Patient would benefit from: PT eval and skilled physical therapy    Planned therapy interventions: IASTM, stretching, strengthening, patient/caregiver education, neuromuscular re-education, manual therapy, abdominal trunk stabilization, functional ROM exercises, flexibility, home exercise program, gait training and therapeutic exercise    Frequency: 2x week  Duration in weeks: 8  Treatment plan discussed with: patient        Subjective  "Evaluation    History of Present Illness  Mechanism of injury: Pt reports in early Nov her left knee gave out and a few weeks later it happened again resulting in extreme pain and sig difficulty walking.  She initially went to ED and was placed in immobilizer.  She scheduled w/ ortho and was given injection which only provided minimal relief.  She was also provided w/ brace w/ lateral supports and referred to PT.  She presents today w/ reports of constant pain that is most severe during the day, walking, prolonged sitting.  Pain is located along medial knee.  No reports of sleep disturbance.  She uses [facility] for community ambulation and at times around the house.  She does remain fairly independent w/ daily activity, cooking and cleaning.   Patient Goals  Patient goals for therapy: decreased pain, increased motion, increased strength and independence with ADLs/IADLs    Pain  At best pain ratin  At worst pain ratin          Objective     Palpation     Additional Palpation Details  Tenderness along medial knee/joint line      Active Range of Motion   Left Knee   Flexion: 135 degrees   Extension: -2 degrees     Strength/Myotome Testing     Left Hip   Planes of Motion   Flexion: 4-  Abduction: 4    Right Hip   Planes of Motion   Flexion: 5  Abduction: 5    Left Knee   Flexion: 4  Extension: 4    Right Knee   Flexion: 5  Extension: 5    Ambulation     Comments   Ambulates lacking L TKE at heel strike.      Flowsheet Rows      Flowsheet Row Most Recent Value   PT/OT G-Codes    Current Score 50   Projected Score 68   FOTO information reviewed Yes   Assessment Type Evaluation   G code set Other PT/OT Primary   Other PT Primary Current Status () CK   Other PT Primary Goal Status () CJ               Precautions: none      Manuals                                                                 Neuro Re-Ed 12/3            Quad sets 5\"x10            Adductor squeeze 5\"x10            Hip abd w/ Tband GTB 5\"x10      " "      SLR flex and abd nv            LAQ nv            Seated march nv                                                                             Ther Ex 12/3            Recumbent bike nv            Gastroc strap stretch 30\"x3            Hamstring stretch nv                         Ther Activity                                       Gait Training                                       Modalities                                          Eval/Re-Eval POC Expires Auth #/ Referral # Total Visits Start Date Expiration Date Extension Info Visits Limitation   12/3 2/3 HIGHMARK [id number] 12 12/3 5/31                                                             1 2 3 4 5 6   12/3 F        7 8 9 10 11 12           13 14 15 16 17 18           19 20 21 22 23 24           25 26 27 28 29 30                                 "

## 2024-12-09 ENCOUNTER — OFFICE VISIT (OUTPATIENT)
Dept: PHYSICAL THERAPY | Facility: MEDICAL CENTER | Age: 79
End: 2024-12-09
Payer: COMMERCIAL

## 2024-12-09 DIAGNOSIS — M23.304 DERANGEMENT OF MEDIAL MENISCUS OF LEFT KNEE: Primary | ICD-10-CM

## 2024-12-09 DIAGNOSIS — I10 ESSENTIAL HYPERTENSION: ICD-10-CM

## 2024-12-09 PROCEDURE — 97112 NEUROMUSCULAR REEDUCATION: CPT | Performed by: PHYSICAL THERAPIST

## 2024-12-09 PROCEDURE — 97110 THERAPEUTIC EXERCISES: CPT | Performed by: PHYSICAL THERAPIST

## 2024-12-09 NOTE — PROGRESS NOTES
"Daily Note     Today's date: 2024  Patient name: Silvia Tilley  : 1945  MRN: [id number]  Referring provider: Kymberly Mcrae DO  Dx:   Encounter Diagnosis     ICD-10-CM    1. Derangement of medial meniscus of left knee  M23.304                      Subjective: Pt c/o persistent pain.  Feels stiffness as well from walking guarded and avoiding knee flexion.      Objective: See treatment diary below      Assessment: Tolerated treatment well. Patient demonstrated fatigue post treatment, exhibited good technique with therapeutic exercises, and would benefit from continued PT  Reviewed stair negotiation w/ patient today demonstrating adequate knee flexion w/ cueing.    Plan: Continue per plan of care.      Precautions: none      Manuals                                                                 Neuro Re-Ed 12/3 12/9           Quad sets 5\"x10 5\"x20           Adductor squeeze 5\"x10 5\"x20           Hip abd w/ Tband GTB 5\"x10 GTB 5\"x20           SLR flex and abd nv X10 ea           LAQ nv 5\"x20           Seated march nv 5\"x20                                                                            Ther Ex 12/3 12/9           Recumbent bike nv            Gastroc strap stretch 30\"x3 30\"x3           Hamstring stretch nv 30\"x3                        Ther Activity                                       Gait Training                                       Modalities                                            Eval/Re-Eval POC Expires Auth #/ Referral # Total Visits Start Date Expiration Date Extension Info Visits Limitation   12/3 2/3 HIGHMARK [id number] 12 12/3 5/31                                                                               1 2 3 4 5 6   12/3 F             7 8 9 10 11 12                 13 14 15 16 17 18                 19 20 21 22 23 24                 25 26 27 28 29 30                  "

## 2024-12-09 NOTE — TELEPHONE ENCOUNTER
Reason for call:   [x] Refill   [] Prior Auth  [] Other:     Office:   [x] PCP/Provider -   [] Specialty/Provider -     Medication:  triamterene-hydrochlorothiazide (MAXZIDE-25) 37.5-25 mg per tablet    Dose/Frequency: TAKE 1 TABLET DAILY     Quantity: 90    Pharmacy: [facility] - [address] [phone]    Does the patient have enough for 3 days?   [x] Yes   [] No - Send as HP to POD

## 2024-12-10 RX ORDER — TRIAMTERENE AND HYDROCHLOROTHIAZIDE 37.5; 25 MG/1; MG/1
1 TABLET ORAL DAILY
Qty: 90 TABLET | Refills: 1 | Status: SHIPPED | OUTPATIENT
Start: 2024-12-10

## 2024-12-11 ENCOUNTER — OFFICE VISIT (OUTPATIENT)
Dept: PHYSICAL THERAPY | Facility: MEDICAL CENTER | Age: 79
End: 2024-12-11
Payer: COMMERCIAL

## 2024-12-11 DIAGNOSIS — M23.304 DERANGEMENT OF MEDIAL MENISCUS OF LEFT KNEE: Primary | ICD-10-CM

## 2024-12-11 PROCEDURE — 97110 THERAPEUTIC EXERCISES: CPT | Performed by: PHYSICAL THERAPIST

## 2024-12-11 PROCEDURE — 97112 NEUROMUSCULAR REEDUCATION: CPT | Performed by: PHYSICAL THERAPIST

## 2024-12-11 NOTE — PROGRESS NOTES
"Daily Note     Today's date: 2024  Patient name: Silvia Tilley  : 1945  MRN: [id number]  Referring provider: Kymberly Mcrae DO  Dx:   Encounter Diagnosis     ICD-10-CM    1. Derangement of medial meniscus of left knee  M23.304                        Subjective: Pt reports significant burning in medial knee last night when trying to sleep which did result in sleep disruption throughout the night.  Feels more confident w/ stair negotiation allowing knee flexion.      Objective: See treatment diary below      Assessment: Tolerated treatment well. Patient demonstrated fatigue post treatment, exhibited good technique with therapeutic exercises, and would benefit from continued PT  No increase in knee pain w/ ex program.  Updated HEP today.    Plan: Continue per plan of care.      Precautions: none      Manuals                                                                 Neuro Re-Ed 12/3 12/9 12/11          Quad sets 5\"x10 5\"x20 5\"x20          Adductor squeeze 5\"x10 5\"x20 5\"x20          Hip abd w/ Tband GTB 5\"x10 GTB 5\"x20 GTB 5\"x20          SLR flex and abd nv X10 ea 2x10 ea          LAQ nv 5\"x20 5\"x20          Seated march nv 5\"x20 5\"x20                                                                           Ther Ex 12/3 12/9 12/11          Recumbent bike nv  6 min          Gastroc strap stretch 30\"x3 30\"x3 30\"X3          Hamstring stretch nv 30\"x3 30\"x3                       Ther Activity                                       Gait Training                                       Modalities                                            Eval/Re-Eval POC Expires Auth #/ Referral # Total Visits Start Date Expiration Date Extension Info Visits Limitation   12/3 2/3 HIGHMARK [id number] 12 12/3 5/31                                                                               1 2 3 4 5 6   12/3 F           7 8 9 10 11 12                 13 14 15 16 17 18                 19 20 21 22 23 24                 25 " 26 27 28 29 30

## 2024-12-16 ENCOUNTER — OFFICE VISIT (OUTPATIENT)
Dept: PHYSICAL THERAPY | Facility: MEDICAL CENTER | Age: 79
End: 2024-12-16
Payer: COMMERCIAL

## 2024-12-16 DIAGNOSIS — M23.304 DERANGEMENT OF MEDIAL MENISCUS OF LEFT KNEE: Primary | ICD-10-CM

## 2024-12-16 PROCEDURE — 97110 THERAPEUTIC EXERCISES: CPT | Performed by: PHYSICAL THERAPIST

## 2024-12-16 PROCEDURE — 97112 NEUROMUSCULAR REEDUCATION: CPT | Performed by: PHYSICAL THERAPIST

## 2024-12-16 NOTE — PROGRESS NOTES
"Daily Note     Today's date: 2024  Patient name: Silvia Tilley  : 1945  MRN: [id number]  Referring provider: Kymberly Mcrae DO  Dx:   Encounter Diagnosis     ICD-10-CM    1. Derangement of medial meniscus of left knee  M23.304                        Subjective: Pt reports mild improvements.  She is no longer having severe pain as she was initially.    At times she feels ok in the morning, but pain and stiffness tends to increase as the day progresses.  Some increased discomfort today after cleaning car off.      Objective: See treatment diary below      Assessment: Tolerated treatment well. Patient demonstrated fatigue post treatment, exhibited good technique with therapeutic exercises, and would benefit from continued PT  Added hamstring and gastroc stretching today.    Plan: Continue per plan of care.      Precautions: none      Manuals                                                                 Neuro Re-Ed 12/3 12/9 12/11 12/16         Quad sets 5\"x10 5\"x20 5\"x20 5\"x20         Adductor squeeze 5\"x10 5\"x20 5\"x20 5\"x20         Hip abd w/ Tband GTB 5\"x10 GTB 5\"x20 GTB 5\"x20 GTB 5\"x20         SLR flex and abd nv X10 ea 2x10 ea 2x10 ea         LAQ nv 5\"x20 5\"x20 5\"x20         Seated march nv 5\"x20 5\"x20 5\"x20                                                                          Ther Ex 12/3 12/9 12/11 12/16         Recumbent bike nv  6 min 7 min         Gastroc strap stretch 30\"x3 30\"x3 30\"X3 30\"X3         Hamstring stretch nv 30\"x3 30\"x3 30\"X3                      Ther Activity                                       Gait Training                                       Modalities                                            Eval/Re-Eval POC Expires Auth #/ Referral # Total Visits Start Date Expiration Date Extension Info Visits Limitation   12/3 2/3 [facility]IG[facility]ARK [id number] 12 12/3 5/31                                                                               1 2 3 4 5 6   12/3 F         7 " 8 9 10 11 12                 13 14 15 16 17 18                 19 20 21 22 23 24                 25 26 27 28 29 30

## 2024-12-18 ENCOUNTER — OFFICE VISIT (OUTPATIENT)
Dept: OBGYN CLINIC | Facility: MEDICAL CENTER | Age: 79
End: 2024-12-18
Payer: COMMERCIAL

## 2024-12-18 VITALS — HEIGHT: 63 IN | BODY MASS INDEX: 24.98 KG/M2 | WEIGHT: 141 LBS

## 2024-12-18 DIAGNOSIS — M23.304 DERANGEMENT OF MEDIAL MENISCUS OF LEFT KNEE: Primary | ICD-10-CM

## 2024-12-18 PROCEDURE — 20610 DRAIN/INJ JOINT/BURSA W/O US: CPT | Performed by: PHYSICAL MEDICINE & REHABILITATION

## 2024-12-18 PROCEDURE — 99213 OFFICE O/P EST LOW 20 MIN: CPT | Performed by: PHYSICAL MEDICINE & REHABILITATION

## 2024-12-18 RX ORDER — TRIAMCINOLONE ACETONIDE 40 MG/ML
80 INJECTION, SUSPENSION INTRA-ARTICULAR; INTRAMUSCULAR
Status: COMPLETED | OUTPATIENT
Start: 2024-12-18 | End: 2024-12-18

## 2024-12-18 RX ORDER — ROPIVACAINE HYDROCHLORIDE 5 MG/ML
10 INJECTION, SOLUTION EPIDURAL; INFILTRATION; PERINEURAL
Status: COMPLETED | OUTPATIENT
Start: 2024-12-18 | End: 2024-12-18

## 2024-12-18 RX ADMIN — ROPIVACAINE HYDROCHLORIDE 10 ML: 5 INJECTION, SOLUTION EPIDURAL; INFILTRATION; PERINEURAL at 15:15

## 2024-12-18 RX ADMIN — TRIAMCINOLONE ACETONIDE 80 MG: 40 INJECTION, SUSPENSION INTRA-ARTICULAR; INTRAMUSCULAR at 15:15

## 2024-12-18 NOTE — PROGRESS NOTES
1. Derangement of medial meniscus of left knee  Large joint arthrocentesis: L pes anserine bursa        Orders Placed This Encounter   Procedures    Large joint arthrocentesis: L pes anserine bursa        Impression:  Patient is here in follow up of left posterior calf pain likely secondary to tennis leg date of injury as 11/3/2024.  This has resolved.    She recently injured her left knee on 11/13/2024.  Exam is consistent with medial meniscal derangement.  Treatment has included ntra-articular steroid injection, hinged knee brace and physical therapy.  Today we proceeded with a Pez anserine bursa injection.  She can continue with physical therapy and home exercise program.  I will see her back in mid January if needed.     Imaging Studies (I personally reviewed images in PACS and report):  Left knee x-rays most recent to this encounter reviewed.  These images show mild osteoarthritis.  No significant joint effusion.    No follow-ups on file.    Patient is in agreement with the above plan.    HPI:  Silvia Tilley is a 79 y.o. female  who presents in follow up.  Here for   Chief Complaint   Patient presents with    Left Knee - Follow-up, Pain     Pt reports she is still having some pain and soreness in medial side        Since last visit: See above.    Following history reviewed and updated:  Past Medical History:   Diagnosis Date    Hyperlipemia     Hypertension     Multiple benign polyps of large intestine      Past Surgical History:   Procedure Laterality Date    ARTHROSCOPY KNEE Left     INCISIONAL BREAST BIOPSY      SKIN LESION EXCISION      lips benign    TUBAL LIGATION  2/73    VARICOSE VEIN SURGERY      ligation     Social History   Social History     Substance and Sexual Activity   Alcohol Use Not Currently    Comment: social drinker as per [employer]     Social History     Substance and Sexual Activity   Drug Use No     Social History     Tobacco Use   Smoking Status Former    Current packs/day: 0.00     "Average packs/day: 1 pack/day for 49.0 years (49.0 ttl pk-yrs)    Types: Cigarettes    Start date:     Quit date:     Years since quittin.9    Passive exposure: Past   Smokeless Tobacco Never     Family History   Problem Relation Age of Onset    Hypertension Mother     Pancreatic cancer Mother     Other Father         MVA with significant injury    Breast cancer Sister     Lung cancer Brother      No Known Allergies     Constitutional:  Ht 5' 3\" (1.6 m)   Wt 64 kg (141 lb)   BMI 24.98 kg/m²    General: NAD.  Eyes: Clear sclerae.  ENT: No inflammation, lesion, or mass of lips.  No tracheal deviation.  Musculoskeletal: As mentioned below.  Integumentary: No visible rashes or skin lesions.  Pulmonary/Chest: Effort normal. No respiratory distress.   Neuro: CN's grossly intact, POST.  Psych: Normal affect and judgement.  Vascular: WWP.    Left Knee Exam     Tenderness   The patient is experiencing tenderness in the medial joint line.    Range of Motion   Extension:  normal     Other   Erythema: absent  Scars: absent  Sensation: normal  Pulse: present  Swelling: none  Effusion: no effusion present             Large joint arthrocentesis: L pes anserine bursa  [place] Protocol:  procedure performed by consultantConsent: Verbal consent obtained. Written consent not obtained.  Risks and benefits: risks, benefits and alternatives were discussed  Consent given by: patient  Timeout called at: 2024 3:07 PM.  Patient understanding: patient states understanding of the procedure being performed  Patient consent: the patient's understanding of the procedure matches consent given  Site marked: the operative site was marked  Radiology Images displayed and confirmed. If images not available, report reviewed: imaging studies available  Patient identity confirmed: verbally with patient  Supporting Documentation  Indications: pain   Procedure Details  Location: knee - L pes anserine bursa  Needle size: 22 " G  Ultrasound guidance: no  Approach: Inferolateral to patella.  Medications administered: 80 mg triamcinolone acetonide 40 mg/mL; 10 mL ropivacaine 0.5 %    Patient tolerance: patient tolerated the procedure well with no immediate complications  Dressing:  Sterile dressing applied    There was little to no resistance encountered during the injection.    Risks of this procedure include:    - Risk of bleeding since a needle is involved.  - Risk of infection (1/10,000 chance as per recent studies).  Signs/symptoms were discussed and they would prompt an urgent evaluation at an emergency department.  - Risk of pigmentation or skin dimpling in the skin (2-3% chance as per recent studies) from the steroid.  - Risk of increased pain from steroid flare (1% chance as per recent studies) that typically lasts 24-48 hours.  - Risk of increased blood sugars from the steroid medication that can last for a few weeks.  If the patient is a diabetic or pre-diabetic, they were encouraged to closely monitor their blood sugars and discuss with PCP if elevated more than usual or if having symptoms.    The benefits outweigh the risks and so the procedure was completed.

## 2024-12-19 ENCOUNTER — TELEPHONE (OUTPATIENT)
Dept: FAMILY MEDICINE CLINIC | Facility: CLINIC | Age: 79
End: 2024-12-19

## 2024-12-19 ENCOUNTER — OFFICE VISIT (OUTPATIENT)
Dept: PHYSICAL THERAPY | Facility: MEDICAL CENTER | Age: 79
End: 2024-12-19
Payer: COMMERCIAL

## 2024-12-19 DIAGNOSIS — M23.304 DERANGEMENT OF MEDIAL MENISCUS OF LEFT KNEE: Primary | ICD-10-CM

## 2024-12-19 PROCEDURE — 97110 THERAPEUTIC EXERCISES: CPT | Performed by: PHYSICAL THERAPIST

## 2024-12-19 PROCEDURE — 97112 NEUROMUSCULAR REEDUCATION: CPT | Performed by: PHYSICAL THERAPIST

## 2024-12-19 NOTE — TELEPHONE ENCOUNTER
Patient stopped by office asking if it would be possible to obtain a handicap parking placcard. COPD and torn meniscus. Please advise

## 2024-12-19 NOTE — PROGRESS NOTES
"Daily Note     Today's date: 2024  Patient name: Silvia Tilley  : 1945  MRN: [id number]  Referring provider: Kymberly Mcrae DO  Dx:   Encounter Diagnosis     ICD-10-CM    1. Derangement of medial meniscus of left knee  M23.304                        Subjective: Pt had ortho f/u yesterday; was given injection to address pes anserine bursitis.  Advised to continue w/ PT and HEP.  She c/o medial knee soreness today w/ stiffness      Objective: See treatment diary below      Assessment: Tolerated treatment well. Patient demonstrated fatigue post treatment, exhibited good technique with therapeutic exercises, and would benefit from continued PT  Plan to progress ex program next visit.    Plan: Continue per plan of care.      Precautions: none      Manuals                                                                 Neuro Re-Ed 12/3 12/9 12/11 12/16 12/19        Quad sets 5\"x10 5\"x20 5\"x20 5\"x20 5\"x20        Adductor squeeze 5\"x10 5\"x20 5\"x20 5\"x20 5\"x20        Hip abd w/ Tband GTB 5\"x10 GTB 5\"x20 GTB 5\"x20 GTB 5\"x20 GTB 5\"x20        SLR flex and abd nv X10 ea 2x10 ea 2x10 ea 2x10 ea        LAQ nv 5\"x20 5\"x20 5\"x20 5\"x20        Seated march nv 5\"x20 5\"x20 5\"x20 5\"x20        Step ups fwd and lat     nv        Heel raises     nv                                               Ther Ex 12/3 12/9 12/11 12/16 12/19        Recumbent bike nv  6 min 7 min 8 min        Gastroc strap stretch 30\"x3 30\"x3 30\"X3 30\"X3 30\"x3        Hamstring stretch nv 30\"x3 30\"x3 30\"X3 30\"x3                     Ther Activity                                       Gait Training                                       Modalities                                            Eval/Re-Eval POC Expires Auth #/ Referral # Total Visits Start Date Expiration Date Extension Info Visits Limitation   12/3 2/3 HIGHMARK [id number] 12 12/3 5/31                                                                               1 2 3 4 5 6   12/3 F    " 12/19     7 8 9 10 11 12                 13 14 15 16 17 18                 19 20 21 22 23 24                 25 26 27 28 29 30

## 2024-12-31 ENCOUNTER — OFFICE VISIT (OUTPATIENT)
Dept: PHYSICAL THERAPY | Facility: MEDICAL CENTER | Age: 79
End: 2024-12-31
Payer: COMMERCIAL

## 2024-12-31 DIAGNOSIS — M23.304 DERANGEMENT OF MEDIAL MENISCUS OF LEFT KNEE: Primary | ICD-10-CM

## 2024-12-31 PROCEDURE — 97110 THERAPEUTIC EXERCISES: CPT | Performed by: PHYSICAL THERAPIST

## 2024-12-31 PROCEDURE — 97112 NEUROMUSCULAR REEDUCATION: CPT | Performed by: PHYSICAL THERAPIST

## 2024-12-31 NOTE — PROGRESS NOTES
"Daily Note     Today's date: 2024  Patient name: Silvia Tilley  : 1945  MRN: [id number]  Referring provider: Kymberly Mcrae DO  Dx:   Encounter Diagnosis     ICD-10-CM    1. Derangement of medial meniscus of left knee  M23.304                        Subjective: Pt reports some improvement w/ ambulation, but does continue to use brace for all weight bearing.  Able to ascend stairs w/ less difficulty.  Remains painful w/ descending.      Objective: See treatment diary below      Assessment: Tolerated treatment well. Patient demonstrated fatigue post treatment, exhibited good technique with therapeutic exercises, and would benefit from continued PT  Progressed as charted w/ good tolerance.    Plan: Continue per plan of care.      Precautions: none      Manuals                                                                 Neuro Re-Ed 12/3 12/9 12/11 12/16 12/19 12/31       Quad sets 5\"x10 5\"x20 5\"x20 5\"x20 5\"x20 5\"x20       Adductor squeeze 5\"x10 5\"x20 5\"x20 5\"x20 5\"x20 5\"x20       Hip abd w/ Tband GTB 5\"x10 GTB 5\"x20 GTB 5\"x20 GTB 5\"x20 GTB 5\"x20 GTB 5\"x20       SLR flex and abd nv X10 ea 2x10 ea 2x10 ea 2x10 ea x20       LAQ nv 5\"x20 5\"x20 5\"x20 5\"x20 5\"x20       Seated march nv 5\"x20 5\"x20 5\"x20 5\"x20 5\"x20       Step ups fwd and lat     nv 6in fwd x20        Heel raises     nv x20                                              Ther Ex 12/3 12/9 12/11 12/16 12/19 12/31       Recumbent bike nv  6 min 7 min 8 min 10 min       Gastroc strap stretch 30\"x3 30\"x3 30\"X3 30\"X3 30\"x3 30\"x3       Hamstring stretch nv 30\"x3 30\"x3 30\"X3 30\"x3 30\"x3                    Ther Activity                                       Gait Training                                       Modalities                                            Eval/Re-Eval POC Expires Auth #/ Referral # Total Visits Start Date Expiration Date Extension Info Visits Limitation   12/3 2/3 HIGHMARK [id number] 12 12/3 5/31                                         "                                       1 2 3 4 5 6   12/3 F  12/9 12/11 12/16 12/19 12/31    7 8 9 10 11 12                 13 14 15 16 17 18                 19 20 21 22 23 24                 25 26 27 28 29 30

## 2025-01-08 ENCOUNTER — OFFICE VISIT (OUTPATIENT)
Dept: PHYSICAL THERAPY | Facility: MEDICAL CENTER | Age: 80
End: 2025-01-08
Payer: COMMERCIAL

## 2025-01-08 DIAGNOSIS — M23.304 DERANGEMENT OF MEDIAL MENISCUS OF LEFT KNEE: Primary | ICD-10-CM

## 2025-01-08 PROCEDURE — 97112 NEUROMUSCULAR REEDUCATION: CPT | Performed by: PHYSICAL THERAPIST

## 2025-01-08 PROCEDURE — 97110 THERAPEUTIC EXERCISES: CPT | Performed by: PHYSICAL THERAPIST

## 2025-01-08 NOTE — PROGRESS NOTES
"Daily Note     Today's date: 2025  Patient name: Silvia Tilley  : 1945  MRN: [id number]  Referring provider: Kymberly Mcrae DO  Dx:   Encounter Diagnosis     ICD-10-CM    1. Derangement of medial meniscus of left knee  M23.304                        Subjective: Pt presents demonstrating an improvement in ambulation. States she his feeling better.  She has been able to descend stairs at home w/ less difficulty.      Objective: See treatment diary below      Assessment: Tolerated treatment well. Patient demonstrated fatigue post treatment, exhibited good technique with therapeutic exercises, and would benefit from continued PT  Most challenged w/ SLR Abduction and lateral step ups.    Plan: Continue per plan of care.      Precautions: none      Manuals                                                                 Neuro Re-Ed 12/3 12/9 12/11 12/16 12/19 12/31 1/8      Quad sets 5\"x10 5\"x20 5\"x20 5\"x20 5\"x20 5\"x20 5\"x20      Adductor squeeze 5\"x10 5\"x20 5\"x20 5\"x20 5\"x20 5\"x20 5\"x20      Hip abd w/ Tband GTB 5\"x10 GTB 5\"x20 GTB 5\"x20 GTB 5\"x20 GTB 5\"x20 GTB 5\"x20 np      SLR flex and abd nv X10 ea 2x10 ea 2x10 ea 2x10 ea x20 X20 ea      LAQ nv 5\"x20 5\"x20 5\"x20 5\"x20 5\"x20 5\"x20      Seated march nv 5\"x20 5\"x20 5\"x20 5\"x20 5\"x20 5\"x20      Step ups fwd and lat     nv 6in fwd x20  6in x20 ea      Heel raises     nv x20 x20                                             Ther Ex 12/3 12/9 12/11 12/16 12/19 12/31 1/8      Recumbent bike nv  6 min 7 min 8 min 10 min 10 min      Gastroc strap stretch 30\"x3 30\"x3 30\"X3 30\"X3 30\"x3 30\"x3 30\"x3      Hamstring stretch nv 30\"x3 30\"x3 30\"X3 30\"x3 30\"x3 30\"x3                   Ther Activity                                       Gait Training                                       Modalities                                            Eval/Re-Eval POC Expires Auth #/ Referral # Total Visits Start Date Expiration Date Extension Info Visits Limitation   12/3 2/3 HIG[facility]ARK [id number] " 12 12/3 5/31                                                                               1 2 3 4 5 6   12/3 F  12/9 12/11 12/16  12/19 12/31    7 8 9 10 11 12   1/8              13 14 15 16 17 18                 19 20 21 22 23 24                 25 26 27 28 29 30

## 2025-01-15 ENCOUNTER — EVALUATION (OUTPATIENT)
Dept: PHYSICAL THERAPY | Facility: MEDICAL CENTER | Age: 80
End: 2025-01-15
Payer: COMMERCIAL

## 2025-01-15 DIAGNOSIS — M23.304 DERANGEMENT OF MEDIAL MENISCUS OF LEFT KNEE: Primary | ICD-10-CM

## 2025-01-15 PROCEDURE — 97112 NEUROMUSCULAR REEDUCATION: CPT | Performed by: PHYSICAL THERAPIST

## 2025-01-15 PROCEDURE — 97110 THERAPEUTIC EXERCISES: CPT | Performed by: PHYSICAL THERAPIST

## 2025-01-15 NOTE — PROGRESS NOTES
PT Re-Evaluation     Today's date: 1/15/2025  Patient name: Silvia Tilley  : 1945  MRN: [id number]  Referring provider: Kymberly Mcrae DO  Dx:   Encounter Diagnosis     ICD-10-CM    1. Derangement of medial meniscus of left knee  M23.304                      Assessment  Impairments: abnormal gait, abnormal or restricted ROM, abnormal movement, activity intolerance, impaired physical strength, lacks appropriate home exercise program, pain with function, weight-bearing intolerance and unable to perform ADL    Assessment details: Silvia Tilley has attended 8 visits since initiating PT and has demonstrated overall improvement.  Mobility and strength has improved with decreased reports of pain. Silvia Tilley has demonstrated an improvement in function as well.  Currently, she has made steady progress towards her goals, but continue to remain limited with static standing and descending stairs.  She does lack TKE at heel strike; but is able to achieve full knee extension.  At this time, continued physical therapy is recommended in order to address their remaining impairments in effort to further improve function; progressing w/ strengthening.  Understanding of Dx/Px/POC: good     Prognosis: good    Goals  Short Term Goals:   1. Pain decreased 2 ratings in 4 weeks MET  2.  ROM increased 10* in 4 weeks MET  3.  Strength increased 1/2 grade in 4 weeks MET    Long Term Goals:   1.  ADLS/IADLS in related activities improved to maximal level in 8 weeks PARTIALLY MET  2.  Ambulation is improved to maximal level in 8 weeks PARTIALLY MET  3.  Recreational activities are improved to maximal level in 8 weeks. PARTIALLY MET  4.  [place] with HEP in 8 weeks.  MET    Plan  Patient would benefit from: PT eval and skilled physical therapy    Planned therapy interventions: IASTM, stretching, strengthening, patient/caregiver education, neuromuscular re-education, manual therapy, abdominal trunk stabilization, functional ROM  "exercises, flexibility, home exercise program, gait training and therapeutic exercise    Frequency: 2x week  Duration in weeks: 8  Treatment plan discussed with: patient        Subjective Evaluation    History of Present Illness  Mechanism of injury: Pt reports overall improvement.  Pain is now mild and intermittent.  Most noted w/ prolonged static standing and descending stairs.  Continues to report apprehension; therefore continues to wear brace at most times w/ weight bearing.  No recent episodes of buckling or giving out, but she does report feeling weakness.  Patient Goals  Patient goals for therapy: decreased pain, increased motion, increased strength and independence with ADLs/IADLs    Pain  At best pain ratin  At worst pain ratin          Objective     Palpation     Additional Palpation Details        Active Range of Motion   Left Knee   Flexion: 140 degrees   Extension: 0 degrees     Strength/Myotome Testing     Left Hip   Planes of Motion   Flexion: 4  Abduction: 4    Right Hip   Planes of Motion   Flexion: 5  Abduction: 5    Left Knee   Flexion: 4+  Extension: 4+    Right Knee   Flexion: 5  Extension: 5    Ambulation     Comments   Continues to lack TKE at heel strike despite having full extension available.      Flowsheet Rows      Flowsheet Row Most Recent Value   PT/OT G-Codes    Current Score 61   Projected Score 68   FOTO information reviewed Yes   Assessment Type Re-evaluation   G code set Other PT/OT Primary   Other PT Primary Current Status () CJ   Other PT Primary Goal Status () CJ                 Precautions: none      Manuals                                                                 Neuro Re-Ed 1/15            bridges 5\"x20            Adductor squeeze 5\"x20                         SLR flex and abd x20            LAQ 5\"x20            Seated \"X20            Step ups fwd and lat 6in x20            Heel raises x20            TKE w/ band GTB 5\"x20            Step downs " "6in x10                         Ther Ex 1/15            Recumbent bike 10 min            Gastroc strap stretch 30\"x3            Hamstring stretch 30\"x3                         Ther Activity                                       Gait Training                                       Modalities                                          Eval/Re-Eval POC Expires Auth #/ Referral # Total Visits Start Date Expiration Date Extension Info Visits Limitation   12/3 2/3 HIGHMARK [id number] 12 12/3 5/31        1/15 3/15                                                                     1 2 3 4 5 6   12/3 F  12/9 12/11 12/16 12/19 12/31    7 8 9 10 11 12   1/8   1/15 F!           13 14 15 16 17 18                 19 20 21 22 23 24                 25 26 27 28 29 30                                      "

## 2025-01-22 ENCOUNTER — OFFICE VISIT (OUTPATIENT)
Dept: OBGYN CLINIC | Facility: MEDICAL CENTER | Age: 80
End: 2025-01-22
Payer: COMMERCIAL

## 2025-01-22 DIAGNOSIS — M23.304 DERANGEMENT OF MEDIAL MENISCUS OF LEFT KNEE: Primary | ICD-10-CM

## 2025-01-22 PROCEDURE — 99213 OFFICE O/P EST LOW 20 MIN: CPT | Performed by: PHYSICAL MEDICINE & REHABILITATION

## 2025-01-22 NOTE — PROGRESS NOTES
1. Derangement of medial meniscus of left knee  MRI knee left  wo contrast        Orders Placed This Encounter   Procedures    MRI knee left  wo contrast        Impression:  Patient is here in follow up of left posterior calf pain likely secondary to tennis leg date of injury as 11/3/2024.  This has resolved.    Silvia left knee pain is likely secondary to a medial meniscus tear that could be the bucket-handle type.  Continues to have severe pain along the medial joint line with knee extension lag and also a flexion lag.  Treatment has included intra-articular steroid injection, hinged knee brace, pes anserine injection and physical therapy.  At this juncture, we will obtain an MRI for her left knee.  Will see her back for MRI review.     Imaging Studies (I personally reviewed images in PACS and report):  Left knee x-rays most recent to this encounter reviewed.  These images show mild osteoarthritis.  No significant joint effusion.    No follow-ups on file.    Patient is in agreement with the above plan.    HPI:  Silvia Tilley is a 79 y.o. female  who presents in follow up.  Here for   Chief Complaint   Patient presents with    Left Knee - Pain, Follow-up     Pt reports she is feeling about the same as last visit        Since last visit: See above.    Following history reviewed and updated:  Past Medical History:   Diagnosis Date    Hyperlipemia     Hypertension     Multiple benign polyps of large intestine      Past Surgical History:   Procedure Laterality Date    ARTHROSCOPY KNEE Left     INCISIONAL BREAST BIOPSY      SKIN LESION EXCISION      lips benign    TUBAL LIGATION  2/73    VARICOSE VEIN SURGERY      ligation     Social History   Social History     Substance and Sexual Activity   Alcohol Use Not Currently    Comment: social drinker as per [employer]     Social History     Substance and Sexual Activity   Drug Use No     Social History     Tobacco Use   Smoking Status Former    Current packs/day: 0.00     Average packs/day: 1 pack/day for 49.0 years (49.0 ttl pk-yrs)    Types: Cigarettes    Start date: 1961    Quit date: 2010    Years since quitting: 15.0    Passive exposure: Past   Smokeless Tobacco Never     Family History   Problem Relation Age of Onset    Hypertension Mother     Pancreatic cancer Mother     Other Father         MVA with significant injury    Breast cancer Sister     Lung cancer Brother      No Known Allergies     Constitutional:  There were no vitals taken for this visit.   General: NAD.  Eyes: Clear sclerae.  ENT: No inflammation, lesion, or mass of lips.  No tracheal deviation.  Musculoskeletal: As mentioned below.  Integumentary: No visible rashes or skin lesions.  Pulmonary/Chest: Effort normal. No respiratory distress.   Neuro: CN's grossly intact, POST.  Psych: Normal affect and judgement.  Vascular: WWP.    Left Knee Exam     Tenderness   The patient is experiencing tenderness in the medial joint line.    Range of Motion   Extension:  abnormal   Flexion:  abnormal     Tests   Jojo:  Medial - positive     Other   Erythema: absent  Scars: absent  Sensation: normal  Pulse: present  Swelling: mild  Effusion: effusion present             Procedures

## 2025-01-23 ENCOUNTER — OFFICE VISIT (OUTPATIENT)
Dept: PHYSICAL THERAPY | Facility: MEDICAL CENTER | Age: 80
End: 2025-01-23
Payer: COMMERCIAL

## 2025-01-23 DIAGNOSIS — M23.304 DERANGEMENT OF MEDIAL MENISCUS OF LEFT KNEE: Primary | ICD-10-CM

## 2025-01-23 PROCEDURE — 97110 THERAPEUTIC EXERCISES: CPT | Performed by: PHYSICAL THERAPIST

## 2025-01-23 PROCEDURE — 97112 NEUROMUSCULAR REEDUCATION: CPT | Performed by: PHYSICAL THERAPIST

## 2025-01-23 NOTE — PROGRESS NOTES
"Daily Note     Today's date: 2025  Patient name: Silvia Tilley  : 1945  MRN: [id number]  Referring provider: Kymberyl Mcrae DO  Dx:   Encounter Diagnosis     ICD-10-CM    1. Derangement of medial meniscus of left knee  M23.304                      Subjective: Pt had f/u w/ ortho; is now scheduled for MRI on Tuesday due to persistent medial knee pain.  She does report improvement overall, but pain is persisting at times.       Objective: See treatment diary below      Assessment: Tolerated treatment well. Patient demonstrated fatigue post treatment, exhibited good technique with therapeutic exercises, and would benefit from continued PT  Progressing very well w/ strengthening.  Most challenged w/ step downs.     Plan: Continue per plan of care.      Precautions: none      Manuals                                                                 Neuro Re-Ed 1/15 1/23           bridges 5\"x20 5\"x20           Adductor squeeze 5\"x20 5\"x20                        SLR flex and abd x20 x20           LAQ 5\"x20 5\"X20           Seated \"X20 5\"X20           Step ups fwd and lat 6in x20 6in x20 ea           Heel raises x20 x20           TKE w/ band GTB 5\"x20 GTB 5\"x20           Step downs 6in x10 6in  x10                        Ther Ex 1/15 1/23           Recumbent bike 10 min 10 min           Gastroc strap stretch 30\"x3 30\"x3           Hamstring stretch 30\"x3 30\"X3                        Ther Activity                                       Gait Training                                       Modalities                                            Eval/Re-Eval POC Expires Auth #/ Referral # Total Visits Start Date Expiration Date Extension Info Visits Limitation   12/3 2/3 HIGHMARK [id number] 12 12/3 5/31        1/15 3/15                                                                     1 2 3 4 5 6   12/3 F      7 8 9 10 11 12   1/8   1/15 F!           13 14 15 16 17 18               "   19 20 21 22 23 24                 25 26 27 28 29 30

## 2025-01-28 ENCOUNTER — HOSPITAL ENCOUNTER (OUTPATIENT)
Dept: RADIOLOGY | Facility: IMAGING CENTER | Age: 80
Discharge: HOME | End: 2025-01-28
Payer: COMMERCIAL

## 2025-01-28 DIAGNOSIS — M23.304 DERANGEMENT OF MEDIAL MENISCUS OF LEFT KNEE: ICD-10-CM

## 2025-01-28 PROCEDURE — 73721 MRI JNT OF LWR EXTRE W/O DYE: CPT

## 2025-01-30 ENCOUNTER — OFFICE VISIT (OUTPATIENT)
Dept: PHYSICAL THERAPY | Facility: MEDICAL CENTER | Age: 80
End: 2025-01-30
Payer: COMMERCIAL

## 2025-01-30 DIAGNOSIS — M23.304 DERANGEMENT OF MEDIAL MENISCUS OF LEFT KNEE: Primary | ICD-10-CM

## 2025-01-30 PROCEDURE — 97112 NEUROMUSCULAR REEDUCATION: CPT | Performed by: PHYSICAL THERAPIST

## 2025-01-30 PROCEDURE — 97110 THERAPEUTIC EXERCISES: CPT | Performed by: PHYSICAL THERAPIST

## 2025-01-30 NOTE — PROGRESS NOTES
"Daily Note     Today's date: 2025  Patient name: Silvia Tilley  : 1945  MRN: [id number]  Referring provider: Kymberly Mcrae DO  Dx:   Encounter Diagnosis     ICD-10-CM    1. Derangement of medial meniscus of left knee  M23.304                      Subjective: Pt reports yesterday she had a very good day w/ minimal pain.  Today she has medial knee soreness.    Had MRI on       Objective: See treatment diary below      Assessment: Tolerated treatment well. Patient demonstrated fatigue post treatment, exhibited good technique with therapeutic exercises, and would benefit from continued PT  Remains most challenged w/ step downs.      Plan: Continue per plan of care.      Precautions: none      Manuals                                                                 Neuro Re-Ed 1/15 1/23 1/30          bridges 5\"x20 5\"x20 5\"x20          Adductor squeeze 5\"x20 5\"x20 5\"x20          Mini squats   x10          SLR flex and abd x20 x20 x20          LAQ 5\"x20 5\"X20 5\"x20          Seated \"X20 5\"X20 5\"x20          Step ups fwd and lat 6in x20 6in x20 ea 6in x20 ea          Heel raises x20 x20 x20          TKE w/ band GTB 5\"x20 GTB 5\"x20 GTB 5\"x20          Step downs 6in x10 6in  x10 6in x10                       Ther Ex 1/15 1/23 1/30          Recumbent bike 10 min 10 min 10 min          Gastroc strap stretch 30\"x3 30\"x3 30\"x3          Hamstring stretch 30\"x3 30\"X3 30\"x3                       Ther Activity                                       Gait Training                                       Modalities                                            Eval/Re-Eval POC Expires Auth #/ Referral # Total Visits Start Date Expiration Date Extension Info Visits Limitation   12/3 2/3 HIGHMARK [id number] 12 12/3 5/31        1/15 3/15                                                                     1 2 3 4 5 6   12/3 F      7 8 9 10 11 12   1/8   1/15 F!          13 14 15 16 17 18 "                 19 20 21 22 23 24                 25 26 27 28 29 30

## 2025-02-05 ENCOUNTER — OFFICE VISIT (OUTPATIENT)
Dept: PHYSICAL THERAPY | Facility: MEDICAL CENTER | Age: 80
End: 2025-02-05
Payer: COMMERCIAL

## 2025-02-05 ENCOUNTER — OFFICE VISIT (OUTPATIENT)
Dept: OBGYN CLINIC | Facility: MEDICAL CENTER | Age: 80
End: 2025-02-05
Payer: COMMERCIAL

## 2025-02-05 DIAGNOSIS — M23.304 DERANGEMENT OF MEDIAL MENISCUS OF LEFT KNEE: Primary | ICD-10-CM

## 2025-02-05 DIAGNOSIS — M17.12 PRIMARY OSTEOARTHRITIS OF LEFT KNEE: Primary | ICD-10-CM

## 2025-02-05 PROCEDURE — 97112 NEUROMUSCULAR REEDUCATION: CPT | Performed by: PHYSICAL THERAPIST

## 2025-02-05 PROCEDURE — 97110 THERAPEUTIC EXERCISES: CPT | Performed by: PHYSICAL THERAPIST

## 2025-02-05 PROCEDURE — 99213 OFFICE O/P EST LOW 20 MIN: CPT | Performed by: PHYSICAL MEDICINE & REHABILITATION

## 2025-02-05 NOTE — PROGRESS NOTES
"Daily Note     Today's date: 2025  Patient name: Silvia Tilley  : 1945  MRN: [id number]  Referring provider: Kymberly Mcrae DO  Dx:   Encounter Diagnosis     ICD-10-CM    1. Derangement of medial meniscus of left knee  M23.304                      Subjective: Pt had f/u w/ ortho to review MRI which revealed OA, medial meniscus tear and medial tibial plateau insufficiency fracture.  Advised to use SPC to off load.  Will be scheduled for vicso injections.      Objective: See treatment diary below      Assessment: Tolerated treatment well. Patient demonstrated fatigue post treatment and exhibited good technique with therapeutic exercises.  She remains compliant w/ HEP.   Spoke w/ patient about findings and importance of using cane at this time to offload.        Plan:  Will hold PT; schedule injections.  Pt will call for f/u as needed.      Precautions: none      Manuals                                                                 Neuro Re-Ed 1/15 1/23 1/30 2/5         bridges 5\"x20 5\"x20 5\"x20 5\"x20         Adductor squeeze 5\"x20 5\"x20 5\"x20 5\"x20         Mini squats   x10          SLR flex and abd x20 x20 x20 x20         LAQ 5\"x20 5\"X20 5\"x20 5\"x20         Seated \"X20 5\"X20 5\"x20 5\"X20         Step ups fwd and lat 6in x20 6in x20 ea 6in x20 ea np         Heel raises x20 x20 x20 np         TKE w/ band GTB 5\"x20 GTB 5\"x20 GTB 5\"x20 np         Step downs 6in x10 6in  x10 6in x10 np                      Ther Ex 1/15 1/23 1/30 2/5         Recumbent bike 10 min 10 min 10 min 10 min         Gastroc strap stretch 30\"x3 30\"x3 30\"x3 30\"x3         Hamstring stretch 30\"x3 30\"X3 30\"x3 30\"x3                      Ther Activity                                       Gait Training                                       Modalities                                            Eval/Re-Eval POC Expires Auth #/ Referral # Total Visits Start Date Expiration Date Extension Info Visits Limitation   12/3 2/3 HIGHMARK [id number] " 12 12/3 5/31        1/15 3/15                                                                     1 2 3 4 5 6   12/3 F  12/9 12/11 12/16 12/19 12/31    7 8 9 10 11 12   1/8   1/15 F!  1/23 1/30 2/5      13 14 15 16 17 18                 19 20 21 22 23 24                 25 26 27 28 29 30

## 2025-02-05 NOTE — PROGRESS NOTES
"1. Primary osteoarthritis of left knee  Injection procedure prior authorization        Orders Placed This Encounter   Procedures    Injection procedure prior authorization        Impression:  Patient is here in follow up of left posterior calf pain likely secondary to tennis leg date of injury as 11/3/2024.  This has resolved.    Silvia left knee pain is likely secondary to a medial compartment osteoarthritis with a medial meniscal tear/extrusion leading to insufficiency fracture.  Treatment has included protected weightbearing, steroid injection, hinged knee brace, pes anserine injection and physical therapy/home exercise program.  The patient is feeling slightly improved but still has pain along the tibial plateau.  I encouraged her to use a straight cane or walker to offload this leg for the next 2 weeks at least.  I will also obtain viscosupplementation for her.    We will obtain authorization for viscosupplementation.      At this juncture, the patient has failed over 3 months of conservative treatment that has included intraarticular steroid injection, home exercises, activity modification, over the counter oral and topical pain medications.  The pain is interfering with their activities of daily living.  They report their pain as 7/10.    Imaging Studies (I personally reviewed images in PACS and report):  Left knee x-rays most recent to this encounter reviewed.  These images show mild osteoarthritis.  No significant joint effusion.    Left knee MRI:  \"SUBCUTANEOUS TISSUES: Normal     JOINT EFFUSION: None.     BAKER'S CYST: There is a small Baker's cyst.     MENISCI: Tear of the medial meniscus posterior root with medial meniscal extrusion (601/14-16 and 401/18). Additional complex tear of the medial meniscus posterior horn and body with radial and horizontal components (601//18-20 and 401/17-19). Lateral   meniscus is intact.     CRUCIATE LIGAMENTS: Intact.     EXTENSOR APPARATUS: Intact.     COLLATERAL " "LIGAMENTS: Intact. Edema adjacent to the MCL, likely secondary to medial meniscus tear and tibial bone marrow edema  Lateral collateral ligament complex is intact..     ARTICULAR SURFACES:  Medial compartment: Moderate osteoarthritis evidenced by cartilage thinning/irregularity, subchondral bone marrow edema, and marginal osteophytes.  Lateral compartment: Mild osteoarthritis evidenced by mild cartilage thinning/irregularity and small marginal osteophytes.  Patellofemoral compartment: Mild osteoarthritis evidenced by mild cartilage thinning/irregularity and small marginal osteophytes.     BONES: Area of subchondral T1/T2 hypointensity adjacent to the medial tibial plateau bone plate suggestive of subchondral insufficiency fracture (501/17 and 601/17). Marked associated proximal tibia bone marrow edema, greatest in the medial tibial   plateau.     MUSCULATURE: Mild edema within the popliteus muscle, likely reactive.     IMPRESSION:     1. Tear of the medial meniscus posterior root with medial meniscal extrusion and complex tear of the medial meniscus posterior horn and body.     2. Medial tibial plateau subchondral insufficiency fracture with marked associated proximal tibial bone marrow edema.     3. Tricompartmental osteoarthritis, moderate in the medial compartment and mild in the lateral and patellofemoral compartments.     4. Small Baker's cyst.\"    No follow-ups on file.    Patient is in agreement with the above plan.    HPI:  Silvia Tilley is a 79 y.o. female  who presents in follow up.  Here for   Chief Complaint   Patient presents with    Left Knee - Follow-up, Pain     Pt reports pain is about the same, she is still having good and bad days, has been wearing the brace       Since last visit: See above.    Following history reviewed and updated:  Past Medical History:   Diagnosis Date    Hyperlipemia     Hypertension     Multiple benign polyps of large intestine      Past Surgical History:   Procedure " Laterality Date    ARTHROSCOPY KNEE Left     INCISIONAL BREAST BIOPSY      SKIN LESION EXCISION      lips benign    TUBAL LIGATION  2/73    VARICOSE VEIN SURGERY      ligation     Social History   Social History     Substance and Sexual Activity   Alcohol Use Not Currently    Comment: social drinker as per [employer]     Social History     Substance and Sexual Activity   Drug Use No     Social History     Tobacco Use   Smoking Status Former    Current packs/day: 0.00    Average packs/day: 1 pack/day for 49.0 years (49.0 ttl pk-yrs)    Types: Cigarettes    Start date: 1961    Quit date: 2010    Years since quitting: 15.1    Passive exposure: Past   Smokeless Tobacco Never     Family History   Problem Relation Age of Onset    Hypertension Mother     Pancreatic cancer Mother     Other Father         MVA with significant injury    Breast cancer Sister     Lung cancer Brother      No Known Allergies     Constitutional:  There were no vitals taken for this visit.   General: NAD.  Eyes: Clear sclerae.  ENT: No inflammation, lesion, or mass of lips.  No tracheal deviation.  Musculoskeletal: As mentioned below.  Integumentary: No visible rashes or skin lesions.  Pulmonary/Chest: Effort normal. No respiratory distress.   Neuro: CN's grossly intact, POST.  Psych: Normal affect and judgement.  Vascular: WWP.    Left Knee Exam     Tenderness   The patient is experiencing tenderness in the medial joint line.    Range of Motion   Extension:  normal   Flexion:  abnormal     Other   Erythema: absent  Scars: absent  Sensation: normal  Pulse: present  Swelling: mild  Effusion: effusion present             Procedures

## 2025-03-05 ENCOUNTER — PROCEDURE VISIT (OUTPATIENT)
Dept: OBGYN CLINIC | Facility: MEDICAL CENTER | Age: 80
End: 2025-03-05
Payer: COMMERCIAL

## 2025-03-05 DIAGNOSIS — M17.12 PRIMARY OSTEOARTHRITIS OF LEFT KNEE: Primary | ICD-10-CM

## 2025-03-05 PROCEDURE — 20610 DRAIN/INJ JOINT/BURSA W/O US: CPT | Performed by: PHYSICAL MEDICINE & REHABILITATION

## 2025-03-05 RX ORDER — HYALURONATE SODIUM 10 MG/ML
20 SYRINGE (ML) INTRAARTICULAR
Status: COMPLETED | OUTPATIENT
Start: 2025-03-05 | End: 2025-03-05

## 2025-03-05 RX ADMIN — Medication 20 MG: at 10:00

## 2025-03-05 NOTE — PROGRESS NOTES
"1. Primary osteoarthritis of left knee          Orders Placed This Encounter   Procedures    Large joint arthrocentesis        Impression:  Patient is here in follow up of left posterior calf pain likely secondary to tennis leg date of injury as 11/3/2024.  This has resolved.    Silvia left knee pain is likely secondary to a medial compartment osteoarthritis with a medial meniscal tear/extrusion leading to insufficiency fracture.  Treatment has included protected weightbearing, steroid injection, hinged knee brace, pes anserine injection and physical therapy/home exercise program.  The patient is feeling slightly improved but still has pain along the tibial plateau.  I encouraged her to use a straight cane or walker to offload this leg for the next 2 weeks at least. We started HA series.     Imaging Studies (I personally reviewed images in PACS and report):  Left knee x-rays most recent to this encounter reviewed.  These images show mild osteoarthritis.  No significant joint effusion.     Left knee MRI:  \"SUBCUTANEOUS TISSUES: Normal     JOINT EFFUSION: None.     BAKER'S CYST: There is a small Baker's cyst.     MENISCI: Tear of the medial meniscus posterior root with medial meniscal extrusion (601/14-16 and 401/18). Additional complex tear of the medial meniscus posterior horn and body with radial and horizontal components (601//18-20 and 401/17-19). Lateral   meniscus is intact.     CRUCIATE LIGAMENTS: Intact.     EXTENSOR APPARATUS: Intact.     COLLATERAL LIGAMENTS: Intact. Edema adjacent to the MCL, likely secondary to medial meniscus tear and tibial bone marrow edema  Lateral collateral ligament complex is intact..     ARTICULAR SURFACES:  Medial compartment: Moderate osteoarthritis evidenced by cartilage thinning/irregularity, subchondral bone marrow edema, and marginal osteophytes.  Lateral compartment: Mild osteoarthritis evidenced by mild cartilage thinning/irregularity and small marginal " "osteophytes.  Patellofemoral compartment: Mild osteoarthritis evidenced by mild cartilage thinning/irregularity and small marginal osteophytes.     BONES: Area of subchondral T1/T2 hypointensity adjacent to the medial tibial plateau bone plate suggestive of subchondral insufficiency fracture (501/17 and 601/17). Marked associated proximal tibia bone marrow edema, greatest in the medial tibial   plateau.     MUSCULATURE: Mild edema within the popliteus muscle, likely reactive.     IMPRESSION:     1. Tear of the medial meniscus posterior root with medial meniscal extrusion and complex tear of the medial meniscus posterior horn and body.     2. Medial tibial plateau subchondral insufficiency fracture with marked associated proximal tibial bone marrow edema.     3. Tricompartmental osteoarthritis, moderate in the medial compartment and mild in the lateral and patellofemoral compartments.     4. Small Baker's cyst.\"    No follow-ups on file.    Patient is in agreement with the above plan.    HPI:  Silvia Tilley is a 80 y.o. female  who presents in follow up.  Here for   Chief Complaint   Patient presents with    Left Knee - Pain, Injections       Since last visit: See above.    Following history reviewed and updated:  Past Medical History:   Diagnosis Date    Hyperlipemia     Hypertension     Multiple benign polyps of large intestine      Past Surgical History:   Procedure Laterality Date    ARTHROSCOPY KNEE Left     INCISIONAL BREAST BIOPSY      SKIN LESION EXCISION      lips benign    TUBAL LIGATION  2/73    VARICOSE VEIN SURGERY      ligation     Social History   Social History     Substance and Sexual Activity   Alcohol Use Not Currently    Comment: social drinker as per [employer]     Social History     Substance and Sexual Activity   Drug Use No     Social History     Tobacco Use   Smoking Status Former    Current packs/day: 0.00    Average packs/day: 1 pack/day for 49.0 years (49.0 ttl pk-yrs)    Types: Cigarettes "    Start date: 1961    Quit date: 2010    Years since quitting: 15.1    Passive exposure: Past   Smokeless Tobacco Never     Family History   Problem Relation Age of Onset    Hypertension Mother     Pancreatic cancer Mother     Other Father         MVA with significant injury    Breast cancer Sister     Lung cancer Brother      No Known Allergies     Constitutional:  There were no vitals taken for this visit.   General: NAD.  Eyes: Clear sclerae.  ENT: No inflammation, lesion, or mass of lips.  No tracheal deviation.  Musculoskeletal: As mentioned below.  Integumentary: No visible rashes or skin lesions.  Pulmonary/Chest: Effort normal. No respiratory distress.   Neuro: CN's grossly intact, POST.  Psych: Normal affect and judgement.  Vascular: WWP.    Left Knee Exam     Tenderness   The patient is experiencing tenderness in the medial joint line.    Range of Motion   Extension:  normal     Other   Erythema: absent  Scars: absent  Sensation: normal  Pulse: present  Swelling: none  Effusion: no effusion present             Large joint arthrocentesis: L knee  Universal Protocol:  procedure performed by consultantConsent: Verbal consent obtained. Written consent not obtained.  Risks and benefits: risks, benefits and alternatives were discussed  Consent given by: patient  Timeout called at: 3/5/2025 9:46 AM.  Patient understanding: patient states understanding of the procedure being performed  Site marked: the operative site was marked  Radiology Images displayed and confirmed. If images not available, report reviewed: imaging studies available  Patient identity confirmed: verbally with patient  Supporting Documentation  Indications: pain   Procedure Details  Location: knee - L knee  Needle size: 22 G  Ultrasound guidance: no  Approach: Inferolateral to patella.  Medications administered: 20 mg Sodium Hyaluronate (Viscosup) 20 MG/2ML    Patient tolerance: patient tolerated the procedure well with no immediate  complications  Dressing:  Sterile dressing applied    Risks of this procedure include:    - Risk of bleeding since a needle is involved.  - Risk of infection (1/10,000 chance as per recent studies).  Signs/symptoms were discussed and they would prompt an urgent evaluation at an emergency department.  - Risk of synovitis from the viscosupplementation.    The benefits outweigh the risks and so we proceeded with the procedure.

## 2025-03-12 ENCOUNTER — PROCEDURE VISIT (OUTPATIENT)
Dept: OBGYN CLINIC | Facility: MEDICAL CENTER | Age: 80
End: 2025-03-12
Payer: COMMERCIAL

## 2025-03-12 DIAGNOSIS — M17.12 PRIMARY OSTEOARTHRITIS OF LEFT KNEE: Primary | ICD-10-CM

## 2025-03-12 PROCEDURE — 20610 DRAIN/INJ JOINT/BURSA W/O US: CPT | Performed by: PHYSICAL MEDICINE & REHABILITATION

## 2025-03-12 RX ORDER — HYALURONATE SODIUM 10 MG/ML
20 SYRINGE (ML) INTRAARTICULAR
Status: COMPLETED | OUTPATIENT
Start: 2025-03-12 | End: 2025-03-12

## 2025-03-12 RX ADMIN — Medication 20 MG: at 09:15

## 2025-03-12 NOTE — PROGRESS NOTES
"1. Primary osteoarthritis of left knee  Large joint arthrocentesis: L knee        Orders Placed This Encounter   Procedures    Large joint arthrocentesis: L knee        Impression:  Patient is here in follow up of left posterior calf pain likely secondary to tennis leg date of injury as 11/3/2024.  This has resolved.    Silvia left knee pain is likely secondary to a medial compartment osteoarthritis with a medial meniscal tear/extrusion leading to insufficiency fracture.  Treatment has included protected weightbearing, steroid injection, hinged knee brace, pes anserine injection and physical therapy/home exercise program.  The patient is feeling slightly improved but still has pain along the tibial plateau.  I encouraged her to use a straight cane or walker to offload this leg for the next 2 weeks at least.  We continued HA series.     Imaging Studies (I personally reviewed images in PACS and report):  Left knee x-rays most recent to this encounter reviewed.  These images show mild osteoarthritis.  No significant joint effusion.     Left knee MRI:  \"SUBCUTANEOUS TISSUES: Normal     JOINT EFFUSION: None.     BAKER'S CYST: There is a small Baker's cyst.     MENISCI: Tear of the medial meniscus posterior root with medial meniscal extrusion (601/14-16 and 401/18). Additional complex tear of the medial meniscus posterior horn and body with radial and horizontal components (601//18-20 and 401/17-19). Lateral   meniscus is intact.     CRUCIATE LIGAMENTS: Intact.     EXTENSOR APPARATUS: Intact.     COLLATERAL LIGAMENTS: Intact. Edema adjacent to the MCL, likely secondary to medial meniscus tear and tibial bone marrow edema  Lateral collateral ligament complex is intact..     ARTICULAR SURFACES:  Medial compartment: Moderate osteoarthritis evidenced by cartilage thinning/irregularity, subchondral bone marrow edema, and marginal osteophytes.  Lateral compartment: Mild osteoarthritis evidenced by mild cartilage " "thinning/irregularity and small marginal osteophytes.  Patellofemoral compartment: Mild osteoarthritis evidenced by mild cartilage thinning/irregularity and small marginal osteophytes.     BONES: Area of subchondral T1/T2 hypointensity adjacent to the medial tibial plateau bone plate suggestive of subchondral insufficiency fracture (501/17 and 601/17). Marked associated proximal tibia bone marrow edema, greatest in the medial tibial   plateau.     MUSCULATURE: Mild edema within the popliteus muscle, likely reactive.     IMPRESSION:     1. Tear of the medial meniscus posterior root with medial meniscal extrusion and complex tear of the medial meniscus posterior horn and body.     2. Medial tibial plateau subchondral insufficiency fracture with marked associated proximal tibial bone marrow edema.     3. Tricompartmental osteoarthritis, moderate in the medial compartment and mild in the lateral and patellofemoral compartments.     4. Small Baker's cyst.\"       No follow-ups on file.    Patient is in agreement with the above plan.    HPI:  Silvia Tilley is a 80 y.o. female  who presents in follow up.  Here for   Chief Complaint   Patient presents with    Left Knee - Pain, Injections     Euflexxa #2       Since last visit: See above.    Following history reviewed and updated:  Past Medical History:   Diagnosis Date    Hyperlipemia     Hypertension     Multiple benign polyps of large intestine      Past Surgical History:   Procedure Laterality Date    ARTHROSCOPY KNEE Left     INCISIONAL BREAST BIOPSY      SKIN LESION EXCISION      lips benign    TUBAL LIGATION  2/73    VARICOSE VEIN SURGERY      ligation     Social History   Social History     Substance and Sexual Activity   Alcohol Use Not Currently    Comment: social drinker as per [employer]     Social History     Substance and Sexual Activity   Drug Use No     Social History     Tobacco Use   Smoking Status Former    Current packs/day: 0.00    Average packs/day: 1 " pack/day for 49.0 years (49.0 ttl pk-yrs)    Types: Cigarettes    Start date: 1961    Quit date: 2010    Years since quitting: 15.2    Passive exposure: Past   Smokeless Tobacco Never     Family History   Problem Relation Age of Onset    Hypertension Mother     Pancreatic cancer Mother     Other Father         MVA with significant injury    Breast cancer Sister     Lung cancer Brother      No Known Allergies     Constitutional:  There were no vitals taken for this visit.   General: NAD.  Eyes: Clear sclerae.  ENT: No inflammation, lesion, or mass of lips.  No tracheal deviation.  Musculoskeletal: As mentioned below.  Integumentary: No visible rashes or skin lesions.  Pulmonary/Chest: Effort normal. No respiratory distress.   Neuro: CN's grossly intact, POST.  Psych: Normal affect and judgement.  Vascular: WWP.    Left Knee Exam     Tenderness   The patient is experiencing tenderness in the medial joint line.    Other   Erythema: absent  Scars: absent  Sensation: normal  Pulse: present  Swelling: none  Effusion: no effusion present             Large joint arthrocentesis: L knee  Universal Protocol:  procedure performed by consultantConsent: Verbal consent obtained. Written consent not obtained.  Risks and benefits: risks, benefits and alternatives were discussed  Consent given by: patient  Timeout called at: 3/12/2025 9:00 AM.  Patient understanding: patient states understanding of the procedure being performed  Site marked: the operative site was marked  Radiology Images displayed and confirmed. If images not available, report reviewed: imaging studies available  Patient identity confirmed: verbally with patient  Supporting Documentation  Indications: pain   Procedure Details  Location: knee - L knee  Needle size: 22 G  Ultrasound guidance: no  Approach: Inferolateral to patella.  Medications administered: 20 mg Sodium Hyaluronate (Viscosup) 20 MG/2ML    Patient tolerance: patient tolerated the procedure well with no  immediate complications  Dressing:  Sterile dressing applied    Risks of this procedure include:    - Risk of bleeding since a needle is involved.  - Risk of infection (1/10,000 chance as per recent studies).  Signs/symptoms were discussed and they would prompt an urgent evaluation at an emergency department.  - Risk of synovitis from the viscosupplementation.    The benefits outweigh the risks and so we proceeded with the procedure.

## 2025-03-18 ENCOUNTER — VBI (OUTPATIENT)
Dept: ADMINISTRATIVE | Facility: OTHER | Age: 80
End: 2025-03-18

## 2025-03-26 ENCOUNTER — PROCEDURE VISIT (OUTPATIENT)
Dept: OBGYN CLINIC | Facility: MEDICAL CENTER | Age: 80
End: 2025-03-26
Payer: COMMERCIAL

## 2025-03-26 VITALS — WEIGHT: 141 LBS | BODY MASS INDEX: 24.98 KG/M2 | HEIGHT: 63 IN

## 2025-03-26 DIAGNOSIS — M17.12 PRIMARY OSTEOARTHRITIS OF LEFT KNEE: Primary | ICD-10-CM

## 2025-03-26 PROCEDURE — 20610 DRAIN/INJ JOINT/BURSA W/O US: CPT | Performed by: PHYSICAL MEDICINE & REHABILITATION

## 2025-03-26 NOTE — PROGRESS NOTES
"1. Primary osteoarthritis of left knee  Large joint arthrocentesis: L knee        Orders Placed This Encounter   Procedures    Large joint arthrocentesis: L knee        Impression:  Patient is here in follow up of left posterior calf pain likely secondary to tennis leg date of injury as 11/3/2024.  This has resolved.    Silvia left knee pain is likely secondary to a medial compartment osteoarthritis with a medial meniscal tear/extrusion leading to insufficiency fracture.  Treatment has included protected weightbearing, steroid injection, hinged knee brace, pes anserine injection and physical therapy/home exercise program.  The patient is feeling slightly improved but still has pain along the medial tibial plateau.  I encouraged her to use a straight cane or walker to offload this leg for two more weeks. We completed HA series. We will have her restart PT.  She should avoid weight bearing exercises that cause pain in the medial knee.     Imaging Studies (I personally reviewed images in PACS and report):  Left knee x-rays most recent to this encounter reviewed.  These images show mild osteoarthritis.  No significant joint effusion.     Left knee MRI:  \"SUBCUTANEOUS TISSUES: Normal     JOINT EFFUSION: None.     BAKER'S CYST: There is a small Baker's cyst.     MENISCI: Tear of the medial meniscus posterior root with medial meniscal extrusion (601/14-16 and 401/18). Additional complex tear of the medial meniscus posterior horn and body with radial and horizontal components (601//18-20 and 401/17-19). Lateral   meniscus is intact.     CRUCIATE LIGAMENTS: Intact.     EXTENSOR APPARATUS: Intact.     COLLATERAL LIGAMENTS: Intact. Edema adjacent to the MCL, likely secondary to medial meniscus tear and tibial bone marrow edema  Lateral collateral ligament complex is intact..     ARTICULAR SURFACES:  Medial compartment: Moderate osteoarthritis evidenced by cartilage thinning/irregularity, subchondral bone marrow edema, and " "marginal osteophytes.  Lateral compartment: Mild osteoarthritis evidenced by mild cartilage thinning/irregularity and small marginal osteophytes.  Patellofemoral compartment: Mild osteoarthritis evidenced by mild cartilage thinning/irregularity and small marginal osteophytes.     BONES: Area of subchondral T1/T2 hypointensity adjacent to the medial tibial plateau bone plate suggestive of subchondral insufficiency fracture (501/17 and 601/17). Marked associated proximal tibia bone marrow edema, greatest in the medial tibial   plateau.     MUSCULATURE: Mild edema within the popliteus muscle, likely reactive.     IMPRESSION:     1. Tear of the medial meniscus posterior root with medial meniscal extrusion and complex tear of the medial meniscus posterior horn and body.     2. Medial tibial plateau subchondral insufficiency fracture with marked associated proximal tibial bone marrow edema.     3. Tricompartmental osteoarthritis, moderate in the medial compartment and mild in the lateral and patellofemoral compartments.     4. Small Baker's cyst.\"       Return for 3-4 week f/u.    Patient is in agreement with the above plan.    HPI:  Silvia Tilley is a 80 y.o. female  who presents in follow up.  Here for   Chief Complaint   Patient presents with    Left Knee - Pain, Follow-up       Since last visit: See above.    Following history reviewed and updated:  Past Medical History:   Diagnosis Date    Hyperlipemia     Hypertension     Multiple benign polyps of large intestine      Past Surgical History:   Procedure Laterality Date    ARTHROSCOPY KNEE Left     INCISIONAL BREAST BIOPSY      SKIN LESION EXCISION      lips benign    TUBAL LIGATION  2/73    VARICOSE VEIN SURGERY      ligation     Social History   Social History     Substance and Sexual Activity   Alcohol Use Not Currently    Comment: social drinker as per [employer]     Social History     Substance and Sexual Activity   Drug Use No     Social History     Tobacco Use " "  Smoking Status Former    Current packs/day: 0.00    Average packs/day: 1 pack/day for 49.0 years (49.0 ttl pk-yrs)    Types: Cigarettes    Start date: 1961    Quit date: 2010    Years since quitting: 15.2    Passive exposure: Past   Smokeless Tobacco Never     Family History   Problem Relation Age of Onset    Hypertension Mother     Pancreatic cancer Mother     Other Father         MVA with significant injury    Breast cancer Sister     Lung cancer Brother      No Known Allergies     Constitutional:  Ht 5' 2.99\" (1.6 m)   Wt 64 kg (141 lb)   BMI 24.98 kg/m²    General: NAD.  Eyes: Clear sclerae.  ENT: No inflammation, lesion, or mass of lips.  No tracheal deviation.  Musculoskeletal: As mentioned below.  Integumentary: No visible rashes or skin lesions.  Pulmonary/Chest: Effort normal. No respiratory distress.   Neuro: CN's grossly intact, POST.  Psych: Normal affect and judgement.  Vascular: WWP.    Left Knee Exam     Tenderness   The patient is experiencing tenderness in the medial joint line.    Other   Erythema: absent  Scars: absent  Sensation: normal  Pulse: present  Swelling: none  Effusion: no effusion present             Large joint arthrocentesis: L knee  Universal Protocol:  procedure performed by consultantConsent: Verbal consent obtained. Written consent not obtained.  Risks and benefits: risks, benefits and alternatives were discussed  Consent given by: patient  Timeout called at: 3/12/2025 9:00 AM.  Patient understanding: patient states understanding of the procedure being performed  Site marked: the operative site was marked  Radiology Images displayed and confirmed. If images not available, report reviewed: imaging studies available  Patient identity confirmed: verbally with patient  Supporting Documentation  Indications: pain   Procedure Details  Location: knee - L knee  Needle size: 22 G  Ultrasound guidance: no  Approach: Inferolateral to patella.  Medications administered: 20 mg Sodium " Hyaluronate (Viscosup) 20 MG/2ML    Patient tolerance: patient tolerated the procedure well with no immediate complications  Dressing:  Sterile dressing applied    Risks of this procedure include:    - Risk of bleeding since a needle is involved.  - Risk of infection (1/10,000 chance as per recent studies).  Signs/symptoms were discussed and they would prompt an urgent evaluation at an emergency department.  - Risk of synovitis from the viscosupplementation.    The benefits outweigh the risks and so we proceeded with the procedure.

## 2025-04-09 ENCOUNTER — EVALUATION (OUTPATIENT)
Dept: PHYSICAL THERAPY | Facility: MEDICAL CENTER | Age: 80
End: 2025-04-09
Payer: COMMERCIAL

## 2025-04-09 DIAGNOSIS — M17.12 PRIMARY OSTEOARTHRITIS OF LEFT KNEE: ICD-10-CM

## 2025-04-09 PROCEDURE — 97161 PT EVAL LOW COMPLEX 20 MIN: CPT | Performed by: PHYSICAL THERAPIST

## 2025-04-09 PROCEDURE — 97140 MANUAL THERAPY 1/> REGIONS: CPT | Performed by: PHYSICAL THERAPIST

## 2025-04-09 NOTE — PROGRESS NOTES
PT Evaluation     Today's date: 4/10/2025  Patient name: Silvia Tilley  : 1945  MRN: [id number]  Referring provider: Kymberly Mcrae DO  Dx:   Encounter Diagnosis     ICD-10-CM    1. Primary osteoarthritis of left knee  M17.12 Ambulatory referral to Physical Therapy     PT plan of care cert/re-cert          Start Time: 1010  Stop Time: 1050  Total time in clinic (min): 40 minutes    Assessment  Impairments: abnormal muscle tone, abnormal or restricted ROM, abnormal movement, activity intolerance, impaired physical strength, lacks appropriate home exercise program, pain with function, weight-bearing intolerance and unable to perform ADL    Assessment details: Silvia Tilley is a 80 y.o. female who presents with Primary osteoarthritis of left knee.  Patient presents alert and oriented with the above impairments. . Silvia will benefit from PT to addres deficits in order to maximize and return to prior level of function.  No further referral appears necessary at this time based upon examination results.  Prognosis is good given HEP compliance. Please contact me if you have any questions or recommendations.     Understanding of Dx/Px/POC: good     Prognosis: good    Goals  Short Term Goals:   1. Pain decreased 2 ratings in 4 weeks  2.  ROM increased 10* in 4 weeks  3.  Strength increased 1/2 grade in 4 weeks    Long Term Goals:   1.  ADLS/IADLS in related activities improved to maximal level in 8 weeks  2.  Ambulation is improved to maximal level in 8 weeks  3.  Recreational activities are improved to maximal level in 8 weeks.  4.  [place] with HEP in 8 weeks.     Plan  Patient would benefit from: PT eval and skilled physical therapy    Planned therapy interventions: IASTM, manual therapy, neuromuscular re-education, patient/caregiver education, therapeutic exercise, stretching, strengthening, abdominal trunk stabilization, gait training, functional ROM exercises, flexibility, balance and home exercise  "program    Frequency: 2x week  Duration in weeks: 8  Treatment plan discussed with: patient        Subjective Evaluation    History of Present Illness  Mechanism of injury: Pt reports onset of L knee pain in November.  She went to ED and then ortho.  She was referred to PT and attended in Dec w/ minimal improvement at that time.  Imaging then revealed medial meniscus tear as well as insufficiency fracture at medial tibial plateau.  She continued w/ PT through February w/ some improvement.  She was advised to continue using SPC to offload as necessary.  She returned to ortho and recently underwent series of HA injections.  She is unsure of amount of relief.  She c/o persistent pain along medial knee over tibial plateau.   She was then referred back to PT.  She presents today w/ reports of constant burning pain in medial knee.  She reports increased pain in posterior knee for the past 5 days w/ reports of a \"lump\".  Challenged w/ descending stairs.  She is unable to determine exacerbating factors.  She does wear brace and use SPC at all times when ambulating outside the home.  No longer experiencing buckling or giving out.  At times she has some trouble sleeping.  Patient Goals  Patient goals for therapy: decreased pain, increased motion, increased strength and independence with ADLs/IADLs    Pain  At best pain ratin  At worst pain ratin          Objective     Palpation     Additional Palpation Details  Tightness and restrictions present over medial knee, distal ITB, distal hamstring, and prox gastroc   Palpable probable bakers cyst    Active Range of Motion   Left Knee   Flexion: 130 degrees   Extension: 0 degrees     Right Knee   Flexion: 140 degrees   Extension: 0 degrees     Strength/Myotome Testing     Left Hip   Planes of Motion   Flexion: 4-  Extension: 3+  Abduction: 4+    Right Hip   Planes of Motion   Flexion: 5  Extension: 4+  Abduction: 5    Left Knee   Prone flexion: 3+  Extension: 4+    Right " "Knee   Prone flexion: 5  Extension: 5    Ambulation     Comments   Ambulates w/ SPC demonstrating a decrease in knee flexion during swing      Flowsheet Rows      Flowsheet Row Most Recent Value   PT/OT G-Codes    Current Score 52   Projected Score 64   Assessment Type Evaluation   G code set Other PT/OT Primary   Other PT Primary Current Status () CK   Other PT Primary Goal Status () CJ               Precautions: limit WB ex      Manuals 4/9            Graston medial knee, distal ITB, hamstring, prox gastroc ANDREA                                                   Neuro Re-Ed 4/9            SLR flex, abd, ext nv            Adductor squeeze nv            clamshells nv                                                                Ther Ex 4/9            Hamstring stretch 30\"X3            Gastroc strap stretch 30\"x3            ITB stretch nv            Recumbent bike nv                                                                Ther Activity                                       Gait Training                                       Modalities                                          Eval/Re-Eval POC Expires Auth #/ Referral # Total Visits Start Date Expiration Date Extension Info Visits Limitation   4/9 6/9 [facility] REPLACEMENT AUTH [id number] 4/9 10/5                                                             1 2 3 4 5 6   4/9 F        7 8 9 10 11 12           13 14 15 16 17 18           19 20 21 22 23 24           25 26 27 28 29 30                                 "

## 2025-04-14 ENCOUNTER — OFFICE VISIT (OUTPATIENT)
Dept: PHYSICAL THERAPY | Facility: MEDICAL CENTER | Age: 80
End: 2025-04-14
Payer: COMMERCIAL

## 2025-04-14 DIAGNOSIS — E78.5 HYPERLIPIDEMIA, UNSPECIFIED HYPERLIPIDEMIA TYPE: ICD-10-CM

## 2025-04-14 DIAGNOSIS — M17.12 PRIMARY OSTEOARTHRITIS OF LEFT KNEE: Primary | ICD-10-CM

## 2025-04-14 PROCEDURE — 97140 MANUAL THERAPY 1/> REGIONS: CPT | Performed by: PHYSICAL THERAPIST

## 2025-04-14 PROCEDURE — 97110 THERAPEUTIC EXERCISES: CPT | Performed by: PHYSICAL THERAPIST

## 2025-04-14 RX ORDER — LOVASTATIN 40 MG/1
40 TABLET ORAL DAILY
Qty: 90 TABLET | Refills: 1 | Status: SHIPPED | OUTPATIENT
Start: 2025-04-14

## 2025-04-14 NOTE — PROGRESS NOTES
"Daily Note     Today's date: 2025  Patient name: Silvia Tilley  : 1945  MRN: [id number]  Referring provider: Kymberly Mcrae DO  Dx:   Encounter Diagnosis     ICD-10-CM    1. Primary osteoarthritis of left knee  M17.12                      Subjective: Pt c/o significant discomfort w/ swelling which is limiting her mobility.  This morning she states this morning swelling was a little worse.        Objective: See treatment diary below      Assessment: Tolerated treatment well. Patient exhibited good technique with therapeutic exercises and would benefit from continued PT.  Held strength progressions today due to increased swelling and discomfort.   R/s ortho appt to this week from next due to increased pain and pressure in knee.       Plan: Continue per plan of care.      Precautions: limit WB ex      Manuals            Graston medial knee, distal ITB, hamstring, prox gastroc ANDREA 15 min                                                  Neuro Re-Ed            SLR flex, abd, ext nv            Adductor squeeze nv            clamshells nv                                                                Ther Ex            Hamstring stretch 30\"X3 30\"x3           Gastroc strap stretch 30\"x3 30\"x3           ITB stretch nv 30\"x3           Recumbent bike nv                                                                Ther Activity                                       Gait Training                                       Modalities                                         Eval/Re-Eval POC Expires Auth #/ Referral # Total Visits Start Date Expiration Date Extension Info Visits Limitation    [facility] REPLACEMENT AUTH [id number] 4/9 10/5                                                                               1 2 3 4 5 6    F             7 8 9 10 11 12                 13 14 15 16 17 18                 19 20 21 22 23 24                 25 26 27 28 29 30                     "

## 2025-04-16 ENCOUNTER — OFFICE VISIT (OUTPATIENT)
Dept: PHYSICAL THERAPY | Facility: MEDICAL CENTER | Age: 80
End: 2025-04-16
Payer: COMMERCIAL

## 2025-04-16 DIAGNOSIS — M17.12 PRIMARY OSTEOARTHRITIS OF LEFT KNEE: Primary | ICD-10-CM

## 2025-04-16 PROCEDURE — 97110 THERAPEUTIC EXERCISES: CPT | Performed by: PHYSICAL THERAPIST

## 2025-04-16 PROCEDURE — 97140 MANUAL THERAPY 1/> REGIONS: CPT | Performed by: PHYSICAL THERAPIST

## 2025-04-16 NOTE — PROGRESS NOTES
"Daily Note     Today's date: 2025  Patient name: Silvia Tilley  : 1945  MRN: [id number]  Referring provider: Kymberly Mcrae DO  Dx:   Encounter Diagnosis     ICD-10-CM    1. Primary osteoarthritis of left knee  M17.12                      Subjective: Pt reports swelling somewhat down from Monday.   Still feels stiff.      Objective: See treatment diary below      Assessment: Tolerated treatment well. Patient exhibited good technique with therapeutic exercises and would benefit from continued PT.    Initiated strengthening as charted w/ good tolerance.       Plan: Continue per plan of care.      Precautions: limit WB ex      Manuals           Graston medial knee, distal ITB, hamstring, prox gastroc ANDREA 15 min 10 min                                                 Neuro Re-Ed           SLR flex, abd, ext nv  X20 ea          Adductor squeeze nv  5\"X20          clamshells nv                                                                Ther Ex           Hamstring stretch 30\"X3 30\"x3 30\"x3          Gastroc strap stretch 30\"x3 30\"x3 30\"x3          ITB stretch nv 30\"x3 30\"X3          Recumbent bike nv  5 min                                                              Ther Activity                                       Gait Training                                       Modalities                                         Eval/Re-Eval POC Expires Auth #/ Referral # Total Visits Start Date Expiration Date Extension Info Visits Limitation    [facility] REPLACEMENT AUTH [id number]  10                                                                               1 2 3 4 5 6    F           7 8 9 10 11 12                 13 14 15 16 17 18                 19 20 21 22 23 24                 25 26 27 28 29 30                     "

## 2025-04-17 ENCOUNTER — OFFICE VISIT (OUTPATIENT)
Dept: OBGYN CLINIC | Facility: MEDICAL CENTER | Age: 80
End: 2025-04-17
Payer: COMMERCIAL

## 2025-04-17 VITALS — BODY MASS INDEX: 24.98 KG/M2 | HEIGHT: 63 IN | WEIGHT: 141 LBS

## 2025-04-17 DIAGNOSIS — M17.12 PRIMARY OSTEOARTHRITIS OF LEFT KNEE: Primary | ICD-10-CM

## 2025-04-17 PROCEDURE — 20610 DRAIN/INJ JOINT/BURSA W/O US: CPT | Performed by: PHYSICAL MEDICINE & REHABILITATION

## 2025-04-17 PROCEDURE — 99213 OFFICE O/P EST LOW 20 MIN: CPT | Performed by: PHYSICAL MEDICINE & REHABILITATION

## 2025-04-17 RX ORDER — TRIAMCINOLONE ACETONIDE 40 MG/ML
80 INJECTION, SUSPENSION INTRA-ARTICULAR; INTRAMUSCULAR
Status: COMPLETED | OUTPATIENT
Start: 2025-04-17 | End: 2025-04-17

## 2025-04-17 RX ORDER — ROPIVACAINE HYDROCHLORIDE 5 MG/ML
10 INJECTION, SOLUTION EPIDURAL; INFILTRATION; PERINEURAL
Status: COMPLETED | OUTPATIENT
Start: 2025-04-17 | End: 2025-04-17

## 2025-04-17 RX ADMIN — ROPIVACAINE HYDROCHLORIDE 10 ML: 5 INJECTION, SOLUTION EPIDURAL; INFILTRATION; PERINEURAL at 10:15

## 2025-04-17 RX ADMIN — TRIAMCINOLONE ACETONIDE 80 MG: 40 INJECTION, SUSPENSION INTRA-ARTICULAR; INTRAMUSCULAR at 10:15

## 2025-04-17 NOTE — PROGRESS NOTES
"1. Primary osteoarthritis of left knee          Orders Placed This Encounter   Procedures    Large joint arthrocentesis    Large joint arthrocentesis      Impression:  Patient is here in follow up of left posterior calf pain likely secondary to tennis leg date of injury as 11/3/2024.  This has resolved.    Silvia left knee pain is likely secondary to a medial compartment osteoarthritis with a medial meniscal tear/extrusion leading to insufficiency fracture.  Treatment has included protected weightbearing, steroid injection, hinged knee brace, pes anserine injection, HA injections and physical therapy/home exercise program.  Today we proceeded with a joint aspiration followed by steroid injection.  She will continue with physical therapy and we will see her back in about 2 weeks for possible Baker's cyst aspiration.  If continued symptoms, would consider referral to total joint surgeon.     Imaging Studies (I personally reviewed images in PACS and report):  Left knee x-rays most recent to this encounter reviewed.  These images show mild osteoarthritis.  No significant joint effusion.     Left knee MRI:  \"SUBCUTANEOUS TISSUES: Normal     JOINT EFFUSION: None.     BAKER'S CYST: There is a small Baker's cyst.     MENISCI: Tear of the medial meniscus posterior root with medial meniscal extrusion (601/14-16 and 401/18). Additional complex tear of the medial meniscus posterior horn and body with radial and horizontal components (601//18-20 and 401/17-19). Lateral   meniscus is intact.     CRUCIATE LIGAMENTS: Intact.     EXTENSOR APPARATUS: Intact.     COLLATERAL LIGAMENTS: Intact. Edema adjacent to the MCL, likely secondary to medial meniscus tear and tibial bone marrow edema  Lateral collateral ligament complex is intact..     ARTICULAR SURFACES:  Medial compartment: Moderate osteoarthritis evidenced by cartilage thinning/irregularity, subchondral bone marrow edema, and marginal osteophytes.  Lateral compartment: Mild " "osteoarthritis evidenced by mild cartilage thinning/irregularity and small marginal osteophytes.  Patellofemoral compartment: Mild osteoarthritis evidenced by mild cartilage thinning/irregularity and small marginal osteophytes.     BONES: Area of subchondral T1/T2 hypointensity adjacent to the medial tibial plateau bone plate suggestive of subchondral insufficiency fracture (501/17 and 601/17). Marked associated proximal tibia bone marrow edema, greatest in the medial tibial   plateau.     MUSCULATURE: Mild edema within the popliteus muscle, likely reactive.     IMPRESSION:     1. Tear of the medial meniscus posterior root with medial meniscal extrusion and complex tear of the medial meniscus posterior horn and body.     2. Medial tibial plateau subchondral insufficiency fracture with marked associated proximal tibial bone marrow edema.     3. Tricompartmental osteoarthritis, moderate in the medial compartment and mild in the lateral and patellofemoral compartments.     4. Small Baker's cyst.\"       No follow-ups on file.    Patient is in agreement with the above plan.    HPI:  Silvia Tilley is a 80 y.o. female  who presents in follow up.  Here for   Chief Complaint   Patient presents with    Left Knee - Follow-up, Pain, Swelling       Since last visit: See above.    Following history reviewed and updated:  Past Medical History:   Diagnosis Date    Hyperlipemia     Hypertension     Multiple benign polyps of large intestine      Past Surgical History:   Procedure Laterality Date    ARTHROSCOPY KNEE Left     INCISIONAL BREAST BIOPSY      SKIN LESION EXCISION      lips benign    TUBAL LIGATION  2/73    VARICOSE VEIN SURGERY      ligation     Social History   Social History     Substance and Sexual Activity   Alcohol Use Not Currently    Comment: social drinker as per [employer]     Social History     Substance and Sexual Activity   Drug Use No     Social History     Tobacco Use   Smoking Status Former    Current " "packs/day: 0.00    Average packs/day: 1 pack/day for 49.0 years (49.0 ttl pk-yrs)    Types: Cigarettes    Start date: 1961    Quit date: 2010    Years since quitting: 15.3    Passive exposure: Past   Smokeless Tobacco Never     Family History   Problem Relation Age of Onset    Hypertension Mother     Pancreatic cancer Mother     Other Father         MVA with significant injury    Breast cancer Sister     Lung cancer Brother      No Known Allergies     Constitutional:  Ht 5' 2.99\" (1.6 m)   Wt 64 kg (141 lb)   BMI 24.98 kg/m²    General: NAD.  Eyes: Clear sclerae.  ENT: No inflammation, lesion, or mass of lips.  No tracheal deviation.  Musculoskeletal: As mentioned below.  Integumentary: No visible rashes or skin lesions.  Pulmonary/Chest: Effort normal. No respiratory distress.   Neuro: CN's grossly intact, POST.  Psych: Normal affect and judgement.  Vascular: WWP.    Left Knee Exam     Tenderness   The patient is experiencing tenderness in the medial joint line.    Range of Motion   Extension:  normal   Flexion:  abnormal     Other   Erythema: absent  Scars: absent  Sensation: normal  Pulse: present  Swelling: mild  Effusion: effusion present             Large joint arthrocentesis: L supra patellar bursa  Universal Protocol:  Procedure performed by: (Medical assistant)  Consent: Verbal consent obtained. Written consent not obtained.  Risks and benefits: risks, benefits and alternatives were discussed  Consent given by: patient  Time out: Immediately prior to procedure a \"time out\" was called to verify the correct patient, procedure, equipment, support staff and site/side marked as required.  Timeout called at: 4/17/2025 10:20 AM.  Patient understanding: patient states understanding of the procedure being performed  Site marked: the operative site was marked  Radiology Images displayed and confirmed. If images not available, report reviewed: imaging studies available  Patient identity confirmed: verbally with " patient  Supporting Documentation  Indications: joint swelling, diagnostic evaluation and pain   Procedure Details  Location: knee - L supra patellar bursa  Preparation: Patient was prepped and draped in the usual sterile fashion  Needle size: 16 G  Ultrasound guidance: no  Approach: Suprapatellar recess.  Medications administered: 10 mL ropivacaine 0.5 %    Aspirate amount: 26 mL  Aspirate: serous and blood-tinged  Patient tolerance: patient tolerated the procedure well with no immediate complications  Dressing:  Sterile dressing applied    The lateral suprapatellar recess was marked with a surgical marker.  This area was infiltrated with 8 cc's of ropivacaine into the epidermal layers and some into the recess itself.  After the area was anesthestized, a 16 gauge 1.5'' needle was inserted into the recess.  We were then able to aspirate the amount as indicated.  A sterile dressing with compressive wrapping was applied afterwards.    There was little to no resistance encountered during the injection.    Risks of this procedure include:    - Risk of bleeding since a needle is involved.  - Risk of infection (1/10,000 chance as per recent studies).  Signs/symptoms were discussed and they would prompt an urgent evaluation at an emergency department.    The benefits outweigh the risks and so the procedure was completed.      Large joint arthrocentesis: L knee  Universal Protocol:  procedure performed by consultantConsent: Verbal consent obtained. Written consent not obtained.  Risks and benefits: risks, benefits and alternatives were discussed  Consent given by: patient  Timeout called at: 4/17/2025 10:20 AM.  Patient understanding: patient states understanding of the procedure being performed  Patient consent: the patient's understanding of the procedure matches consent given  Site marked: the operative site was marked  Radiology Images displayed and confirmed. If images not available, report reviewed: imaging studies  available  Patient identity confirmed: verbally with patient  Supporting Documentation  Indications: pain   Procedure Details  Location: knee - L knee  Needle size: 22 G  Ultrasound guidance: no  Approach: Inferolateral to patella.  Medications administered: 80 mg triamcinolone acetonide 40 mg/mL    Patient tolerance: patient tolerated the procedure well with no immediate complications  Dressing:  Sterile dressing applied    There was little to no resistance encountered during the injection.    Risks of this procedure include:    - Risk of bleeding since a needle is involved.  - Risk of infection (1/10,000 chance as per recent studies).  Signs/symptoms were discussed and they would prompt an urgent evaluation at an emergency department.  - Risk of pigmentation or skin dimpling in the skin (2-3% chance as per recent studies) from the steroid.  - Risk of increased pain from steroid flare (1% chance as per recent studies) that typically lasts 24-48 hours.  - Risk of increased blood sugars from the steroid medication that can last for a few weeks.  If the patient is a diabetic or pre-diabetic, they were encouraged to closely monitor their blood sugars and discuss with PCP if elevated more than usual or if having symptoms.    The benefits outweigh the risks and so the procedure was completed.

## 2025-04-21 ENCOUNTER — OFFICE VISIT (OUTPATIENT)
Dept: PHYSICAL THERAPY | Facility: MEDICAL CENTER | Age: 80
End: 2025-04-21
Attending: PHYSICAL MEDICINE & REHABILITATION
Payer: COMMERCIAL

## 2025-04-21 DIAGNOSIS — M17.12 PRIMARY OSTEOARTHRITIS OF LEFT KNEE: Primary | ICD-10-CM

## 2025-04-21 PROCEDURE — 97140 MANUAL THERAPY 1/> REGIONS: CPT | Performed by: PHYSICAL THERAPIST

## 2025-04-21 PROCEDURE — 97110 THERAPEUTIC EXERCISES: CPT | Performed by: PHYSICAL THERAPIST

## 2025-04-21 NOTE — PROGRESS NOTES
"Daily Note     Today's date: 2025  Patient name: Silvia Tilley  : 1945  MRN: [id number]  Referring provider: Kymberly Mcrae DO  Dx:   Encounter Diagnosis     ICD-10-CM    1. Primary osteoarthritis of left knee  M17.12                        Subjective: Pt had knee aspiration w/ steroid injection on Thursday.  Felt slightly better; however, stiffness is beginning to increase again.      Objective: See treatment diary below      Assessment: Tolerated treatment well. Patient exhibited good technique with therapeutic exercises and would benefit from continued PT.    No increased pain reported t/o treatment.      Plan: Continue per plan of care.      Precautions: limit WB ex      Manuals          Graston medial knee, distal ITB, hamstring, prox gastroc ANDREA 15 min 10 min 10 min                                                Neuro Re-Ed          SLR flex, abd, ext nv  X20 ea X20 ea         Adductor squeeze nv  5\"X20 5\"x20         clamshells nv                                                                Ther Ex          Hamstring stretch 30\"X3 30\"x3 30\"x3 30\"x3         Gastroc strap stretch 30\"x3 30\"x3 30\"x3 30\"x3         ITB stretch nv 30\"x3 30\"X3 30\"x3         Recumbent bike nv  5 min 10 min                                                             Ther Activity                                       Gait Training                                       Modalities                                         Eval/Re-Eval POC Expires Auth #/ Referral # Total Visits Start Date Expiration Date Extension Info Visits Limitation    [facility] REPLACEMENT AUTH [id number] 4/9 10/5                                                                               1 2 3 4 5 6    F         7 8 9 10 11 12                 13 14 15 16 17 18                 19 20 21 22 23 24                 25 26 27 28 29 30                     "

## 2025-04-23 ENCOUNTER — OFFICE VISIT (OUTPATIENT)
Dept: PHYSICAL THERAPY | Facility: MEDICAL CENTER | Age: 80
End: 2025-04-23
Payer: COMMERCIAL

## 2025-04-23 DIAGNOSIS — M17.12 PRIMARY OSTEOARTHRITIS OF LEFT KNEE: Primary | ICD-10-CM

## 2025-04-23 PROCEDURE — 97140 MANUAL THERAPY 1/> REGIONS: CPT | Performed by: PHYSICAL THERAPIST

## 2025-04-23 PROCEDURE — 97110 THERAPEUTIC EXERCISES: CPT | Performed by: PHYSICAL THERAPIST

## 2025-04-23 NOTE — PROGRESS NOTES
"Daily Note     Today's date: 2025  Patient name: Silvia Tilley  : 1945  MRN: [id number]  Referring provider: Kymberly Mcrae DO  Dx:   Encounter Diagnosis     ICD-10-CM    1. Primary osteoarthritis of left knee  M17.12                          Subjective: Pt reports swelling returned in knee the past few days w/ increased pain and decreased mobility.  She feels she \"is ready\" to consult regarding TKA due to ongoing pain and swelling.  Reports minimal temporary relief w/ PT.  She is compliant w/ HEP.  Has appt scheduled next week again w/ ortho for possible Bakers cyst aspiration.  Aspiration last week did also provide her w/ temporary relief.       Objective: See treatment diary below      Assessment: Tolerated treatment well. Patient exhibited good technique with therapeutic exercises.    No increased pain reported t/o treatment.      Plan:  Hold PT at this time; will continue w/ HEP     Precautions: limit WB ex      Manuals         Graston medial knee, distal ITB, hamstring, prox gastroc ANDREA 15 min 10 min 10 min 10 min                                               Neuro Re-Ed         SLR flex, abd, ext nv  X20 ea X20 ea x20        Adductor squeeze nv  5\"X20 5\"x20 5\"x20        clamshells nv                                                                Ther Ex         Hamstring stretch 30\"X3 30\"x3 30\"x3 30\"x3 30\"X3        Gastroc strap stretch 30\"x3 30\"x3 30\"x3 30\"x3 30\"x3        ITB stretch nv 30\"x3 30\"X3 30\"x3 30\"x3        Recumbent bike nv  5 min 10 min 10 min                                                            Ther Activity                                       Gait Training                                       Modalities                                         Eval/Re-Eval POC Expires Auth #/ Referral # Total Visits Start Date Expiration Date Extension Info Visits Limitation    [facility] REPLACEMENT AUTH [id number] " 4/9 10/5                                                                               1 2 3 4 5 6   4/9 F  4/14  4/16  4/21 4/23     7 8 9 10 11 12                 13 14 15 16 17 18                 19 20 21 22 23 24                 25 26 27 28 29 30

## 2025-05-01 ENCOUNTER — PROCEDURE VISIT (OUTPATIENT)
Dept: OBGYN CLINIC | Facility: MEDICAL CENTER | Age: 80
End: 2025-05-01
Payer: COMMERCIAL

## 2025-05-01 ENCOUNTER — RESULTS FOLLOW-UP (OUTPATIENT)
Dept: FAMILY MEDICINE CLINIC | Facility: CLINIC | Age: 80
End: 2025-05-01

## 2025-05-01 VITALS — HEIGHT: 63 IN | WEIGHT: 141 LBS | BODY MASS INDEX: 24.98 KG/M2

## 2025-05-01 DIAGNOSIS — M17.12 PRIMARY OSTEOARTHRITIS OF LEFT KNEE: Primary | ICD-10-CM

## 2025-05-01 LAB
ALBUMIN SERPL-MCNC: 4.3 G/DL (ref 3.6–5.1)
ALBUMIN/GLOB SERPL: 1.8 (CALC) (ref 1–2.5)
ALP SERPL-CCNC: 71 U/L (ref 37–153)
ALT SERPL-CCNC: 14 U/L (ref 6–29)
AST SERPL-CCNC: 16 U/L (ref 10–35)
BASOPHILS # BLD AUTO: 70 CELLS/UL (ref 0–200)
BASOPHILS NFR BLD AUTO: 1 %
BILIRUB SERPL-MCNC: 0.5 MG/DL (ref 0.2–1.2)
BUN SERPL-MCNC: 21 MG/DL (ref 7–25)
BUN/CREAT SERPL: ABNORMAL (CALC) (ref 6–22)
CALCIUM SERPL-MCNC: 9.8 MG/DL (ref 8.6–10.4)
CHLORIDE SERPL-SCNC: 102 MMOL/L (ref 98–110)
CHOLEST SERPL-MCNC: 177 MG/DL
CHOLEST/HDLC SERPL: 3.3 (CALC)
CO2 SERPL-SCNC: 33 MMOL/L (ref 20–32)
CREAT SERPL-MCNC: 0.82 MG/DL (ref 0.6–1)
EOSINOPHIL # BLD AUTO: 28 CELLS/UL (ref 15–500)
EOSINOPHIL NFR BLD AUTO: 0.4 %
ERYTHROCYTE [DISTWIDTH] IN BLOOD BY AUTOMATED COUNT: 11.8 % (ref 11–15)
GFR/BSA.PRED SERPLBLD CYS-BASED-ARV: 73 ML/MIN/1.73M2
GLOBULIN SER CALC-MCNC: 2.4 G/DL (CALC) (ref 1.9–3.7)
GLUCOSE SERPL-MCNC: 89 MG/DL (ref 65–99)
HCT VFR BLD AUTO: 45.7 % (ref 35–45)
HDLC SERPL-MCNC: 53 MG/DL
HGB BLD-MCNC: 14.6 G/DL (ref 11.7–15.5)
LDLC SERPL CALC-MCNC: 102 MG/DL (CALC)
LYMPHOCYTES # BLD AUTO: 2135 CELLS/UL (ref 850–3900)
LYMPHOCYTES NFR BLD AUTO: 30.5 %
MCH RBC QN AUTO: 29.7 PG (ref 27–33)
MCHC RBC AUTO-ENTMCNC: 31.9 G/DL (ref 32–36)
MCV RBC AUTO: 93.1 FL (ref 80–100)
MONOCYTES # BLD AUTO: 476 CELLS/UL (ref 200–950)
MONOCYTES NFR BLD AUTO: 6.8 %
NEUTROPHILS # BLD AUTO: 4291 CELLS/UL (ref 1500–7800)
NEUTROPHILS NFR BLD AUTO: 61.3 %
NONHDLC SERPL-MCNC: 124 MG/DL (CALC)
PLATELET # BLD AUTO: 372 THOUSAND/UL (ref 140–400)
PMV BLD REES-ECKER: 9.4 FL (ref 7.5–12.5)
POTASSIUM SERPL-SCNC: 3.9 MMOL/L (ref 3.5–5.3)
PROT SERPL-MCNC: 6.7 G/DL (ref 6.1–8.1)
RBC # BLD AUTO: 4.91 MILLION/UL (ref 3.8–5.1)
SODIUM SERPL-SCNC: 140 MMOL/L (ref 135–146)
TRIGL SERPL-MCNC: 128 MG/DL
WBC # BLD AUTO: 7 THOUSAND/UL (ref 3.8–10.8)

## 2025-05-01 PROCEDURE — 99213 OFFICE O/P EST LOW 20 MIN: CPT | Performed by: PHYSICAL MEDICINE & REHABILITATION

## 2025-05-01 NOTE — ASSESSMENT & PLAN NOTE
Patient is here in follow up of left posterior calf pain likely secondary to tennis leg date of injury as 11/3/2024.  This has resolved.    Silvia continues to have left knee pain that is likely secondary to a medial compartment osteoarthritis with a medial meniscal tear/extrusion leading to insufficiency fracture.  Treatment has included protected weightbearing, steroid injection, hinged knee brace, pes anserine injection, HA injections and physical therapy/home exercise program.  We recently aspirated her joint and followed by steroid injection.  Her symptoms have improved since then.  She likely has a Baker's cyst but this is asymptomatic.  However, she still has trouble with certain ADL's.  She is scheduled for consultation with Dr. Rendon.  If effusion worsened, she can return for repeat aspiration.

## 2025-05-01 NOTE — PROGRESS NOTES
Assessment & Plan  Primary osteoarthritis of left knee  Patient is here in follow up of left posterior calf pain likely secondary to tennis leg date of injury as 11/3/2024.  This has resolved.    Silvia continues to have left knee pain that is likely secondary to a medial compartment osteoarthritis with a medial meniscal tear/extrusion leading to insufficiency fracture.  Treatment has included protected weightbearing, steroid injection, hinged knee brace, pes anserine injection, HA injections and physical therapy/home exercise program.  We recently aspirated her joint and followed by steroid injection.  Her symptoms have improved since then.  She likely has a Baker's cyst but this is asymptomatic.  However, she still has trouble with certain ADL's.  She is scheduled for consultation with Dr. Rendon.  If effusion worsened, she can return for repeat aspiration.        No follow-ups on file.    Patient is in agreement with the above plan.    HPI:  Silvia Tilley is a 80 y.o. female  who presents in follow up.  Here for   Chief Complaint   Patient presents with    Left Knee - Follow-up, Pain, Swelling       Since last visit: See above.    Following history reviewed and updated:  Past Medical History:   Diagnosis Date    Hyperlipemia     Hypertension     Multiple benign polyps of large intestine      Past Surgical History:   Procedure Laterality Date    ARTHROSCOPY KNEE Left     INCISIONAL BREAST BIOPSY      SKIN LESION EXCISION      lips benign    TUBAL LIGATION  2/73    VARICOSE VEIN SURGERY      ligation     Social History   Social History     Substance and Sexual Activity   Alcohol Use Not Currently    Comment: social drinker as per [employer]     Social History     Substance and Sexual Activity   Drug Use No     Social History     Tobacco Use   Smoking Status Former    Current packs/day: 0.00    Average packs/day: 1 pack/day for 49.0 years (49.0 ttl pk-yrs)    Types: Cigarettes    Start date: 1961    Quit date: 2010  "   Years since quitting: 15.3    Passive exposure: Past   Smokeless Tobacco Never     Family History   Problem Relation Age of Onset    Hypertension Mother     Pancreatic cancer Mother     Other Father         MVA with significant injury    Breast cancer Sister     Lung cancer Brother      No Known Allergies     Constitutional:  Ht 5' 2.99\" (1.6 m)   Wt 64 kg (141 lb)   BMI 24.98 kg/m²    General: NAD.  Eyes: Clear sclerae.  ENT: No inflammation, lesion, or mass of lips.  No tracheal deviation.  Musculoskeletal: As mentioned below.  Integumentary: No visible rashes or skin lesions.  Pulmonary/Chest: Effort normal. No respiratory distress.   Neuro: CN's grossly intact, POST.  Psych: Normal affect and judgement.  Vascular: WWP.    Left Knee Exam     Tenderness   The patient is experiencing tenderness in the medial joint line.    Range of Motion   Extension:  normal     Other   Erythema: absent  Scars: absent  Sensation: normal  Pulse: present  Swelling: mild  Effusion: effusion present             Procedures  "

## 2025-05-07 ENCOUNTER — OFFICE VISIT (OUTPATIENT)
Dept: PHYSICAL THERAPY | Facility: MEDICAL CENTER | Age: 80
End: 2025-05-07
Attending: PHYSICAL MEDICINE & REHABILITATION
Payer: COMMERCIAL

## 2025-05-07 DIAGNOSIS — M17.12 PRIMARY OSTEOARTHRITIS OF LEFT KNEE: Primary | ICD-10-CM

## 2025-05-07 PROCEDURE — 97110 THERAPEUTIC EXERCISES: CPT | Performed by: PHYSICAL THERAPIST

## 2025-05-07 PROCEDURE — 97140 MANUAL THERAPY 1/> REGIONS: CPT | Performed by: PHYSICAL THERAPIST

## 2025-05-07 NOTE — PROGRESS NOTES
"Daily Note     Today's date: 2025  Patient name: Silvia Tilley  : 1945  MRN: [id number]  Referring provider: Kymberly Mcrae DO  Dx:   Encounter Diagnosis     ICD-10-CM    1. Primary osteoarthritis of left knee  M17.12                          Subjective: Pt presents back to PT this week.  She reports increased swelling and discomfort the past few days.   Most discomfort in medial knee.  Has ortho consult next week for TKA.  If decided she is a candidate for TKA she would like to wait a few months due to personal reasons/family concerns.      Objective: See treatment diary below      Assessment: Tolerated treatment well. Patient exhibited good technique with therapeutic exercises.    Restrictions persist at medial knee.   Able to complete ex program as charted.       Plan:  Hold PT at this time; will continue w/ HEP     Precautions: limit WB ex      Manuals        Graston medial knee, distal ITB, hamstring, prox gastroc ANDREA 15 min 10 min 10 min 10 min 10 min                                              Neuro Re-Ed  5       SLR flex, abd, ext nv  X20 ea X20 ea x20 x20       Adductor squeeze nv  5\"X20 5\"x20 5\"x20 5\"x20       clamshells nv     5\"x20                                                           Ther Ex  5/7       Hamstring stretch 30\"X3 30\"x3 30\"x3 30\"x3 30\"X3 30\"X3       Gastroc strap stretch 30\"x3 30\"x3 30\"x3 30\"x3 30\"x3 30\"x3       ITB stretch nv 30\"x3 30\"X3 30\"x3 30\"x3 30\"x3       Recumbent bike nv  5 min 10 min 10 min 10 min                                                           Ther Activity                                       Gait Training                                       Modalities                                         Eval/Re-Eval POC Expires Auth #/ Referral # Total Visits Start Date Expiration Date Extension Info Visits Limitation    [facility] REPLAC[facility] AUTH [id number] 4/9 10/5                 "                                                               1 2 3 4 5 6   4/9 F  4/14  4/16  4/21 4/23 5/7    7 8 9 10 11 12                 13 14 15 16 17 18                 19 20 21 22 23 24                 25 26 27 28 29 30

## 2025-05-14 ENCOUNTER — OFFICE VISIT (OUTPATIENT)
Dept: OBGYN CLINIC | Facility: CLINIC | Age: 80
End: 2025-05-14
Payer: COMMERCIAL

## 2025-05-14 ENCOUNTER — RA CDI HCC (OUTPATIENT)
Dept: OTHER | Facility: HOSPITAL | Age: 80
End: 2025-05-14

## 2025-05-14 ENCOUNTER — HOSPITAL ENCOUNTER (OUTPATIENT)
Dept: RADIOLOGY | Facility: HOSPITAL | Age: 80
Discharge: HOME | End: 2025-05-14
Attending: ORTHOPAEDIC SURGERY
Payer: COMMERCIAL

## 2025-05-14 VITALS — BODY MASS INDEX: 23.04 KG/M2 | HEIGHT: 63 IN | WEIGHT: 130 LBS

## 2025-05-14 DIAGNOSIS — M17.12 PRIMARY OSTEOARTHRITIS OF LEFT KNEE: ICD-10-CM

## 2025-05-14 DIAGNOSIS — M17.12 PRIMARY OSTEOARTHRITIS OF LEFT KNEE: Primary | ICD-10-CM

## 2025-05-14 DIAGNOSIS — Z01.818 PREOPERATIVE TESTING: ICD-10-CM

## 2025-05-14 PROCEDURE — 99215 OFFICE O/P EST HI 40 MIN: CPT | Performed by: ORTHOPAEDIC SURGERY

## 2025-05-14 PROCEDURE — 73562 X-RAY EXAM OF KNEE 3: CPT

## 2025-05-14 RX ORDER — FOLIC ACID 1 MG/1
1 TABLET ORAL DAILY
Qty: 30 TABLET | Refills: 1 | Status: SHIPPED | OUTPATIENT
Start: 2025-05-14

## 2025-05-14 RX ORDER — CHLORHEXIDINE GLUCONATE 40 MG/ML
SOLUTION TOPICAL DAILY PRN
OUTPATIENT
Start: 2025-05-14

## 2025-05-14 RX ORDER — FERROUS SULFATE 324(65)MG
324 TABLET, DELAYED RELEASE (ENTERIC COATED) ORAL
Qty: 60 TABLET | Refills: 1 | Status: SHIPPED | OUTPATIENT
Start: 2025-05-14

## 2025-05-14 RX ORDER — SODIUM CHLORIDE, SODIUM LACTATE, POTASSIUM CHLORIDE, CALCIUM CHLORIDE 600; 310; 30; 20 MG/100ML; MG/100ML; MG/100ML; MG/100ML
20 INJECTION, SOLUTION INTRAVENOUS CONTINUOUS
OUTPATIENT
Start: 2025-05-14

## 2025-05-14 RX ORDER — SODIUM CHLORIDE, SODIUM LACTATE, POTASSIUM CHLORIDE, CALCIUM CHLORIDE 600; 310; 30; 20 MG/100ML; MG/100ML; MG/100ML; MG/100ML
125 INJECTION, SOLUTION INTRAVENOUS CONTINUOUS
OUTPATIENT
Start: 2025-05-14

## 2025-05-14 RX ORDER — ACETAMINOPHEN 500 MG
500 TABLET ORAL EVERY 6 HOURS PRN
COMMUNITY

## 2025-05-14 RX ORDER — ASCORBIC ACID 500 MG
500 TABLET ORAL 2 TIMES DAILY
Qty: 60 TABLET | Refills: 1 | Status: SHIPPED | OUTPATIENT
Start: 2025-05-14

## 2025-05-14 RX ORDER — CHLORHEXIDINE GLUCONATE ORAL RINSE 1.2 MG/ML
15 SOLUTION DENTAL ONCE
OUTPATIENT
Start: 2025-05-14 | End: 2025-05-14

## 2025-05-14 RX ORDER — ACETAMINOPHEN 325 MG/1
975 TABLET ORAL ONCE
OUTPATIENT
Start: 2025-05-14 | End: 2025-05-14

## 2025-05-14 NOTE — PATIENT INSTRUCTIONS
"        Mindful preparation to begin daily prior surgery  1. I know and accept that this procedure will bring greater quality of life to me.  2. I must remember the incredible power I possess within me to achieve anything I desire, including being \"pain free\".  3. I have found out what is keeping me from happiness and will remove it. That is why I am here today.  4. I will always come out of a hard situation stronger.   5. I can't wait to embrace the new me when I wake up later.  6. I am determined to remain calm through this  7. Anxiety has no home in my being.  8. I rise above my physical pain.  9. There are some things I can't change, and I'm ok with that. But this procedure isn't one of those things because my team and I have got this in the bag.  10. I am feeling strong and healthy today.  11. Even in this bed, I am making better decisions about what I eat and drink.  12. Because I am here, I am choosing to make my mental and physical health a priority.  13. I picture my body glowing, growing, and healing.  14. I am well, fit, healthy, and powerful.           "

## 2025-05-14 NOTE — PROGRESS NOTES
Name: Silvia Tilley      : 1945      MRN: [id number]  Encounter Provider: Yina Rendon MD  Encounter Date: 2025   Encounter department: [facility]  :  Assessment & Plan  Primary osteoarthritis of left knee  Patient with chronic left knee pain due to osteoarthritis  X-ray left knee was obtained and reviewed in the office today which demonstrates tricompartmental joint space narrowing worse in the medial compartment bone on bone with subchondral sclerosis and osteophyte formation   Discussed with patient conservative treatments: steroid injections, physical therapy, medications, bracing, visco supplementation injections, topical creams, ice or heat and surgical option a total knee replacement   Patient has tried steroid injection, visco injections, bracing, therapy and medications   Discussed with patient surgery for left total knee arthroplasty with robotic assistance. Patient agrees and would like to proceed forward scheduling for surgery   The benefits and purpose of the operations and/or procedure have been explained to me in language I understand by Dr. Rendon well as the risks and benefits, alternatives and complications pertaining to the above procedure/surgery which include but is not limited to: infections, deeps vein thrombosis, blood clots to the lungs, wound healing problems, complications from extensive blood loss, including shock, possible death, continued pain, fracture, vascular or nerve injury, potential need for further surgery/revision, persistent pain/stiffness/instability, failure of hardware,heart attack, stroke and not obtaining results patient used.    SAME DAY SURGERY  She will need medical clearance from her PCP prior to surgery   She will be on vitamins 30 days before surgery   Patient has no history of DVT/PE and will be on Aspirin 81 mg twice daily for DVT prophylaxis postop when discharged from the hospital for 5 weeks   Case  management: She lives at home with her daughter who will be helping her after surgery  Patient will be assessed by physical therapy prior to discharge and will continue outpatient physical therapy  Patient's extremity will be wrapped in an Ace wrap/sleeve from the toes up to the knee down with postoperative swelling.  Patient will then be assessed 2-3 weeks postoperatively with incision check, new x-rays by either Celestino Kellogg PA-C or Dr. Rendon.  At that time it is reasonable for the patient to be on an ambulatory device such as a walker or cane, but at the 3-month cali these ambulatory devices should be discontinued.   Orders:    XR knee 3 vw left non injury; Future    Preoperative testing             Follow up visit :  PO 2 weeks Left total knee arthroplasty         The above stated was discussed in layman's terms and the patient expressed understanding.  All questions were answered to the patient's satisfaction.       Subjective:   Silvia Tilley is a 80 y.o. female who presents today consultation for her left knee. She was referred by Dr. Mcrae.  Patient has been treatment Dr. Mcrae for left knee osteoarthritis.  She has been treated with steroid injections and Euflexxa 3 series injections.  Patient's last steroid injection was on 4/17/2025. She is having pain over the medial aspect of the left knee. She does not swelling in the posterior aspect of the left knee.She did go to therapy and has been continuing with home exercises. She notes her pain is worse with weightbearing and increase activities. She has been wearing over the counter knee brace for comfort. She is using a cane for assistance when ambulating.  She denies numbness or tingling. Her goal is to have no pain.       Review of systems negative unless otherwise specified in HPI    Past Medical History:   Diagnosis Date    Hyperlipemia     Hypertension     Multiple benign polyps of large intestine        Past Surgical History:   Procedure  Laterality Date    ARTHROSCOPY KNEE Left     INCISIONAL BREAST BIOPSY      SKIN LESION EXCISION      lips benign    TUBAL LIGATION  2/73    VARICOSE VEIN SURGERY      ligation       Family History   Problem Relation Age of Onset    Hypertension Mother     Pancreatic cancer Mother     Other Father         MVA with significant injury    Breast cancer Sister     Lung cancer Brother        Social History     Occupational History    Not on file   Tobacco Use    Smoking status: Former     Current packs/day: 0.00     Average packs/day: 1 pack/day for 49.0 years (49.0 ttl pk-yrs)     Types: Cigarettes     Start date: 1961     Quit date: 2010     Years since quitting: 15.3     Passive exposure: Past    Smokeless tobacco: Never   Vaping Use    Vaping status: Never Used   Substance and Sexual Activity    Alcohol use: Not Currently     Comment: social drinker as per [employer]    Drug use: No    Sexual activity: Not Currently       Current Medications[1]    Allergies[2]       There were no vitals filed for this visit.  Body mass index is 23.04 kg/m².  Wt Readings from Last 3 Encounters:   05/14/25 59 kg (130 lb)   05/01/25 64 kg (141 lb)   04/17/25 64 kg (141 lb)       Objective:            Physical Exam  Physical Exam:      General Appearance:    Alert, cooperative, no distress, appears stated age   Head:    Normocephalic, without obvious abnormality, atraumatic   Eyes:    conjunctiva/corneas clear, both eyes         Nose:   Nares normal, septum midline, no drainage    Throat:   Lips normal; teeth and gums normal   Neck:    symmetrical, trachea midline, ;     thyroid:  no enlargement/   Back:     Symmetric, no curvature, ROM normal   Lungs:   No audible wheezing or labored breathing   Chest Wall:    No tenderness or deformity    Heart:    Regular rate and rhythm                         Pulses:   2+ and symmetric all extremities   Skin:   Skin color, texture, turgor normal, no rashes or lesions   Neurologic:   normal strength,  "sensation and reflexes     throughout                       Ortho Exam  Left  Knee  Skin intact  No erythema or open wounds  Mild effusion  Varus alignment but correctable with valgus stress   Flexion Contracture  10   Palpable baker's cyst   Tenderness palpation of medial joint line  No lateral joint line tenderness  Full flexion  Patellar grind test +   Stable to varus and valgus stress  Negative posterior drawer  Negative Lachman test  Neurovascular intact distally       Diagnostics, reviewed and taken today if performed as documented:          The attending physician has personally reviewed the pertinent films in PACS and interpretation is as follows: X-ray left knee demonstrates tricompartmental joint space narrowing worse in the medial compartment bone on bone with subchondral sclerosis and osteophyte formation          Procedures, if performed today:    Procedures    None performed      Scribe Attestation      I,:  Ajith Chavis MA am acting as a scribe while in the presence of the attending physician.:       I,:  Yina Rendon MD personally performed the services described in this documentation    as scribed in my presence.:               Portions of the record may have been created with voice recognition software.  Occasional wrong word or \"sound a like\" substitutions may have occurred due to the inherent limitations of voice recognition software.  Read the chart carefully and recognize, using context, where substitutions have occurred.         [1]   Current Outpatient Medications:     acetaminophen (TYLENOL) 500 mg tablet, Take 500 mg by mouth every 6 (six) hours as needed for mild pain, Disp: , Rfl:     albuterol (ProAir HFA) 90 mcg/act inhaler, Inhale 2 puffs every 4 (four) hours as needed for shortness of breath, Disp: 24 g, Rfl: 1    Calcium Carb-Cholecalciferol (CALCIUM 1000 + D PO), Take by mouth, Disp: , Rfl:     coenzyme Q-10 100 MG capsule, Take by mouth, Disp: , Rfl:     lovastatin " (MEVACOR) 40 MG tablet, TAKE 1 TABLET DAILY, Disp: 90 tablet, Rfl: 1    Multiple Vitamins-Minerals (MULTI FOR HER) TABS, Take by mouth, Disp: , Rfl:     Omega-3 Fatty Acids (FISH OIL) 645 MG CAPS, Take by mouth, Disp: , Rfl:     tiotropium (SPIRIVA) 18 mcg inhalation capsule, Place 1 capsule (18 mcg total) into inhaler and inhale daily, Disp: 100 capsule, Rfl: 1    triamterene-hydrochlorothiazide (MAXZIDE-25) 37.5-25 mg per tablet, Take 1 tablet by mouth daily, Disp: 90 tablet, Rfl: 1    HYDROcodone-acetaminophen (NORCO) 5-325 mg per tablet, Take 1 tablet by mouth every 6 (six) hours as needed for pain for up to 15 doses Max Daily Amount: 4 tablets (Patient not taking: Reported on 5/14/2025), Disp: 15 tablet, Rfl: 0    meclizine (ANTIVERT) 25 mg tablet, Take 1 tablet (25 mg total) by mouth 3 (three) times a day as needed for dizziness (Patient not taking: Reported on 5/1/2024), Disp: 12 tablet, Rfl: 0    triamcinolone (KENALOG) 0.1 % oral topical paste, Apply to the mouth or throat As needed  (Patient not taking: Reported on 6/1/2022), Disp: , Rfl:   [2] No Known Allergies

## 2025-05-14 NOTE — ASSESSMENT & PLAN NOTE
Patient with chronic left knee pain due to osteoarthritis  X-ray left knee was obtained and reviewed in the office today which demonstrates tricompartmental joint space narrowing worse in the medial compartment bone on bone with subchondral sclerosis and osteophyte formation   Discussed with patient conservative treatments: steroid injections, physical therapy, medications, bracing, visco supplementation injections, topical creams, ice or heat and surgical option a total knee replacement   Patient has tried steroid injection, visco injections, bracing, therapy and medications   Discussed with patient surgery for left total knee arthroplasty with robotic assistance. Patient agrees and would like to proceed forward scheduling for surgery   The benefits and purpose of the operations and/or procedure have been explained to me in language I understand by Dr. Rendon well as the risks and benefits, alternatives and complications pertaining to the above procedure/surgery which include but is not limited to: infections, deeps vein thrombosis, blood clots to the lungs, wound healing problems, complications from extensive blood loss, including shock, possible death, continued pain, fracture, vascular or nerve injury, potential need for further surgery/revision, persistent pain/stiffness/instability, failure of hardware,heart attack, stroke and not obtaining results patient used.    SAME DAY SURGERY  She will need medical clearance from her PCP prior to surgery   She will be on vitamins 30 days before surgery   Patient has no history of DVT/PE and will be on Aspirin 81 mg twice daily for DVT prophylaxis postop when discharged from the hospital for 5 weeks   Case management: She lives at home with her daughter who will be helping her after surgery  Patient will be assessed by physical therapy prior to discharge and will continue outpatient physical therapy  Patient's extremity will be wrapped in an Ace wrap/sleeve from the  toes up to the knee down with postoperative swelling.  Patient will then be assessed 2-3 weeks postoperatively with incision check, new x-rays by either Celestino Kellogg PA-C or Dr. Rendon.  At that time it is reasonable for the patient to be on an ambulatory device such as a walker or cane, but at the 3-month cali these ambulatory devices should be discontinued.   Orders:    XR knee 3 vw left non injury; Future

## 2025-05-15 ENCOUNTER — OFFICE VISIT (OUTPATIENT)
Dept: PHYSICAL THERAPY | Facility: MEDICAL CENTER | Age: 80
End: 2025-05-15
Attending: PHYSICAL MEDICINE & REHABILITATION
Payer: COMMERCIAL

## 2025-05-15 DIAGNOSIS — M17.12 PRIMARY OSTEOARTHRITIS OF LEFT KNEE: Primary | ICD-10-CM

## 2025-05-15 PROCEDURE — 97140 MANUAL THERAPY 1/> REGIONS: CPT | Performed by: PHYSICAL THERAPIST

## 2025-05-15 PROCEDURE — 97110 THERAPEUTIC EXERCISES: CPT | Performed by: PHYSICAL THERAPIST

## 2025-05-15 RX ORDER — MUPIROCIN 20 MG/G
OINTMENT TOPICAL
Qty: 22 G | Refills: 0 | Status: SHIPPED | OUTPATIENT
Start: 2025-05-15

## 2025-05-15 NOTE — PROGRESS NOTES
"Daily Note     Today's date: 5/15/2025  Patient name: Silvia Tilley  : 1945  MRN: [id number]  Referring provider: Kymberly Mcrae DO  Dx:   Encounter Diagnosis     ICD-10-CM    1. Primary osteoarthritis of left knee  M17.12                          Subjective: Pt had appt w/ surgeon and is now scheduled for TKA in September.      Objective: See treatment diary below      Assessment: Tolerated treatment well. Patient exhibited good technique with therapeutic exercises.    Spoke w/ patient about importance of continuing w/ HEP leading up to surgery to maintain ROM and strength.  She does demonstrate understanding of HEP.      Plan: Hold PT at this time; will continue w/ HEP     Precautions: limit WB ex      Manuals 4/9 4/14 4/16 4/21 4/23 5/7 5/15      Graston medial knee, distal ITB, hamstring, prox gastroc ANDREA 15 min 10 min 10 min 10 min 10 min 10 min                                             Neuro Re-Ed 4/9 4/14 4/16 4/21 4/23 5/7 5/15      SLR flex, abd, ext nv  X20 ea X20 ea x20 x20 x20      Adductor squeeze nv  5\"X20 5\"x20 5\"x20 5\"x20 5\"x20      clamshells nv     5\"x20 5\"x20                                                          Ther Ex 4/9 4/14 4/16 4/21 4/23 5/7 5/15      Hamstring stretch 30\"X3 30\"x3 30\"x3 30\"x3 30\"X3 30\"X3 30\"x3      Gastroc strap stretch 30\"x3 30\"x3 30\"x3 30\"x3 30\"x3 30\"x3 30\"x3      ITB stretch nv 30\"x3 30\"X3 30\"x3 30\"x3 30\"x3 30\"x3      Recumbent bike nv  5 min 10 min 10 min 10 min 10 min                                                          Ther Activity                                       Gait Training                                       Modalities                                         Eval/Re-Eval POC Expires Auth #/ Referral # Total Visits Start Date Expiration Date Extension Info Visits Limitation    [facility] REPLACEMENT AUTH [id number] 4/9 10/5                                                                               1 2 3 4 5 6    F    " 4/21  4/23 5/7    7 8 9 10 11 12    5/15             13 14 15 16 17 18                 19 20 21 22 23 24                 25 26 27 28 29 30

## 2025-05-20 ENCOUNTER — OFFICE VISIT (OUTPATIENT)
Dept: FAMILY MEDICINE CLINIC | Facility: CLINIC | Age: 80
End: 2025-05-20
Payer: COMMERCIAL

## 2025-05-20 ENCOUNTER — TELEPHONE (OUTPATIENT)
Dept: ANESTHESIOLOGY | Facility: CLINIC | Age: 80
End: 2025-05-20

## 2025-05-20 VITALS
RESPIRATION RATE: 16 BRPM | TEMPERATURE: 98 F | BODY MASS INDEX: 22.86 KG/M2 | SYSTOLIC BLOOD PRESSURE: 146 MMHG | HEART RATE: 84 BPM | DIASTOLIC BLOOD PRESSURE: 78 MMHG | WEIGHT: 129 LBS | OXYGEN SATURATION: 97 % | HEIGHT: 63 IN

## 2025-05-20 DIAGNOSIS — F41.8 SITUATIONAL ANXIETY: ICD-10-CM

## 2025-05-20 DIAGNOSIS — E78.5 HYPERLIPIDEMIA, UNSPECIFIED HYPERLIPIDEMIA TYPE: ICD-10-CM

## 2025-05-20 DIAGNOSIS — I10 BENIGN ESSENTIAL HYPERTENSION: Primary | ICD-10-CM

## 2025-05-20 DIAGNOSIS — J44.9 CHRONIC OBSTRUCTIVE PULMONARY DISEASE, UNSPECIFIED COPD TYPE (HCC): ICD-10-CM

## 2025-05-20 DIAGNOSIS — Z12.31 ENCOUNTER FOR SCREENING MAMMOGRAM FOR MALIGNANT NEOPLASM OF BREAST: ICD-10-CM

## 2025-05-20 PROCEDURE — 99214 OFFICE O/P EST MOD 30 MIN: CPT | Performed by: PHYSICIAN ASSISTANT

## 2025-05-20 PROCEDURE — G2211 COMPLEX E/M VISIT ADD ON: HCPCS | Performed by: PHYSICIAN ASSISTANT

## 2025-05-20 RX ORDER — ALPRAZOLAM 0.25 MG
0.25 TABLET ORAL 2 TIMES DAILY PRN
Qty: 30 TABLET | Refills: 0 | Status: SHIPPED | OUTPATIENT
Start: 2025-05-20

## 2025-05-20 NOTE — ASSESSMENT & PLAN NOTE
Blood pressure at goal continue Maxide 25 mg a day  Orders:    CBC and differential    Comprehensive metabolic panel

## 2025-05-20 NOTE — PROGRESS NOTES
Name: Silvia Tilley      : 1945      MRN: [id number]  Encounter Provider: Hina Howard PA-C  Encounter Date: 2025   Encounter department: [facility]  :  Assessment & Plan  Benign essential hypertension  Blood pressure at goal continue Maxide 25 mg a day  Orders:    CBC and differential    Comprehensive metabolic panel    Hyperlipidemia, unspecified hyperlipidemia type  Lipids at goal continue lovastatin 40 mg and low-fat diet  Orders:    Comprehensive metabolic panel    Lipid panel    Chronic obstructive pulmonary disease, unspecified COPD type (HCC)  Breathing is stable continue ProAir as needed and Spiriva daily.       Situational anxiety  As needed alprazolam 0.25 mg 1/2 to 1 tablet  Orders:    ALPRAZolam (XANAX) 0.25 mg tablet; Take 1 tablet (0.25 mg total) by mouth 2 (two) times a day as needed for anxiety    Encounter for screening mammogram for malignant neoplasm of breast    Orders:    Mammo screening bilateral w 3d and cad; Future           History of Present Illness   Patient presents in the office for follow-up chronic conditions.  Patient has hypertension.  She is on Maxide 25 mg a day.  Hyperlipidemia she is on lovastatin 40 mg.  Patient also has COPD she is on Spiriva inhaled daily and as needed ProAir.  For osteopenia she is on calcium vitamin D supplements.  Patient is scheduled for a left total knee replacement in early September.  Abs reviewed with patient showed normal CBC and CMP.  Lipid panel is at goal. Granddaughter  is  on  hospice  for  illness.  Pt  very  anxious      Review of Systems   Constitutional:  Negative for activity change and unexpected weight change.   HENT:  Negative for ear pain and sore throat.    Eyes:  Negative for visual disturbance.   Respiratory:  Negative for cough, shortness of breath and wheezing.    Cardiovascular:  Negative for chest pain and leg swelling.   Gastrointestinal:  Negative for abdominal pain, blood in stool, constipation,  "diarrhea, nausea and vomiting.   Genitourinary:  Negative for difficulty urinating.   Musculoskeletal:  Positive for arthralgias. Negative for myalgias.   Skin:  Negative for rash.   Neurological:  Negative for dizziness, syncope, light-headedness and headaches.   Psychiatric/Behavioral:  Negative for self-injury, sleep disturbance and suicidal ideas. The patient is nervous/anxious.        Objective   /78 (BP Location: Right arm, Patient Position: Sitting, Cuff Size: Standard)   Pulse 84   Temp 98 °F (36.7 °C) (Temporal)   Resp 16   Ht 5' 3\" (1.6 m)   Wt 58.5 kg (129 lb)   SpO2 97%   BMI 22.85 kg/m²      Physical Exam  Vitals and nursing note reviewed.   Constitutional:       General: She is not in acute distress.     Appearance: Normal appearance. She is well-developed. She is not ill-appearing or diaphoretic.   HENT:      Head: Normocephalic and atraumatic.      Right Ear: Tympanic membrane, ear canal and external ear normal.      Left Ear: Tympanic membrane, ear canal and external ear normal.      Nose: No rhinorrhea.     Eyes:      Conjunctiva/sclera: Conjunctivae normal.      Pupils: Pupils are equal, round, and reactive to light.     Neck:      Thyroid: No thyromegaly.      Vascular: No carotid bruit.     Cardiovascular:      Rate and Rhythm: Normal rate and regular rhythm.      Heart sounds: Normal heart sounds. No murmur heard.     No friction rub.   Pulmonary:      Effort: Pulmonary effort is normal. No respiratory distress.      Breath sounds: Normal breath sounds. No wheezing.   Abdominal:      General: Bowel sounds are normal. There is no distension.      Palpations: Abdomen is soft. There is no mass.      Tenderness: There is no abdominal tenderness.     Musculoskeletal:      Right lower leg: No edema.      Left lower leg: No edema.   Lymphadenopathy:      Cervical: No cervical adenopathy.     Skin:     General: Skin is warm and dry.     Neurological:      General: No focal deficit present. "      Mental Status: She is alert and oriented to person, place, and time.     Psychiatric:         Mood and Affect: Mood is anxious.         Behavior: Behavior normal.         Thought Content: Thought content normal.         Judgment: Judgment normal.

## 2025-05-20 NOTE — ASSESSMENT & PLAN NOTE
Lipids at goal continue lovastatin 40 mg and low-fat diet  Orders:    Comprehensive metabolic panel    Lipid panel

## 2025-05-29 DIAGNOSIS — J44.9 CHRONIC OBSTRUCTIVE PULMONARY DISEASE, UNSPECIFIED COPD TYPE (HCC): ICD-10-CM

## 2025-05-29 RX ORDER — TIOTROPIUM BROMIDE 18 UG/1
18 CAPSULE ORAL; RESPIRATORY (INHALATION) DAILY
Qty: 90 CAPSULE | Refills: 1 | Status: SHIPPED | OUTPATIENT
Start: 2025-05-29

## 2025-06-09 DIAGNOSIS — I10 ESSENTIAL HYPERTENSION: ICD-10-CM

## 2025-06-09 RX ORDER — TRIAMTERENE AND HYDROCHLOROTHIAZIDE 37.5; 25 MG/1; MG/1
1 TABLET ORAL DAILY
Qty: 90 TABLET | Refills: 1 | Status: SHIPPED | OUTPATIENT
Start: 2025-06-09

## 2025-06-26 ENCOUNTER — OFFICE VISIT (OUTPATIENT)
Dept: FAMILY MEDICINE CLINIC | Facility: CLINIC | Age: 80
End: 2025-06-26
Payer: COMMERCIAL

## 2025-06-26 VITALS
WEIGHT: 127.4 LBS | SYSTOLIC BLOOD PRESSURE: 130 MMHG | TEMPERATURE: 97.8 F | HEART RATE: 76 BPM | DIASTOLIC BLOOD PRESSURE: 82 MMHG | OXYGEN SATURATION: 98 % | HEIGHT: 63 IN | BODY MASS INDEX: 22.57 KG/M2

## 2025-06-26 DIAGNOSIS — H10.9 CONJUNCTIVITIS OF LEFT EYE, UNSPECIFIED CONJUNCTIVITIS TYPE: Primary | ICD-10-CM

## 2025-06-26 PROCEDURE — 99213 OFFICE O/P EST LOW 20 MIN: CPT | Performed by: FAMILY MEDICINE

## 2025-06-26 PROCEDURE — G2211 COMPLEX E/M VISIT ADD ON: HCPCS | Performed by: FAMILY MEDICINE

## 2025-06-26 RX ORDER — ERYTHROMYCIN 5 MG/G
0.5 OINTMENT OPHTHALMIC
Qty: 3.5 G | Refills: 0 | Status: SHIPPED | OUTPATIENT
Start: 2025-06-26

## 2025-06-26 NOTE — PROGRESS NOTES
Name: Silvia Tilley      : 1945      MRN: [id number]  Encounter Provider: Juan Hernandez MD  Encounter Date: 2025   Encounter department: [facility]    Assessment & Plan  Conjunctivitis of left eye, unspecified conjunctivitis type  Encourage patient to wash the affected eye out with artificial tear followed by using erythromycin once daily for the next week  If there is worsening swelling redness or pain especially with changing of visual acuity please report to the ER for evaluation    Orders:    erythromycin (ILOTYCIN) ophthalmic ointment; Administer 0.5 inches into the left eye daily at bedtime         History of Present Illness       Conjunctivitis   Associated symptoms include eye itching, rhinorrhea, eye discharge, eye pain and eye redness. Pertinent negatives include no photophobia, no abdominal pain, no congestion, no headaches and no sore throat.       Silvia is a 80-year-old female patient presents for concern regarding grittiness and redness involving her left eye.  Patient woke up this morning with this symptom.   Patient denies any exposures, was home yesterday all day.  Patient denies any recent sick contacts.  She has been experiencing some rhinorrhea as well.  Patient denies any significant change in vision.  Patient does not use contacts. No pain with movement of the eye.     Review of Systems   Constitutional:  Negative for chills.   HENT:  Positive for rhinorrhea. Negative for congestion, postnasal drip and sore throat.    Eyes:  Positive for pain, discharge, redness and itching. Negative for photophobia.   Respiratory:  Negative for shortness of breath.    Cardiovascular:  Negative for chest pain.   Gastrointestinal:  Negative for abdominal pain.   Neurological:  Negative for dizziness, light-headedness and headaches.   Psychiatric/Behavioral:  Negative for sleep disturbance.          Past Medical History[1]  Past Surgical History[2]  Family History[3]  Social  "History[4]  Medications[5]  No Known Allergies  Immunization History   Administered Date(s) Administered    COVID-19 PFIZER VACCINE 0.3 ML IM 02/15/2021, 03/06/2021, 11/19/2021    COVID-19 Pfizer Vac BIVALENT Thomas-sucrose 12 Yr+ IM 11/07/2022    COVID-19 Pfizer mRNA vacc PF thomas-sucrose 12 yr and older (Comirnaty) 11/14/2023, 12/09/2024    INFLUENZA 11/11/2014, 11/11/2015, 11/18/2016, 11/15/2017    Influenza Quadrivalent Preservative Free 3 years and older IM 11/11/2015, 11/18/2016    Influenza Split High Dose Preservative Free IM 10/23/2013, 11/11/2014, 11/15/2017, 11/11/2024    Influenza, high dose seasonal 0.7 mL 10/10/2018, 11/12/2019, 10/29/2020, 10/29/2021, 10/31/2022, 11/01/2023    Influenza, seasonal, injectable, preservative free 12/20/2012    Pneumococcal Conjugate 13-Valent 11/11/2014    Pneumococcal Polysaccharide PPV23 10/29/2020    Respiratory Syncytial Virus Vaccine (Recombinant, Adjuvanted) 12/06/2023    Zoster 06/02/2015     Objective   /82 (BP Location: Right arm, Patient Position: Sitting, Cuff Size: Standard)   Pulse 76   Temp 97.8 °F (36.6 °C)   Ht 5' 3\" (1.6 m)   Wt 57.8 kg (127 lb 6.4 oz)   SpO2 98%   Breastfeeding No   BMI 22.57 kg/m²     Physical Exam  Vitals reviewed.   Constitutional:       General: She is not in acute distress.     Appearance: Normal appearance. She is not ill-appearing, toxic-appearing or diaphoretic.     Eyes:      Pupils: Pupils are equal, round, and reactive to light.      Comments: Inflamed conjunctive noted on the left eye, sclera is also erythematous  Patient denies any pain with extraocular movement,     Cardiovascular:      Rate and Rhythm: Normal rate.      Pulses: Normal pulses.   Pulmonary:      Effort: Pulmonary effort is normal.     Musculoskeletal:         General: No swelling or deformity.     Skin:     General: Skin is warm and dry.      Capillary Refill: Capillary refill takes less than 2 seconds.      Coloration: Skin is not jaundiced. " "    Neurological:      General: No focal deficit present.      Mental Status: She is alert and oriented to person, place, and time.     Psychiatric:         Mood and Affect: Mood normal.         Juan Hernandez M.D.  Family Medicine    Please excuse any \"sound-alike\" errors that may have ocurred during the process of dictation. Parts of this note have been dictated and there may be errors present in the transcription process. Thank you.         [1]   Past Medical History:  Diagnosis Date    Hyperlipemia     Hypertension     Multiple benign polyps of large intestine    [2]   Past Surgical History:  Procedure Laterality Date    ARTHROSCOPY KNEE Left     INCISIONAL BREAST BIOPSY      SKIN LESION EXCISION      lips benign    TUBAL LIGATION  2/73    VARICOSE VEIN SURGERY      ligation   [3]   Family History  Problem Relation Name Age of Onset    Hypertension Mother Charity Draper     Pancreatic cancer Mother Charity Draper     Other Father          MVA with significant injury    Breast cancer Sister Delia Mclaughlin     Lung cancer Brother     [4]   Social History  Tobacco Use    Smoking status: Former     Current packs/day: 0.00     Average packs/day: 1 pack/day for 49.0 years (49.0 ttl pk-yrs)     Types: Cigarettes     Start date: 1961     Quit date: 2010     Years since quitting: 15.4     Passive exposure: Past    Smokeless tobacco: Never   Vaping Use    Vaping status: Never Used   Substance and Sexual Activity    Alcohol use: Not Currently    Drug use: No    Sexual activity: Not Currently   [5]   Current Outpatient Medications on File Prior to Visit   Medication Sig    acetaminophen (TYLENOL) 500 mg tablet Take 500 mg by mouth every 6 (six) hours as needed for mild pain    albuterol (ProAir HFA) 90 mcg/act inhaler Inhale 2 puffs every 4 (four) hours as needed for shortness of breath    ALPRAZolam (XANAX) 0.25 mg tablet Take 1 tablet (0.25 mg total) by mouth 2 (two) times a day as needed for anxiety    ascorbic acid (VITAMIN " C) 500 MG tablet Take 1 tablet (500 mg total) by mouth 2 (two) times a day    Calcium Carb-Cholecalciferol (CALCIUM 1000 + D PO) Take by mouth    coenzyme Q-10 100 MG capsule Take by mouth    ferrous sulfate 324 (65 Fe) mg Take 1 tablet (324 mg total) by mouth daily before breakfast    folic acid (FOLVITE) 1 mg tablet Take 1 tablet (1 mg total) by mouth daily    lovastatin (MEVACOR) 40 MG tablet TAKE 1 TABLET DAILY    Multiple Vitamins-Minerals (MULTI FOR HER) TABS Take by mouth    mupirocin (BACTROBAN) 2 % ointment Apply to bilateral nares twice daily 5 days prior to total joint arthroplasty    Omega-3 Fatty Acids (FISH OIL) 645 MG CAPS Take by mouth    tiotropium (SPIRIVA) 18 mcg inhalation capsule INHALE THE CONTENTS OF 1 CAPSULE DAILY    triamterene-hydrochlorothiazide (MAXZIDE-25) 37.5-25 mg per tablet TAKE 1 TABLET DAILY    HYDROcodone-acetaminophen (NORCO) 5-325 mg per tablet Take 1 tablet by mouth every 6 (six) hours as needed for pain for up to 15 doses Max Daily Amount: 4 tablets (Patient not taking: Reported on 5/14/2025)    meclizine (ANTIVERT) 25 mg tablet Take 1 tablet (25 mg total) by mouth 3 (three) times a day as needed for dizziness (Patient not taking: Reported on 5/1/2024)    triamcinolone (KENALOG) 0.1 % oral topical paste Apply to the mouth or throat As needed  (Patient not taking: Reported on 6/1/2022)

## 2025-07-02 ENCOUNTER — TELEPHONE (OUTPATIENT)
Age: 80
End: 2025-07-02

## 2025-07-02 NOTE — TELEPHONE ENCOUNTER
Name:  Silvia     Date: 07/02/2025     Time: 9:22 am     Phone number : [phone]     The patient would like a call back from Hina regard a knee surgery that she is going to have on 09/03 and needs to go over her medication list to verify which one she needs to stop prior her surgery     Please Contact

## 2025-07-07 ENCOUNTER — TELEPHONE (OUTPATIENT)
Age: 80
End: 2025-07-07

## 2025-07-07 NOTE — TELEPHONE ENCOUNTER
Patient called in stating she is having knee replacement surgery on September 3rd. Patient asked how long prior to surgery she needs to hold fish oil medication and asked how long she is able to take her prescription medications for. Patient asked if Hina could give her a call back to discuss.    Please advise, thank you

## 2025-07-08 NOTE — TELEPHONE ENCOUNTER
Spoke  with patient.  Meds  reviewed  for up  coming  knee  replacement  surgery.  Can  stop  fish oil  5  days  before  surgey.  No  ASA  or  Nsaids for 5  days  prior  to  surgery

## 2025-07-14 ENCOUNTER — TELEPHONE (OUTPATIENT)
Age: 80
End: 2025-07-14

## 2025-07-14 NOTE — TELEPHONE ENCOUNTER
Patient wanted to see Hina Howard for a skin tear/skin tag.  Patient would like to be seen tomorrow. Can we see if we can book?    I did offer apts with other providers but declined    Please call patient back.  Thank you

## 2025-07-15 ENCOUNTER — OFFICE VISIT (OUTPATIENT)
Dept: FAMILY MEDICINE CLINIC | Facility: CLINIC | Age: 80
End: 2025-07-15
Payer: COMMERCIAL

## 2025-07-15 VITALS
TEMPERATURE: 97.4 F | SYSTOLIC BLOOD PRESSURE: 134 MMHG | HEIGHT: 63 IN | WEIGHT: 128.6 LBS | HEART RATE: 62 BPM | OXYGEN SATURATION: 98 % | DIASTOLIC BLOOD PRESSURE: 86 MMHG | BODY MASS INDEX: 22.79 KG/M2

## 2025-07-15 DIAGNOSIS — T14.8XXA SKIN ABRASION: Primary | ICD-10-CM

## 2025-07-15 PROCEDURE — G2211 COMPLEX E/M VISIT ADD ON: HCPCS | Performed by: FAMILY MEDICINE

## 2025-07-15 PROCEDURE — 99213 OFFICE O/P EST LOW 20 MIN: CPT | Performed by: FAMILY MEDICINE

## 2025-07-15 RX ORDER — SILVER SULFADIAZINE 10 MG/G
CREAM TOPICAL DAILY
Qty: 85 G | Refills: 0 | Status: SHIPPED | OUTPATIENT
Start: 2025-07-15

## 2025-07-21 NOTE — TELEPHONE ENCOUNTER
Pt calling in requesting an appointment with Hina today due to the tear she has on her arm. She was seen for it on 7/15/25, but states it is still red and states there is a slight ooze coming from it. She requested to see Hina for this appointment. She has an upcoming surgery in September, and wanted to make sure everything is ok.   I tried to reach the  to see if Hina was able to see pt today, however they were assisting other pts at the time of the call.   Pt is able to come in anytime today.   Please advise, thank you

## 2025-07-22 ENCOUNTER — OFFICE VISIT (OUTPATIENT)
Dept: FAMILY MEDICINE CLINIC | Facility: CLINIC | Age: 80
End: 2025-07-22
Payer: COMMERCIAL

## 2025-07-22 VITALS
OXYGEN SATURATION: 96 % | DIASTOLIC BLOOD PRESSURE: 88 MMHG | TEMPERATURE: 98.1 F | BODY MASS INDEX: 22.5 KG/M2 | HEIGHT: 63 IN | WEIGHT: 127 LBS | SYSTOLIC BLOOD PRESSURE: 144 MMHG | HEART RATE: 89 BPM

## 2025-07-22 DIAGNOSIS — S41.112A LACERATION OF LEFT UPPER ARM, INITIAL ENCOUNTER: Primary | ICD-10-CM

## 2025-07-22 DIAGNOSIS — Z23 ENCOUNTER FOR IMMUNIZATION: ICD-10-CM

## 2025-07-22 PROCEDURE — 99213 OFFICE O/P EST LOW 20 MIN: CPT | Performed by: PHYSICIAN ASSISTANT

## 2025-07-22 PROCEDURE — 90715 TDAP VACCINE 7 YRS/> IM: CPT

## 2025-07-22 PROCEDURE — G2211 COMPLEX E/M VISIT ADD ON: HCPCS | Performed by: PHYSICIAN ASSISTANT

## 2025-07-22 PROCEDURE — 90471 IMMUNIZATION ADMIN: CPT

## 2025-07-22 NOTE — PROGRESS NOTES
"Name: Silvia Tilley      : 1945      MRN: [id number]  Encounter Provider: Hina Howard PA-C  Encounter Date: 2025   Encounter department: [facility]  :  Assessment & Plan  Laceration of left upper arm, initial encounter  Shot updated today       Encounter for immunization                History of Present Illness   Patient presents to the office to have her laceration of her right forearm rechecked.  She was here on July 15.  She stained an avulsion type laceration.  Has been keeping the area clean and dry.  Area is healing well.  There is no infection.  Needs a tetanus booster.  Has hypertension.  Her blood pressure is elevated today she will continue her current regimen.  Her check her blood pressure at home.  Patient is very very anxious about her upcoming knee replacement surgery.      Review of Systems   Constitutional:  Negative for fever.   Respiratory:  Negative for cough and shortness of breath.    Cardiovascular:  Negative for chest pain and leg swelling.   Skin:  Positive for wound.       Objective   /88   Pulse 89   Temp 98.1 °F (36.7 °C) (Temporal)   Ht 5' 3\" (1.6 m)   Wt 57.6 kg (127 lb)   SpO2 96%   Breastfeeding No   BMI 22.50 kg/m²      Physical Exam  Vitals and nursing note reviewed.   Constitutional:       General: She is not in acute distress.     Appearance: She is not ill-appearing.   HENT:      Head: Normocephalic and atraumatic.      Right Ear: External ear normal.      Left Ear: External ear normal.     Eyes:      Conjunctiva/sclera: Conjunctivae normal.       Musculoskeletal:      Right lower leg: No edema.      Left lower leg: No edema.     Skin:     General: Skin is warm and dry.      Comments: Patient has a healing avulsion abrasion laceration approximately 1 inch on the left forearm     Neurological:      General: No focal deficit present.      Mental Status: She is oriented to person, place, and time.         "

## 2025-07-28 ENCOUNTER — TELEPHONE (OUTPATIENT)
Dept: OBGYN CLINIC | Facility: HOSPITAL | Age: 80
End: 2025-07-28

## 2025-08-04 PROBLEM — R92.0 MAMMOGRAPHIC MICROCALCIFICATION: Status: ACTIVE | Noted: 2020-09-08

## 2025-08-04 PROBLEM — S86.112A STRAIN OF GASTROCNEMIUS MUSCLE OF LEFT LOWER EXTREMITY: Status: RESOLVED | Noted: 2024-11-06 | Resolved: 2025-08-04

## 2025-08-04 PROBLEM — K63.5 POLYP OF SIGMOID COLON: Status: RESOLVED | Noted: 2018-04-16 | Resolved: 2025-08-04

## 2025-08-06 ENCOUNTER — APPOINTMENT (OUTPATIENT)
Dept: LAB | Facility: HOSPITAL | Age: 80
End: 2025-08-06
Attending: ORTHOPAEDIC SURGERY
Payer: COMMERCIAL

## 2025-08-06 ENCOUNTER — LAB REQUISITION (OUTPATIENT)
Dept: LAB | Facility: HOSPITAL | Age: 80
End: 2025-08-06
Payer: COMMERCIAL

## 2025-08-06 DIAGNOSIS — M17.12 PRIMARY OSTEOARTHRITIS OF LEFT KNEE: ICD-10-CM

## 2025-08-06 PROBLEM — E87.6 HYPOKALEMIA: Status: ACTIVE | Noted: 2025-08-06

## 2025-08-06 PROCEDURE — 93005 ELECTROCARDIOGRAM TRACING: CPT

## 2025-08-08 ENCOUNTER — TELEMEDICINE (OUTPATIENT)
Dept: ANESTHESIOLOGY | Facility: CLINIC | Age: 80
End: 2025-08-08
Payer: COMMERCIAL

## 2025-08-08 VITALS — HEART RATE: 61 BPM | DIASTOLIC BLOOD PRESSURE: 81 MMHG | SYSTOLIC BLOOD PRESSURE: 127 MMHG

## 2025-08-08 DIAGNOSIS — Z01.89 ENCOUNTER FOR GERIATRIC ASSESSMENT: ICD-10-CM

## 2025-08-08 DIAGNOSIS — I10 BENIGN ESSENTIAL HYPERTENSION: ICD-10-CM

## 2025-08-08 DIAGNOSIS — M17.12 PRIMARY OSTEOARTHRITIS OF LEFT KNEE: Primary | ICD-10-CM

## 2025-08-08 LAB
ATRIAL RATE: 60 BPM
P AXIS: 61 DEGREES
PR INTERVAL: 186 MS
QRS AXIS: 28 DEGREES
QRSD INTERVAL: 100 MS
QT INTERVAL: 418 MS
QTC INTERVAL: 418 MS
T WAVE AXIS: 62 DEGREES
VENTRICULAR RATE: 60 BPM

## 2025-08-08 PROCEDURE — 93010 ELECTROCARDIOGRAM REPORT: CPT | Performed by: INTERNAL MEDICINE

## 2025-08-08 PROCEDURE — 99203 OFFICE O/P NEW LOW 30 MIN: CPT | Performed by: NURSE PRACTITIONER

## 2025-08-12 ENCOUNTER — OFFICE VISIT (OUTPATIENT)
Age: 80
End: 2025-08-12
Payer: COMMERCIAL